# Patient Record
Sex: FEMALE | Race: WHITE | NOT HISPANIC OR LATINO | Employment: FULL TIME | URBAN - METROPOLITAN AREA
[De-identification: names, ages, dates, MRNs, and addresses within clinical notes are randomized per-mention and may not be internally consistent; named-entity substitution may affect disease eponyms.]

---

## 2017-05-24 ENCOUNTER — HOSPITAL ENCOUNTER (OUTPATIENT)
Dept: RADIOLOGY | Facility: MEDICAL CENTER | Age: 52
End: 2017-05-24
Attending: SPECIALIST
Payer: COMMERCIAL

## 2017-05-24 DIAGNOSIS — R74.8 OTHER NONSPECIFIC ABNORMAL SERUM ENZYME LEVELS: ICD-10-CM

## 2017-05-24 PROCEDURE — 76700 US EXAM ABDOM COMPLETE: CPT

## 2021-06-11 ENCOUNTER — TELEPHONE (OUTPATIENT)
Dept: SCHEDULING | Facility: IMAGING CENTER | Age: 56
End: 2021-06-11

## 2021-06-14 ENCOUNTER — OFFICE VISIT (OUTPATIENT)
Dept: MEDICAL GROUP | Facility: PHYSICIAN GROUP | Age: 56
End: 2021-06-14
Payer: COMMERCIAL

## 2021-06-14 VITALS
WEIGHT: 246.6 LBS | OXYGEN SATURATION: 99 % | HEIGHT: 65 IN | TEMPERATURE: 99.1 F | HEART RATE: 91 BPM | SYSTOLIC BLOOD PRESSURE: 128 MMHG | DIASTOLIC BLOOD PRESSURE: 80 MMHG | RESPIRATION RATE: 18 BRPM | BODY MASS INDEX: 41.09 KG/M2

## 2021-06-14 DIAGNOSIS — Z13.228 SCREENING FOR METABOLIC DISORDER: ICD-10-CM

## 2021-06-14 DIAGNOSIS — E78.49 OTHER HYPERLIPIDEMIA: ICD-10-CM

## 2021-06-14 DIAGNOSIS — C88.4 MALT LYMPHOMA (HCC): ICD-10-CM

## 2021-06-14 DIAGNOSIS — Z72.9 PROBLEM RELATED TO LIFESTYLE, UNSPECIFIED: ICD-10-CM

## 2021-06-14 DIAGNOSIS — E11.9 TYPE 2 DIABETES MELLITUS WITHOUT COMPLICATION, WITH LONG-TERM CURRENT USE OF INSULIN (HCC): ICD-10-CM

## 2021-06-14 DIAGNOSIS — E03.9 ACQUIRED HYPOTHYROIDISM: ICD-10-CM

## 2021-06-14 DIAGNOSIS — R91.8 PULMONARY NODULES: ICD-10-CM

## 2021-06-14 DIAGNOSIS — C55 ENDOMETRIOID ADENOCARCINOMA OF UTERUS (HCC): ICD-10-CM

## 2021-06-14 DIAGNOSIS — Z11.59 NEED FOR HEPATITIS C SCREENING TEST: ICD-10-CM

## 2021-06-14 DIAGNOSIS — K52.9 IBD (INFLAMMATORY BOWEL DISEASE): ICD-10-CM

## 2021-06-14 DIAGNOSIS — Z79.4 TYPE 2 DIABETES MELLITUS WITHOUT COMPLICATION, WITH LONG-TERM CURRENT USE OF INSULIN (HCC): ICD-10-CM

## 2021-06-14 DIAGNOSIS — I10 ESSENTIAL HYPERTENSION: ICD-10-CM

## 2021-06-14 DIAGNOSIS — K21.9 GASTROESOPHAGEAL REFLUX DISEASE WITHOUT ESOPHAGITIS: ICD-10-CM

## 2021-06-14 PROBLEM — C88.40 MALT LYMPHOMA: Status: ACTIVE | Noted: 2021-06-14

## 2021-06-14 PROCEDURE — 99204 OFFICE O/P NEW MOD 45 MIN: CPT | Performed by: STUDENT IN AN ORGANIZED HEALTH CARE EDUCATION/TRAINING PROGRAM

## 2021-06-14 RX ORDER — INSULIN LISPRO 100 [IU]/ML
INJECTION, SOLUTION INTRAVENOUS; SUBCUTANEOUS
COMMUNITY
End: 2021-06-14 | Stop reason: SDUPTHER

## 2021-06-14 RX ORDER — OMEPRAZOLE 20 MG/1
20 CAPSULE, DELAYED RELEASE ORAL DAILY
COMMUNITY
End: 2021-06-14

## 2021-06-14 RX ORDER — DULAGLUTIDE 1.5 MG/.5ML
INJECTION, SOLUTION SUBCUTANEOUS
COMMUNITY
End: 2021-06-14 | Stop reason: SDUPTHER

## 2021-06-14 RX ORDER — LEVOTHYROXINE SODIUM 0.1 MG/1
100 TABLET ORAL
COMMUNITY
End: 2022-02-11 | Stop reason: SDUPTHER

## 2021-06-14 RX ORDER — INSULIN LISPRO 100 [IU]/ML
10 INJECTION, SOLUTION INTRAVENOUS; SUBCUTANEOUS
Qty: 27 ML | Refills: 1 | Status: SHIPPED | OUTPATIENT
Start: 2021-06-14 | End: 2021-09-08

## 2021-06-14 RX ORDER — RAMIPRIL 2.5 MG/1
2.5 CAPSULE ORAL DAILY
COMMUNITY
End: 2021-06-14 | Stop reason: SDUPTHER

## 2021-06-14 RX ORDER — DULAGLUTIDE 1.5 MG/.5ML
0.5 INJECTION, SOLUTION SUBCUTANEOUS
Qty: 45 ML | Refills: 1 | Status: SHIPPED | OUTPATIENT
Start: 2021-06-14 | End: 2022-08-11 | Stop reason: SDUPTHER

## 2021-06-14 RX ORDER — SIMVASTATIN 10 MG
10 TABLET ORAL NIGHTLY
COMMUNITY
End: 2021-06-14 | Stop reason: SDUPTHER

## 2021-06-14 RX ORDER — SIMVASTATIN 10 MG
10 TABLET ORAL EVERY EVENING
Qty: 90 TABLET | Refills: 1 | Status: SHIPPED | OUTPATIENT
Start: 2021-06-14 | End: 2021-11-03

## 2021-06-14 RX ORDER — INSULIN DEGLUDEC 100 U/ML
INJECTION, SOLUTION SUBCUTANEOUS
COMMUNITY
End: 2021-06-14 | Stop reason: SDUPTHER

## 2021-06-14 RX ORDER — PANTOPRAZOLE SODIUM 20 MG/1
20 TABLET, DELAYED RELEASE ORAL DAILY
Qty: 90 TABLET | Refills: 1 | Status: SHIPPED | OUTPATIENT
Start: 2021-06-14 | End: 2021-11-19

## 2021-06-14 RX ORDER — RAMIPRIL 2.5 MG/1
2.5 CAPSULE ORAL DAILY
Qty: 90 CAPSULE | Refills: 1 | Status: SHIPPED | OUTPATIENT
Start: 2021-06-14 | End: 2021-11-19

## 2021-06-14 RX ORDER — INSULIN DEGLUDEC 100 U/ML
20 INJECTION, SOLUTION SUBCUTANEOUS EVERY MORNING
Qty: 1800 ML | Refills: 1 | Status: SHIPPED | OUTPATIENT
Start: 2021-06-14 | End: 2022-07-25 | Stop reason: SDUPTHER

## 2021-06-14 ASSESSMENT — PATIENT HEALTH QUESTIONNAIRE - PHQ9: CLINICAL INTERPRETATION OF PHQ2 SCORE: 0

## 2021-06-14 NOTE — ASSESSMENT & PLAN NOTE
Chronic and ongoing.  In the setting of type 2 diabetes.  Presently on simvastatin 10.  No muscle cramps, or confusions.    - will get new labs.

## 2021-06-14 NOTE — ASSESSMENT & PLAN NOTE
Chronic and ongoing.   Uncertain of recent A1c values.  (has been out of a couple meds - insulins - recently).   Only had Trulicity, (only 0.5 ml of 1.5 dose)....   out of Tresiba (out for 2 weeks) and humalog (out for 6 weeks)  Has also had metformin 1000 bid  Denies:  polyphagia, polydipsia, polyuria.    - will refill prior DM-medications. (as above).   - will get new labs.   - will need further follow-up, as most labs are likely to      be out of range, given her being out of meds.

## 2021-06-14 NOTE — LETTER
[facility]  Luis Eduardo Hamm M.D.  [address] [place] [address]  Fax: [phone]   Authorization for Release/Disclosure of   Protected Health Information   Name: RAJ KATE : 1965 SSN: [id number]   Address: [facility] [place] [address] Phone:    [phone] (home)    I authorize the entity listed below to release/disclose the PHI below to:   [facility]/Luis Eduardo Hamm M.D. and Luis Eduardo Hamm M.D.   Provider or Entity Name:   Dr. Phil Lackey  ([facility])          (oncology)    Address   [facility], [place] [address] Phone:   [phone]    Fax:   [phone]   Reason for request: continuity of care   Information to be released:    [  ] LAST COLONOSCOPY,  including any PATH REPORT and follow-up  [  ] LAST FIT/COLOGUARD RESULT [  ] LAST DEXA  [  ] LAST MAMMOGRAM  [  ] LAST PAP  [X] LAST LABS [  ] RETINA EXAM REPORT  [X] IMMUNIZATION RECORDS  [X] Release LAST 6 NOTES      [  ] Check here and initial the line next to each item to release ALL health information INCLUDING  _____ Care and treatment for drug and / or alcohol abuse  _____ HIV testing, infection status, or AIDS  _____ Genetic Testing    DATES OF SERVICE OR TIME PERIOD TO BE DISCLOSED: _____________  I understand and acknowledge that:  * This Authorization may be revoked at any time by you in writing, except if your health information has already been used or disclosed.  * Your health information that will be used or disclosed as a result of you signing this authorization could be re-disclosed by the recipient. If this occurs, your re-disclosed health information may no longer be protected by State or Federal laws.  * You may refuse to sign this Authorization. Your refusal will not affect your ability to obtain treatment.  * This Authorization becomes effective upon signing and will  on (date) __________.      If no date is  indicated, this Authorization will  one (1) year from the signature date.    Name: Stefania Serrano    Signature:   Date:     2021       PLEASE FAX REQUESTED RECORDS BACK TO: [phone]

## 2021-06-14 NOTE — ASSESSMENT & PLAN NOTE
Patient notes previously being told about pulmonary nodules.  This is part of what led up to her lymphoma diagnosis and other cancer findings.  She states that have been stable so far.   Denies dyspnea or fevers.   - We will request outside records.  - Being referred to oncology, for other cancers as well.

## 2021-06-14 NOTE — ASSESSMENT & PLAN NOTE
Hx of Chron's Disease, for about 20 years.   Notes, the imaging done for this, was why they   found multiple other issues.(Cancers).  Currently somewhat well controlled.  Will need referral to GI in the future.  - will get prior records.

## 2021-06-14 NOTE — ASSESSMENT & PLAN NOTE
Chronic and ongoing.   No surgeries or nodules, just described as low functioning.  Has been on levothyroxine 100.  Continues use this dose.  Notes her prior PCP was consider changing the dose.  - Get basic labs, including TSH.

## 2021-06-14 NOTE — PROGRESS NOTES
New to Provider  (and [facility])      Stefania Serrano is a 56 y.o. female.    Reason to establish: New patient to establish      Chief Complaint   Patient presents with   • Establish Care     -  New to [place].  (has been moving a lot for work).             Problems discussed this visit:    This note uses problem-based documentation.  Subjective HPI is included in Assessment & Plan, at bottom of note.   Problem   Ibd (Inflammatory Bowel Disease)   Malt Lymphoma (Hcc)   Endometrioid Adenocarcinoma of Uterus (Hcc)   Type 2 Diabetes Mellitus Without Complication, With Long-Term Current Use of Insulin (Hcc)   Acquired Hypothyroidism   Gastroesophageal Reflux Disease Without Esophagitis   Essential Hypertension   Other Hyperlipidemia   Pulmonary Nodules                          Past Medical History:   Diagnosis Date   • Acquired hypothyroidism 6/14/2021   • Essential hypertension 6/14/2021   • IBD (inflammatory bowel disease) 6/14/2021    Chron's   • MALT lymphoma (HCC) 6/14/2021   • Pulmonary nodules 6/14/2021   • Type 2 diabetes mellitus without complication, with long-term current use of insulin (HCC) 6/14/2021       Past Surgical History:   Procedure Laterality Date   • ABDOMINAL HYSTERECTOMY TOTAL      NADER + SBO       Family History   Problem Relation Age of Onset   • No Known Problems Other        Social History     Tobacco Use   • Smoking status: Never Smoker   • Smokeless tobacco: Never Used   Substance Use Topics   • Alcohol use: Yes     Comment: 2 times a year       Current medications:  (including new changes):  Current Outpatient Medications   Medication Sig Dispense Refill   • metformin (GLUCOPHAGE) 1000 MG tablet Take 1,000 mg by mouth 2 times a day with meals.     • levothyroxine (SYNTHROID) 100 MCG Tab Take 100 mcg by mouth every morning on an empty stomach.     • Dulaglutide (TRULICITY) 1.5 MG/0.5ML Solution Pen-injector Inject 0.5 mL under the skin every 7 days. 45 mL 1   • Insulin Degludec  "(TRESIBA FLEXTOUCH) 100 UNIT/ML Solution Pen-injector Inject 20 Units under the skin every morning. 1800 mL 1   • insulin lispro (HUMALOG KWIKPEN) 100 UNIT/ML Solution Pen-injector injection PEN Inject 10 Units under the skin 3 times a day before meals. 27 mL 1   • pantoprazole (PROTONIX) 20 MG tablet Take 1 tablet by mouth every day. 90 tablet 1   • ramipril (ALTACE) 2.5 MG Cap Take 1 capsule by mouth every day. 90 capsule 1   • simvastatin (ZOCOR) 10 MG Tab Take 1 tablet by mouth every evening. 90 tablet 1     No current facility-administered medications for this visit.       Allergies as of 06/14/2021   • (No Known Allergies)                    Review of Symptoms  (as noted elsewhere, and .....)    GEN/CONST:   Denies fever, chills, fatigue,    CARDIO:   Denies chest pain, palpitations, or edema.    RESP:   Denies shortness of breath, wheezing, or coughing.  GI:    Denies nausea, vomiting, diarrhea,        + does note occasional loose stooles (w/hx of chron's).   NEURO:  Denies numbness or focal weakness.       PSYCH:  Denies anxiety, depression,        Physical Exam  /80 (BP Location: Left arm, Patient Position: Sitting, BP Cuff Size: Adult)   Pulse 91   Temp 37.3 °C (99.1 °F) (Temporal)   Resp 18   Ht 1.657 m (5' 5.25\")   Wt 112 kg (246 lb 9.6 oz)   SpO2 99%   BMI 40.72 kg/m²   General:  Alert and oriented, No apparent distress.  Neck:  Supple. Trachea midline.   No lymphadenopathy noted.    Lungs: Clear to auscultation bilaterally.  No wheezes or rales.     Cardiovascular: Regular rate and rhythm.  No murmurs, or gallops.  Abdomen:  Large, but Soft.  Non-distended.  Non-tender.     Extremities:  Large, but  No pedal edema.  Good general motion of extremities.    Psychological: Appears to have normal mood and affect.      Labs:  No recent labs to review.                            Assessment & Plan  (with subjective history and orders):  Of note, the list below is not in a specific order.  "     Problem List Items Addressed This Visit         Endometrioid adenocarcinoma of uterus (HCC)     Prior diagnosis at end of 2019.   Had NADER and SBO.    She presents with a few outside records, that note     Pathology / dx codes noted:   pT1 - tumor confined to corpus uteri,              including endocervical glandular infolvement,       & pN0(i+) - isolated tumor cells in regional              lymph node(s) no greater than 0.2 mm.      ... left hypogastric sentinel lymph node with          isolated tumor cells (around 30), in 1 of 1 lymph node.   She notes having had NADER and SBO.   Then had chemo and radiation-tx after surgery.  - will request outside records.   - refer to GYN-ONC. (locally).          Relevant Orders    CBC WITH DIFFERENTIAL        REFERRAL TO GYNECOLOGIC ONCOLOGY        MALT  lymphoma (HCC)     Chronic and ongoing.   Began in July 2019.   Dx end of 2019 - MALT Lymphoma.   Had been seeing Dr. Phil Lackey,      (at [facility], in [facility]).   Had noted prior low lung function test.   Had been started rituximab,     but completed after a couple months.   Currently not on anything for this.   Prior Oncologist had considered future maintenance tx,     but not started yet....  - will get old records.   - will refer to local oncologist.  (Heme/Onc).          Relevant Orders    CBC WITH DIFFERENTIAL    REFERRAL TO HEMATOLOGY ONCOLOGY    Pulmonary nodules     Patient notes previously being told about pulmonary nodules.  This is part of what led up to her lymphoma diagnosis and other cancer findings.  She states that have been stable so far.   Denies dyspnea or fevers.   - We will request outside records.  - Being referred to oncology, for other cancers as well.           Acquired hypothyroidism     Chronic and ongoing.   No surgeries or nodules, just described as low functioning.  Has been on levothyroxine 100.  Continues use this dose.  Notes her prior PCP was consider changing the  dose.  - Get basic labs, including TSH.          Relevant Medications    levothyroxine (SYNTHROID) 100 MCG Tab    Other Relevant Orders    TSH WITH REFLEX TO FT4      Type 2 diabetes mellitus without complication, with long-term current use of insulin (HCC)     Chronic and ongoing.   Uncertain of recent A1c values.  (has been out of a couple meds - insulins - recently).   Only had Trulicity, (only 0.5 ml of 1.5 dose)....   out of Tresiba (out for 2 weeks) and humalog (out for 6 weeks)  Has also had metformin 1000 bid  Denies:  polyphagia, polydipsia, polyuria.    - will refill prior DM-medications. (as above).   - will get new labs.   - will need further follow-up, as most labs are likely to      be out of range, given her being out of meds.           Relevant Medications    metformin (GLUCOPHAGE) 1000 MG tablet    Dulaglutide (TRULICITY) 1.5 MG/0.5ML Solution Pen-injector    Insulin Degludec (TRESIBA FLEXTOUCH) 100 UNIT/ML Solution Pen-injector    insulin lispro (HUMALOG KWIKPEN) 100 UNIT/ML Solution Pen-injector injection PEN    Other Relevant Orders    Comp Metabolic Panel    Lipid Profile    MICROALBUMIN CREAT RATIO URINE    HEMOGLOBIN A1C      Essential hypertension     Chronic and ongoing, in setting of DM-2.   Has been on ramipril, mostly for DM-2.   Today, BP - borderline, diastolic (128/80).   Denies:  angina, palpitations, or dyspnea.     - will refill  Ramipril 2.5          Relevant Medications    ramipril (ALTACE) 2.5 MG Cap    simvastatin (ZOCOR) 10 MG Tab    Other Relevant Orders    Comp Metabolic Panel    MAGNESIUM    Gastroesophageal reflux disease without esophagitis     Chronic and ongoing.  Notes occasional heartburn, but denies past problems with throat.  Continues on PPI: Prilosec,  But states Protonix has worked better for her in the past.  - We will prescribe Protonix,     but uncertain if insurance will cover it. (patient aware).          Relevant Medications    pantoprazole (PROTONIX) 20 MG  tablet      IBD (inflammatory bowel disease)     Hx of Chron's Disease, for about 20 years.   Notes, the imaging done for this, was why they   found multiple other issues.(Cancers).  Currently somewhat well controlled.  Will need referral to GI in the future.  - will get prior records.          Relevant Medications    pantoprazole (PROTONIX) 20 MG tablet    Other Relevant Orders    CBC WITH DIFFERENTIAL    Comp Metabolic Panel    VITAMIN D,25 HYDROXY      Other hyperlipidemia     Chronic and ongoing.  In the setting of type 2 diabetes.  Presently on simvastatin 10.  No muscle cramps, or confusions.    - will get new labs.          Relevant Medications    ramipril (ALTACE) 2.5 MG Cap    simvastatin (ZOCOR) 10 MG Tab    Other Relevant Orders    Lipid Profile     Other Visit Diagnoses     Screening for metabolic disorder        Relevant Orders    CBC WITH DIFFERENTIAL    Comp Metabolic Panel    Lipid Profile    HEMOGLOBIN A1C    TSH WITH REFLEX TO FT4    VITAMIN D,25 HYDROXY    Need for hepatitis C screening test        Relevant Orders    HEP C VIRUS ANTIBODY    Problem related to lifestyle, unspecified        Relevant Orders    HEP C VIRUS ANTIBODY     Of note, the above list is not in a specific order.                  Diagnosis Table, with orders:  1. MALT lymphoma (HCC)  CBC WITH DIFFERENTIAL    REFERRAL TO HEMATOLOGY ONCOLOGY   2. Endometrioid adenocarcinoma of uterus (HCC)  CBC WITH DIFFERENTIAL        REFERRAL TO GYNECOLOGIC ONCOLOGY   3. Pulmonary nodules     4. Type 2 diabetes mellitus without complication, with long-term current use of insulin (HCC)  Dulaglutide (TRULICITY) 1.5 MG/0.5ML Solution Pen-injector    Insulin Degludec (TRESIBA FLEXTOUCH) 100 UNIT/ML Solution Pen-injector    insulin lispro (HUMALOG KWIKPEN) 100 UNIT/ML Solution Pen-injector injection PEN    Comp Metabolic Panel    Lipid Profile    MICROALBUMIN CREAT RATIO URINE    HEMOGLOBIN A1C   5. Essential hypertension  ramipril (ALTACE)  2.5 MG Cap    Comp Metabolic Panel    MAGNESIUM   6. Other hyperlipidemia  simvastatin (ZOCOR) 10 MG Tab    Lipid Profile   7. Acquired hypothyroidism  TSH WITH REFLEX TO FT4   8. IBD (inflammatory bowel disease)  CBC WITH DIFFERENTIAL    Comp Metabolic Panel    VITAMIN D,25 HYDROXY   9. Gastroesophageal reflux disease without esophagitis  pantoprazole (PROTONIX) 20 MG tablet   10. Screening for metabolic disorder  CBC WITH DIFFERENTIAL    Comp Metabolic Panel    Lipid Profile    HEMOGLOBIN A1C    TSH WITH REFLEX TO FT4    VITAMIN D,25 HYDROXY   11. Need for hepatitis C screening test  HEP C VIRUS ANTIBODY   12. Problem related to lifestyle, unspecified  HEP C VIRUS ANTIBODY                 Follow-up:  1-2 weeks  (DM-2, and multiple issues)       (can also review screenings).               A computerized dictation system may have been used in this note.    Despite review, there may be some errors in spelling or grammar.    Luis Eduardo Hamm M.D.  6/14/2021

## 2021-06-14 NOTE — ASSESSMENT & PLAN NOTE
Chronic and ongoing, in setting of DM-2.   Has been on ramipril, mostly for DM-2.   Today, BP - borderline, diastolic (128/80).   Denies:  angina, palpitations, or dyspnea.     - will refill  Ramipril 2.5

## 2021-06-14 NOTE — ASSESSMENT & PLAN NOTE
Chronic and ongoing.   Began in July 2019.   Dx end of 2019 - MALT Lymphoma.   Had been seeing Dr. Phil Lackey,      (at [facility], in [facility]   Had noted prior low lung function test.   Had been started rituximab,     but completed after a couple months.   Currently not on anything for this.   Prior Oncologist had considered future maintenance tx,     but not started yet....  - will get old records.   - will refer to local oncologist.  (Heme/Onc).

## 2021-06-14 NOTE — ASSESSMENT & PLAN NOTE
Chronic and ongoing.  Notes occasional heartburn, but denies past problems with throat.  Continues on PPI: Prilosec,  But states Protonix has worked better for her in the past.  - We will prescribe Protonix,     but uncertain if insurance will cover it. (patient aware).

## 2021-06-14 NOTE — ASSESSMENT & PLAN NOTE
Prior diagnosis at end of 2019.   Had NADER and SBO.    She presents with a few outside records, that note     Pathology / dx codes noted:   pT1 - tumor confined to corpus uteri,              including endocervical glandular infolvement,       & pN0(i+) - isolated tumor cells in regional              lymph node(s) no greater than 0.2 mm.      ... left hypogastric sentinel lymph node with          isolated tumor cells (around 30), in 1 of 1 lymph node.   She notes having had NADER and SBO.   Then had chemo and radiation-tx after surgery.  - will request outside records.   - refer to GYN-ONC. (locally).

## 2021-06-14 NOTE — LETTER
[facility]  Luis Eduardo Hamm M.D.  [address] [place] [address]  Fax: [phone]   Authorization for Release/Disclosure of   Protected Health Information   Name: STEFANIA KATE : 1965 SSN: [id number]   Address: [facility] [place] [address] Phone:    [phone] (home)    I authorize the entity listed below to release/disclose the PHI below to:   [facility]/Luis Eduardo Hamm M.D. and Luis Eduardo Hamm M.D.   Provider or Entity Name:   Dr. Ninfa Adames,  ([facility]).    Address   [facility], [place], [place] Phone:   [phone]    Fax:     Reason for request: continuity of care   Information to be released:    [  ] LAST COLONOSCOPY,  including any PATH REPORT and follow-up  [  ] LAST FIT/COLOGUARD RESULT [  ] LAST DEXA  [  ] LAST MAMMOGRAM  [  ] LAST PAP  [  ] LAST LABS [  ] RETINA EXAM REPORT  [  ] IMMUNIZATION RECORDS  [X] Release all info      [  ] Check here and initial the line next to each item to release ALL health information INCLUDING  _____ Care and treatment for drug and / or alcohol abuse  _____ HIV testing, infection status, or AIDS  _____ Genetic Testing    DATES OF SERVICE OR TIME PERIOD TO BE DISCLOSED: _____________  I understand and acknowledge that:  * This Authorization may be revoked at any time by you in writing, except if your health information has already been used or disclosed.  * Your health information that will be used or disclosed as a result of you signing this authorization could be re-disclosed by the recipient. If this occurs, your re-disclosed health information may no longer be protected by State or Federal laws.  * You may refuse to sign this Authorization. Your refusal will not affect your ability to obtain treatment.  * This Authorization becomes effective upon signing and will  on (date) __________.      If no date is indicated, this Authorization will  one (1) year from the signature date.    Name: Stefania  Maggie    Signature:   Date:     6/14/2021       PLEASE FAX REQUESTED RECORDS BACK TO: [phone]

## 2021-06-14 NOTE — PATIENT INSTRUCTIONS
Please get the labs done in the next few days.  Please get them done while fasting (no food, coffee, or juices, for 8 hours - but water is ok).     Please follow-up with the referrals, when contacted.   Thank you.

## 2021-06-18 ENCOUNTER — HOSPITAL ENCOUNTER (OUTPATIENT)
Dept: LAB | Facility: MEDICAL CENTER | Age: 56
End: 2021-06-18
Attending: STUDENT IN AN ORGANIZED HEALTH CARE EDUCATION/TRAINING PROGRAM
Payer: COMMERCIAL

## 2021-06-18 DIAGNOSIS — E03.9 ACQUIRED HYPOTHYROIDISM: ICD-10-CM

## 2021-06-18 DIAGNOSIS — I10 ESSENTIAL HYPERTENSION: ICD-10-CM

## 2021-06-18 DIAGNOSIS — C88.4 MALT LYMPHOMA (HCC): ICD-10-CM

## 2021-06-18 DIAGNOSIS — C55 ENDOMETRIOID ADENOCARCINOMA OF UTERUS (HCC): ICD-10-CM

## 2021-06-18 DIAGNOSIS — Z11.59 NEED FOR HEPATITIS C SCREENING TEST: ICD-10-CM

## 2021-06-18 DIAGNOSIS — E11.9 TYPE 2 DIABETES MELLITUS WITHOUT COMPLICATION, WITH LONG-TERM CURRENT USE OF INSULIN (HCC): ICD-10-CM

## 2021-06-18 DIAGNOSIS — E78.49 OTHER HYPERLIPIDEMIA: ICD-10-CM

## 2021-06-18 DIAGNOSIS — Z72.9 PROBLEM RELATED TO LIFESTYLE, UNSPECIFIED: ICD-10-CM

## 2021-06-18 DIAGNOSIS — K52.9 IBD (INFLAMMATORY BOWEL DISEASE): ICD-10-CM

## 2021-06-18 DIAGNOSIS — Z13.228 SCREENING FOR METABOLIC DISORDER: ICD-10-CM

## 2021-06-18 DIAGNOSIS — Z79.4 TYPE 2 DIABETES MELLITUS WITHOUT COMPLICATION, WITH LONG-TERM CURRENT USE OF INSULIN (HCC): ICD-10-CM

## 2021-06-18 PROCEDURE — 85025 COMPLETE CBC W/AUTO DIFF WBC: CPT

## 2021-06-18 PROCEDURE — 86304 IMMUNOASSAY TUMOR CA 125: CPT

## 2021-06-18 PROCEDURE — 83036 HEMOGLOBIN GLYCOSYLATED A1C: CPT

## 2021-06-18 PROCEDURE — 82306 VITAMIN D 25 HYDROXY: CPT

## 2021-06-18 PROCEDURE — 80053 COMPREHEN METABOLIC PANEL: CPT

## 2021-06-18 PROCEDURE — 86803 HEPATITIS C AB TEST: CPT

## 2021-06-18 PROCEDURE — 84443 ASSAY THYROID STIM HORMONE: CPT

## 2021-06-18 PROCEDURE — 82570 ASSAY OF URINE CREATININE: CPT

## 2021-06-18 PROCEDURE — 80061 LIPID PANEL: CPT

## 2021-06-18 PROCEDURE — 83735 ASSAY OF MAGNESIUM: CPT

## 2021-06-18 PROCEDURE — 36415 COLL VENOUS BLD VENIPUNCTURE: CPT

## 2021-06-18 PROCEDURE — 82043 UR ALBUMIN QUANTITATIVE: CPT

## 2021-06-19 LAB
25(OH)D3 SERPL-MCNC: 40 NG/ML (ref 30–100)
ALBUMIN SERPL BCP-MCNC: 3.9 G/DL (ref 3.2–4.9)
ALBUMIN/GLOB SERPL: 0.9 G/DL
ALP SERPL-CCNC: 99 U/L (ref 30–99)
ALT SERPL-CCNC: 54 U/L (ref 2–50)
ANION GAP SERPL CALC-SCNC: 11 MMOL/L (ref 7–16)
AST SERPL-CCNC: 41 U/L (ref 12–45)
BASOPHILS # BLD AUTO: 1.1 % (ref 0–1.8)
BASOPHILS # BLD: 0.07 K/UL (ref 0–0.12)
BILIRUB SERPL-MCNC: 0.4 MG/DL (ref 0.1–1.5)
BUN SERPL-MCNC: 17 MG/DL (ref 8–22)
CALCIUM SERPL-MCNC: 9.1 MG/DL (ref 8.5–10.5)
CANCER AG125 SERPL-ACNC: 17.7 U/ML (ref 0–35)
CHLORIDE SERPL-SCNC: 101 MMOL/L (ref 96–112)
CHOLEST SERPL-MCNC: 187 MG/DL (ref 100–199)
CO2 SERPL-SCNC: 27 MMOL/L (ref 20–33)
CREAT SERPL-MCNC: 0.67 MG/DL (ref 0.5–1.4)
CREAT UR-MCNC: 281.27 MG/DL
EOSINOPHIL # BLD AUTO: 0.11 K/UL (ref 0–0.51)
EOSINOPHIL NFR BLD: 1.7 % (ref 0–6.9)
ERYTHROCYTE [DISTWIDTH] IN BLOOD BY AUTOMATED COUNT: 44.9 FL (ref 35.9–50)
EST. AVERAGE GLUCOSE BLD GHB EST-MCNC: 237 MG/DL
FASTING STATUS PATIENT QL REPORTED: NORMAL
GLOBULIN SER CALC-MCNC: 4.2 G/DL (ref 1.9–3.5)
GLUCOSE SERPL-MCNC: 225 MG/DL (ref 65–99)
HBA1C MFR BLD: 9.9 % (ref 4–5.6)
HCT VFR BLD AUTO: 42.7 % (ref 37–47)
HCV AB SER QL: NORMAL
HDLC SERPL-MCNC: 49 MG/DL
HGB BLD-MCNC: 13 G/DL (ref 12–16)
IMM GRANULOCYTES # BLD AUTO: 0.03 K/UL (ref 0–0.11)
IMM GRANULOCYTES NFR BLD AUTO: 0.5 % (ref 0–0.9)
LDLC SERPL CALC-MCNC: 102 MG/DL
LYMPHOCYTES # BLD AUTO: 2.1 K/UL (ref 1–4.8)
LYMPHOCYTES NFR BLD: 32 % (ref 22–41)
MAGNESIUM SERPL-MCNC: 2.1 MG/DL (ref 1.5–2.5)
MCH RBC QN AUTO: 24.9 PG (ref 27–33)
MCHC RBC AUTO-ENTMCNC: 30.4 G/DL (ref 33.6–35)
MCV RBC AUTO: 81.8 FL (ref 81.4–97.8)
MICROALBUMIN UR-MCNC: 2.6 MG/DL
MICROALBUMIN/CREAT UR: 9 MG/G (ref 0–30)
MONOCYTES # BLD AUTO: 0.71 K/UL (ref 0–0.85)
MONOCYTES NFR BLD AUTO: 10.8 % (ref 0–13.4)
NEUTROPHILS # BLD AUTO: 3.54 K/UL (ref 2–7.15)
NEUTROPHILS NFR BLD: 53.9 % (ref 44–72)
NRBC # BLD AUTO: 0 K/UL
NRBC BLD-RTO: 0 /100 WBC
PLATELET # BLD AUTO: 227 K/UL (ref 164–446)
PMV BLD AUTO: 11.3 FL (ref 9–12.9)
POTASSIUM SERPL-SCNC: 4.5 MMOL/L (ref 3.6–5.5)
PROT SERPL-MCNC: 8.1 G/DL (ref 6–8.2)
RBC # BLD AUTO: 5.22 M/UL (ref 4.2–5.4)
SODIUM SERPL-SCNC: 139 MMOL/L (ref 135–145)
TRIGL SERPL-MCNC: 181 MG/DL (ref 0–149)
TSH SERPL DL<=0.005 MIU/L-ACNC: 5.08 UIU/ML (ref 0.38–5.33)
WBC # BLD AUTO: 6.6 K/UL (ref 4.8–10.8)

## 2021-06-24 ENCOUNTER — HOSPITAL ENCOUNTER (OUTPATIENT)
Dept: RADIOLOGY | Facility: MEDICAL CENTER | Age: 56
End: 2021-06-24
Attending: STUDENT IN AN ORGANIZED HEALTH CARE EDUCATION/TRAINING PROGRAM
Payer: COMMERCIAL

## 2021-06-24 ENCOUNTER — OFFICE VISIT (OUTPATIENT)
Dept: MEDICAL GROUP | Facility: PHYSICIAN GROUP | Age: 56
End: 2021-06-24
Payer: COMMERCIAL

## 2021-06-24 VITALS
HEART RATE: 94 BPM | BODY MASS INDEX: 40.82 KG/M2 | OXYGEN SATURATION: 94 % | DIASTOLIC BLOOD PRESSURE: 68 MMHG | TEMPERATURE: 98.4 F | HEIGHT: 65 IN | WEIGHT: 245 LBS | SYSTOLIC BLOOD PRESSURE: 100 MMHG | RESPIRATION RATE: 18 BRPM

## 2021-06-24 DIAGNOSIS — Z79.4 TYPE 2 DIABETES MELLITUS WITHOUT COMPLICATION, WITH LONG-TERM CURRENT USE OF INSULIN (HCC): ICD-10-CM

## 2021-06-24 DIAGNOSIS — R74.01 ELEVATED ALT MEASUREMENT: ICD-10-CM

## 2021-06-24 DIAGNOSIS — Z12.31 SCREENING MAMMOGRAM, ENCOUNTER FOR: ICD-10-CM

## 2021-06-24 DIAGNOSIS — M54.50 CHRONIC BILATERAL LOW BACK PAIN WITHOUT SCIATICA: ICD-10-CM

## 2021-06-24 DIAGNOSIS — G89.29 CHRONIC BILATERAL LOW BACK PAIN WITHOUT SCIATICA: ICD-10-CM

## 2021-06-24 DIAGNOSIS — E03.9 ACQUIRED HYPOTHYROIDISM: ICD-10-CM

## 2021-06-24 DIAGNOSIS — Z79.899 MEDICATION MANAGEMENT: ICD-10-CM

## 2021-06-24 DIAGNOSIS — I10 ESSENTIAL HYPERTENSION: ICD-10-CM

## 2021-06-24 DIAGNOSIS — E78.49 OTHER HYPERLIPIDEMIA: ICD-10-CM

## 2021-06-24 DIAGNOSIS — E11.9 TYPE 2 DIABETES MELLITUS WITHOUT COMPLICATION, WITH LONG-TERM CURRENT USE OF INSULIN (HCC): ICD-10-CM

## 2021-06-24 PROCEDURE — 99214 OFFICE O/P EST MOD 30 MIN: CPT | Performed by: STUDENT IN AN ORGANIZED HEALTH CARE EDUCATION/TRAINING PROGRAM

## 2021-06-24 PROCEDURE — 72100 X-RAY EXAM L-S SPINE 2/3 VWS: CPT

## 2021-06-24 PROCEDURE — 72202 X-RAY EXAM SI JOINTS 3/> VWS: CPT

## 2021-06-24 RX ORDER — MELOXICAM 7.5 MG/1
7.5 TABLET ORAL DAILY
Qty: 30 TABLET | Refills: 1 | Status: SHIPPED | OUTPATIENT
Start: 2021-06-24 | End: 2022-02-08

## 2021-06-24 ASSESSMENT — FIBROSIS 4 INDEX: FIB4 SCORE: 1.4

## 2021-06-24 NOTE — ASSESSMENT & PLAN NOTE
Chronic and ongoing for about 6 months.  Has been more on left, but both sides of low back.      Occasional pain into left thigh, but not leg.     (not sure if radiating from back, or separate).   6/10, throbbing and burning pain.      Makes it more difficult to sleep at night.   Takes Aleve 200 mg, daily.   Heat has helped (in low back).  ... Exam with mild tenderness to rotation and Terrie-test.    Likely mild sprain/strain of lumbars, and possibly left SI.      Also likely component of extra stress from prior weight gain.  - will get low low back x-rays.   - will refer to PT for muscle strengthening / balancing.   - can also try chiropractic (if desired), but get x-rays first.   - will prescribe Mobic 7.5.  (for 1-2 month).

## 2021-06-24 NOTE — ASSESSMENT & PLAN NOTE
ALT-54 (ast-41).    No prior labs.    Has previously been treated for cancer     (H/o endometrial adenocarcinoma).  Denies abdominal pains.   Can monitor periodically.      (avoid increasing statin for now).   - get new CMP with next future labs.

## 2021-06-24 NOTE — PROGRESS NOTES
Established Patient     Stefania Serrano is a 56 y.o. female.    Chief Complaint   Patient presents with   • Lab Results   • Back Pain             Problems addressed this visit:     This note uses problem-based documentation.  Subjective HPI is included in Assessment & Plan, at bottom of note.   Problem   Chronic Bilateral Low Back Pain Without Sciatica   Elevated Alt Measurement   Type 2 Diabetes Mellitus Without Complication, With Long-Term Current Use of Insulin (Hcc)   Acquired Hypothyroidism   Essential Hypertension   Other Hyperlipidemia                            Past Medical History:   Diagnosis Date   • Acquired hypothyroidism 6/14/2021   • Essential hypertension 6/14/2021   • IBD (inflammatory bowel disease) 6/14/2021    Chron's   • MALT lymphoma (HCC) 6/14/2021   • Pulmonary nodules 6/14/2021   • Type 2 diabetes mellitus without complication, with long-term current use of insulin (HCC) 6/14/2021       Patient Active Problem List   Diagnosis   • IBD (inflammatory bowel disease)   • MALT lymphoma (HCC)   • Endometrioid adenocarcinoma of uterus (HCC)   • Type 2 diabetes mellitus without complication, with long-term current use of insulin (HCC)   • Acquired hypothyroidism   • Gastroesophageal reflux disease without esophagitis   • Essential hypertension   • Other hyperlipidemia   • Pulmonary nodules   • Chronic bilateral low back pain without sciatica   • Elevated ALT measurement       Past Surgical History:   Procedure Laterality Date   • ABDOMINAL HYSTERECTOMY TOTAL      NADER + SBO       Family History   Problem Relation Age of Onset   • No Known Problems Other        Social History     Tobacco Use   • Smoking status: Never Smoker   • Smokeless tobacco: Never Used   Substance Use Topics   • Alcohol use: Yes     Comment: 2 times a year       Current medications:  (including new changes):  Current Outpatient Medications   Medication Sig Dispense Refill   • meloxicam (MOBIC) 7.5 MG Tab Take 1 tablet by mouth every  "day. 30 tablet 1   • metformin (GLUCOPHAGE) 1000 MG tablet Take 1,000 mg by mouth 2 times a day with meals.     • levothyroxine (SYNTHROID) 100 MCG Tab Take 100 mcg by mouth every morning on an empty stomach.     • Dulaglutide (TRULICITY) 1.5 MG/0.5ML Solution Pen-injector Inject 0.5 mL under the skin every 7 days. 45 mL 1   • Insulin Degludec (TRESIBA FLEXTOUCH) 100 UNIT/ML Solution Pen-injector Inject 20 Units under the skin every morning. 1800 mL 1   • insulin lispro (HUMALOG KWIKPEN) 100 UNIT/ML Solution Pen-injector injection PEN Inject 10 Units under the skin 3 times a day before meals. 27 mL 1   • pantoprazole (PROTONIX) 20 MG tablet Take 1 tablet by mouth every day. 90 tablet 1   • ramipril (ALTACE) 2.5 MG Cap Take 1 capsule by mouth every day. 90 capsule 1   • simvastatin (ZOCOR) 10 MG Tab Take 1 tablet by mouth every evening. 90 tablet 1     No current facility-administered medications for this visit.       Allergies as of 06/24/2021   • (No Known Allergies)                   Review of Symptoms   (as noted elsewhere, and .....)   GEN/CONST:   Denies fever, chills, fatigue,   CARDIO:   Denies chest pain, palpitations, or edema.    RESP:   Denies shortness of breath, wheezing, or coughing.  GI:    Denies nausea, vomiting, diarrhea,      MSK:   + LBP - see HPI.   NEURO:  Denies numbness or focal weakness.      PSYCH:  Denies anxiety, depression,        Physical Exam  /68 (BP Location: Left arm, Patient Position: Sitting, BP Cuff Size: Adult)   Pulse 94   Temp 36.9 °C (98.4 °F) (Temporal)   Resp 18   Ht 1.657 m (5' 5.25\")   Wt 111 kg (245 lb)   SpO2 94%   BMI 40.46 kg/m²   General:  Alert and oriented, No apparent distress.    Lungs: Clear to auscultation bilaterally.  No wheezes or rales.  Cardiovascular: Regular rate and rhythm.  No murmurs, rubs or gallops.  Abdomen:  Soft.  Non-tender.  No rebound or guarding.   Extremities:  No pedal edema.  Good general motion of extremities.   MSK:  + " Localized Pain to lower left paraspinals     with Kemps test - Rotation (Extension > flexion).    + Local pain to left SI, on bilateral Luis Eduardo-Edis testing.    -  No tenderness to spinal or SI palpation / pressure.       Monofilament testing with a 10 gram force: sensation intact: intact bilaterally  Visual Inspection: Feet without maceration, ulcers, fissures.  Pedal pulses: intact bilaterally        Labs:  Recent / Prior labs reviewed. - with patient.   CBC, CMP, Lipids, TSH, A1c, micro-Albumin, Vit.D   and (normal) hep.c.screen.                             Assessment & Plan  (with subjective history and orders):  Of note, the list below is not in a specific nor sortable order.      Problem List Items Addressed This Visit     Acquired hypothyroidism     Chronic and ongoing.   No prior surgeries or nodules, just described as low functioning.  Has been on levothyroxine 100.  Recent TSH at upper end of normal (TSH-5.08).  Given that she had been out of multiple other meds previously,     uncertain if this may have impacted recent function.   - will repeat labs in 3 months, after on medications regularly.          Relevant Orders    TSH    FREE THYROXINE    TRIIDOTHYRONINE    Chronic bilateral low back pain without sciatica     Chronic and ongoing for about 6 months.  Has been more on left, but both sides of low back.      Occasional pain into left thigh, but not leg.     (not sure if radiating from back, or separate).   6/10, throbbing and burning pain.      Makes it more difficult to sleep at night.   Takes Aleve 200 mg, daily.   Heat has helped (in low back).  ... Exam with mild tenderness to rotation and Terrie-test.    Likely mild sprain/strain of lumbars, and possibly left SI.      Also likely component of extra stress from prior weight gain.  - will get low low back x-rays.   - will refer to PT for muscle strengthening / balancing.   - can also try chiropractic (if desired), but get x-rays first.   - will  prescribe Mobic 7.5.  (for 1-2 month).          Relevant Medications    meloxicam (MOBIC) 7.5 MG Tab    Other Relevant Orders    REFERRAL TO PHYSICAL THERAPY    DX-LUMBAR SPINE-2 OR 3 VIEWS    DX-SACROILIAC JOINTS 3+    Elevated ALT measurement     ALT-54 (ast-41).    No prior labs.    Has previously been treated for cancer     (H/o endometrial adenocarcinoma).  Denies abdominal pains.   Can monitor periodically.      (avoid increasing statin for now).   - get new CMP with next future labs.          Relevant Orders    Comp Metabolic Panel    Essential hypertension     Chronic and ongoing, in setting of DM-2.   Has been on ramipril, mostly for DM-2.   Previously, BP - borderline, diastolic (128/80).   Then restarted on Ramipril 2.5    Denies:  angina, palpitations, or dyspnea.     BP - 100/68.   - continue for now, given DM-2.   - monitor BP at home.   - if further decrease may need to stop medication.           Other hyperlipidemia     Chronic and ongoing.  In the setting of type 2 diabetes.  Presently on simvastatin 10.  No muscle cramps, or confusions.    Recent labs with LDL-102.  TG-181.  - considered increasing med, but will hold-off,     since ALT is elevated.   - will get new LFT's in future.          Relevant Orders    Comp Metabolic Panel    Type 2 diabetes mellitus without complication,   with long-term current use of insulin (HCC)     Chronic and ongoing.     DM Meds:    - Tresiba (degludec) 20 units AM.  - Lispro (humalog kwikpen) 10 QAC.  - Trulicity (dulaglutide 0.5 ml, inj weekly)  - metformin 1000 mg BID  ...  Patient had previously been OUT of multiple meds       (refilled previously, but only recently received / restarted them).      Denies symptoms or concerns.    Semi-Annual Screens:      Last A1c was  9.9 (6/18/2021)  - while OFF several meds.....  Annual Screens:       Kidney function:  GFR:   Normal   (6/18/2021)     Microalbumin Ratio:   Normal (ratio - 2.6)   (6/18/2021)     Monofilament  test:   Normal   (6/24/2021)      Checks feet at home:  Yes.      Retina Scan:  Will get today... 6/24/2021    ....  However, uncertain if it resulted well....   ... will need further follow-up.....     Associated issues:      BP goal <140/<80:  Yes     On ACE/ARB:  Yes       LDL goal < 100:  No (102)...  (6/18/2021)      On Statin:  Yes    ... but hold-off on increasing, given elevated ALT.      PPSV23 given:  Uncertain - she will try to verify records.....     Management / Plan:     - continue with medications as above    (recently refilled, and need regular use).   - will get new A1c and labs, in 3 months   (after being on medications regularly).   - may need new retinal scan or referral...   (recheck on next visit).          Relevant Orders    Diabetic Monofilament LE Exam (Completed)    POCT Retinal Eye Exam    HEMOGLOBIN A1C      Other Visit Diagnoses     Screening mammogram, encounter for        Relevant Orders    MA-SCREENING MAMMO BILAT W/TOMOSYNTHESIS W/CAD    Medication management        Relevant Orders    TSH    FREE THYROXINE    TRIIDOTHYRONINE    HEMOGLOBIN A1C    Comp Metabolic Panel        Of note, the above list is not in a specific nor sortable order.                  Diagnosis Table, with orders:  1. Type 2 diabetes mellitus without complication, with long-term current use of insulin (HCC)  Diabetic Monofilament LE Exam    POCT Retinal Eye Exam    HEMOGLOBIN A1C   2. Chronic bilateral low back pain without sciatica  REFERRAL TO PHYSICAL THERAPY    DX-LUMBAR SPINE-2 OR 3 VIEWS    DX-SACROILIAC JOINTS 3+    meloxicam (MOBIC) 7.5 MG Tab   3. Acquired hypothyroidism  TSH    FREE THYROXINE    TRIIDOTHYRONINE   4. Essential hypertension     5. Other hyperlipidemia  Comp Metabolic Panel   6. Medication management  TSH    FREE THYROXINE    TRIIDOTHYRONINE    HEMOGLOBIN A1C    Comp Metabolic Panel   7. Screening mammogram, encounter for  MA-SCREENING MAMMO BILAT W/TOMOSYNTHESIS W/CAD   8. Elevated ALT  measurement  Comp Metabolic Panel                   Follow-up:  3 months  (DM-2, Thyroid, Labs, and LBP)  (and screenings)              A computerized dictation system may have been used in this note.    Despite review, there may be some errors in spelling or grammar.    Luis Eduardo Hamm M.D.  6/24/2021

## 2021-06-24 NOTE — ASSESSMENT & PLAN NOTE
Chronic and ongoing.     DM Meds:    - Tresiba (degludec) 20 units AM.  - Lispro (humalog kwikpen) 10 QAC.  - Trulicity (dulaglutide 0.5 ml, inj weekly)  - metformin 1000 mg BID  ...  Patient had previously been OUT of multiple meds       (refilled previously, but only recently received / restarted them).      Denies symptoms or concerns.    Semi-Annual Screens:      Last A1c was  9.9 (6/18/2021)  - while OFF several meds.....  Annual Screens:       Kidney function:  GFR:   Normal   (6/18/2021)     Microalbumin Ratio:   Normal (ratio - 2.6)   (6/18/2021)     Monofilament test:   Normal   (6/24/2021)      Checks feet at home:  Yes.      Retina Scan:  Will get today... 6/24/2021    ....  However, uncertain if it resulted well....   ... will need further follow-up.....     Associated issues:      BP goal <140/<80:  Yes     On ACE/ARB:  Yes       LDL goal < 100:  No (102)...  (6/18/2021)      On Statin:  Yes    ... but hold-off on increasing, given elevated ALT.      PPSV23 given:  Uncertain - she will try to verify records.....     Management / Plan:     - continue with medications as above    (recently refilled, and need regular use).   - will get new A1c and labs, in 3 months   (after being on medications regularly).   - may need new retinal scan or referral...   (recheck on next visit).

## 2021-06-24 NOTE — ASSESSMENT & PLAN NOTE
Chronic and ongoing, in setting of DM-2.   Has been on ramipril, mostly for DM-2.   Previously, BP - borderline, diastolic (128/80).   Then restarted on Ramipril 2.5    Denies:  angina, palpitations, or dyspnea.     BP - 100/68.   - continue for now, given DM-2.   - monitor BP at home.   - if further decrease may need to stop medication.

## 2021-06-24 NOTE — PATIENT INSTRUCTIONS
Please get the labs in about 3 months.  Please get the labs done at least a week prior to your next visit.    Please get them done while fasting   (no food, coffee, or juices, for 8 hours - but water is ok).     Please try the Mobic, for pain.   Follow-up with physical therapy.     Return in 3+ months.   Thank you.

## 2021-06-24 NOTE — ASSESSMENT & PLAN NOTE
Chronic and ongoing.  In the setting of type 2 diabetes.  Presently on simvastatin 10.  No muscle cramps, or confusions.    Recent labs with LDL-102.  TG-181.  - considered increasing med, but will hold-off,     since ALT is elevated.   - will get new LFT's in future.

## 2021-06-24 NOTE — ASSESSMENT & PLAN NOTE
Chronic and ongoing.   No prior surgeries or nodules, just described as low functioning.  Has been on levothyroxine 100.  Recent TSH at upper end of normal (TSH-5.08).  Given that she had been out of multiple other meds previously,     uncertain if this may have impacted recent function.   - will repeat labs in 3 months, after on medications regularly.

## 2021-07-08 ENCOUNTER — HOSPITAL ENCOUNTER (OUTPATIENT)
Facility: MEDICAL CENTER | Age: 56
End: 2021-07-08
Attending: INTERNAL MEDICINE
Payer: COMMERCIAL

## 2021-07-08 PROCEDURE — 87389 HIV-1 AG W/HIV-1&-2 AB AG IA: CPT

## 2021-07-08 PROCEDURE — 80074 ACUTE HEPATITIS PANEL: CPT

## 2021-07-09 ENCOUNTER — HOSPITAL ENCOUNTER (OUTPATIENT)
Dept: RADIOLOGY | Facility: MEDICAL CENTER | Age: 56
End: 2021-07-09
Attending: STUDENT IN AN ORGANIZED HEALTH CARE EDUCATION/TRAINING PROGRAM
Payer: COMMERCIAL

## 2021-07-09 DIAGNOSIS — Z12.31 SCREENING MAMMOGRAM, ENCOUNTER FOR: ICD-10-CM

## 2021-07-09 LAB
HAV IGM SERPL QL IA: NORMAL
HBV CORE IGM SER QL: NORMAL
HBV SURFACE AG SER QL: NORMAL
HCV AB SER QL: NORMAL
HIV 1+2 AB+HIV1 P24 AG SERPL QL IA: NORMAL

## 2021-07-09 PROCEDURE — 77063 BREAST TOMOSYNTHESIS BI: CPT

## 2021-08-24 ENCOUNTER — HOSPITAL ENCOUNTER (OUTPATIENT)
Dept: RADIOLOGY | Facility: MEDICAL CENTER | Age: 56
End: 2021-08-24
Attending: INTERNAL MEDICINE
Payer: COMMERCIAL

## 2021-08-24 DIAGNOSIS — C88.4 MALT (MUCOSA ASSOCIATED LYMPHOID TISSUE) (HCC): ICD-10-CM

## 2021-08-24 PROCEDURE — 71260 CT THORAX DX C+: CPT

## 2021-08-24 PROCEDURE — 700117 HCHG RX CONTRAST REV CODE 255: Performed by: INTERNAL MEDICINE

## 2021-08-24 RX ADMIN — IOHEXOL 100 ML: 350 INJECTION, SOLUTION INTRAVENOUS at 16:46

## 2021-08-24 RX ADMIN — IOHEXOL 25 ML: 240 INJECTION, SOLUTION INTRATHECAL; INTRAVASCULAR; INTRAVENOUS; ORAL at 16:46

## 2021-09-03 ENCOUNTER — OFFICE VISIT (OUTPATIENT)
Dept: MEDICAL GROUP | Facility: PHYSICIAN GROUP | Age: 56
End: 2021-09-03
Payer: COMMERCIAL

## 2021-09-03 VITALS
BODY MASS INDEX: 40.02 KG/M2 | TEMPERATURE: 97.9 F | DIASTOLIC BLOOD PRESSURE: 76 MMHG | SYSTOLIC BLOOD PRESSURE: 132 MMHG | HEIGHT: 66 IN | OXYGEN SATURATION: 92 % | HEART RATE: 96 BPM | WEIGHT: 249 LBS

## 2021-09-03 DIAGNOSIS — R00.2 PALPITATIONS: ICD-10-CM

## 2021-09-03 DIAGNOSIS — R53.83 OTHER FATIGUE: ICD-10-CM

## 2021-09-03 DIAGNOSIS — R59.0 HILAR LYMPHADENOPATHY: ICD-10-CM

## 2021-09-03 DIAGNOSIS — R06.09 DYSPNEA ON EXERTION: ICD-10-CM

## 2021-09-03 DIAGNOSIS — C78.00 MALIGNANT NEOPLASM METASTATIC TO LUNG, UNSPECIFIED LATERALITY (HCC): ICD-10-CM

## 2021-09-03 DIAGNOSIS — R59.0 MEDIASTINAL LYMPHADENOPATHY: ICD-10-CM

## 2021-09-03 DIAGNOSIS — R06.01 ORTHOPNEA: ICD-10-CM

## 2021-09-03 DIAGNOSIS — E03.9 ACQUIRED HYPOTHYROIDISM: ICD-10-CM

## 2021-09-03 PROCEDURE — 99214 OFFICE O/P EST MOD 30 MIN: CPT | Performed by: STUDENT IN AN ORGANIZED HEALTH CARE EDUCATION/TRAINING PROGRAM

## 2021-09-03 PROCEDURE — 93000 ELECTROCARDIOGRAM COMPLETE: CPT | Performed by: STUDENT IN AN ORGANIZED HEALTH CARE EDUCATION/TRAINING PROGRAM

## 2021-09-03 ASSESSMENT — FIBROSIS 4 INDEX: FIB4 SCORE: 1.4

## 2021-09-04 NOTE — ASSESSMENT & PLAN NOTE
Patient notes progressive worsening of dyspnea on exertion.  She notes that she can generally walk to her mailbox and back, but has difficulty breathing with a degree of exertion beyond that.  Unclear for possible orthopnea.  Denies pedal edema.  Also in the setting of possible recurrence of cancer, she has previously had MALT, and endometrial lymphoma, previously treated.  Recent CT scans with pulmonary nodules, as well as apparently new lymphadenopathy and mediastinal and hilar regions.  -Get echo for dyspnea.

## 2021-09-04 NOTE — PATIENT INSTRUCTIONS
Please follow-up with the referral(s) to Pulmonology and Cardiology.  You will likely receive a phone call or Adjud[employer] message, with information about what office to contact, in order to schedule.  However, if you do not hear from them in the next week or two, please call [phone], and ask for the referral line, to find out the status of the referral.

## 2021-09-04 NOTE — ASSESSMENT & PLAN NOTE
Has had fatigue progressively getting worse   over past 2 months.   Has some loose stools, but also with Chron's dz.   (notes this is typical for her).   Notes heavy chest if laying flat.   Dyspnea after mailbox and back.   Recent CT with mediastinal and hilar LAD and pulm nodules.  Prior cancer, and in setting of possible recurrence.   EKG with low voltage, but large body habitus.   - get new thyroid labs.  (with hypothyroidism)   - get new echo.   (given dyspnea and orthopnea).

## 2021-09-04 NOTE — ASSESSMENT & PLAN NOTE
Chronic and ongoing.   No prior surgeries, just described as low functioning.  Has been on levothyroxine 100.  Recent TSH at upper end of normal (TSH-5.08).  Given that she had been out of multiple other meds     Previously, uncertain if this may have impacted     her prior / recent function.   Now with worsening fatigue and dyspnea.  - get new labs, for symptomatic change.

## 2021-09-04 NOTE — PROGRESS NOTES
Established Patient     Jaimee Serrano is a 56 y.o. female.    Chief Complaint   Patient presents with   • Results     ct scan    • Palpitations     requesting ekg              Problems addressed this visit:     This note uses problem-based documentation.  Subjective HPI is included in Assessment & Plan, at bottom of note.   Problem   Mediastinal Lymphadenopathy   Hilar Lymphadenopathy   Cancer with pulmonary metastases (HCC) (possible)    Palpitations   Other Fatigue   Orthopnea   Dyspnea On Exertion   Acquired Hypothyroidism                            Past Medical History:   Diagnosis Date   • Acquired hypothyroidism 6/14/2021   • Essential hypertension 6/14/2021   • IBD (inflammatory bowel disease) 6/14/2021    Chron's   • MALT lymphoma (HCC) 6/14/2021   • Pulmonary nodules 6/14/2021   • Type 2 diabetes mellitus without complication, with long-term current use of insulin (HCC) 6/14/2021       Patient Active Problem List   Diagnosis   • IBD (inflammatory bowel disease)   • MALT lymphoma (HCC)   • Endometrioid adenocarcinoma of uterus (HCC)   • Type 2 diabetes mellitus without complication, with long-term current use of insulin (HCC)   • Acquired hypothyroidism   • Gastroesophageal reflux disease without esophagitis   • Essential hypertension   • Other hyperlipidemia   • Pulmonary nodules   • Chronic bilateral low back pain without sciatica   • Elevated ALT measurement   • Mediastinal lymphadenopathy   • Hilar lymphadenopathy   • Cancer with pulmonary metastases (HCC) (possible)    • Palpitations   • Other fatigue   • Orthopnea   • Dyspnea on exertion       Past Surgical History:   Procedure Laterality Date   • ABDOMINAL HYSTERECTOMY TOTAL      NADER + SBO       Family History   Problem Relation Age of Onset   • No Known Problems Other        Social History     Tobacco Use   • Smoking status: Never Smoker   • Smokeless tobacco: Never Used   Substance Use Topics   • Alcohol use: Yes     Comment: 2 times a year  "      Current medications:  (including new changes):  Current Outpatient Medications   Medication Sig Dispense Refill   • meloxicam (MOBIC) 7.5 MG Tab Take 1 tablet by mouth every day. 30 tablet 1   • metformin (GLUCOPHAGE) 1000 MG tablet Take 1,000 mg by mouth 2 times a day with meals.     • levothyroxine (SYNTHROID) 100 MCG Tab Take 100 mcg by mouth every morning on an empty stomach.     • Dulaglutide (TRULICITY) 1.5 MG/0.5ML Solution Pen-injector Inject 0.5 mL under the skin every 7 days. 45 mL 1   • Insulin Degludec (TRESIBA FLEXTOUCH) 100 UNIT/ML Solution Pen-injector Inject 20 Units under the skin every morning. 1800 mL 1   • insulin lispro (HUMALOG KWIKPEN) 100 UNIT/ML Solution Pen-injector injection PEN Inject 10 Units under the skin 3 times a day before meals. 27 mL 1   • pantoprazole (PROTONIX) 20 MG tablet Take 1 tablet by mouth every day. 90 tablet 1   • ramipril (ALTACE) 2.5 MG Cap Take 1 capsule by mouth every day. 90 capsule 1   • simvastatin (ZOCOR) 10 MG Tab Take 1 tablet by mouth every evening. 90 tablet 1     No current facility-administered medications for this visit.       Allergies as of 09/03/2021   • (No Known Allergies)                   Review of Symptoms   (as noted elsewhere, and .....)   GEN/CONST:   Denies fever, chills,       + fatigue.   CARDIO:   Denies chest pain, or edema.      + palpitations and orthopnea   RESP:   Denies wheezing or coughing.     + dyspnea.  (possibly worse with recent smoke in air)  GI:    Denies nausea, vomiting,      + hx of Chron's dz with baseline of semi-loose stools          Physical Exam  /76 (BP Location: Left arm, Patient Position: Sitting, BP Cuff Size: Large adult)   Pulse 96   Temp 36.6 °C (97.9 °F) (Temporal)   Ht 1.676 m (5' 6\")   Wt 113 kg (249 lb)   SpO2 92%   BMI 40.19 kg/m²   General:  Alert and oriented, No apparent distress.        - large neck,  (unable to clearly evaluate for possible JVD)  Lungs: Clear to auscultation " bilaterally.  No wheezes or rales.  Cardiovascular: Regular rate and rhythm.  No murmurs, rubs or gallops.  Abdomen:  Soft.  Large, but Non-tender.  No rebound or guarding.   Extremities:  No leg edema.  Good general motion of extremities.       Labs:  Recent / Prior labs reviewed.    CBC, CMP, TSH, A1c and Vit.D     (too early to re-order A1c, per general insurance)  (had been off meds, for a while, when last A1c done).        Imaging:    CT - Chest/Abd/Plvs:   8/24/21:  1.  Findings compatible with pulmonary metastatic disease as above described. Correlation with prior imaging would be extremely helpful. Bilateral groundglass opacities many of which are nodular with cavitation.  2.  Cystic changes in the lungs bilaterally as above described. These may be related to prior infection/inflammation  3.  Mild bronchiectasis in the right middle lobe and bilateral lower lobes. Postsurgical changes in the left lower lobe.  4.  Enlarged mediastinal and right hilar lymph nodes are likely metastatic.  5.  Hepatomegaly and hepatic steatosis.  6.  Prominence of the common duct likely represent ectasia in the setting of prior cholecystectomy.                EKG Interpretation    (in-office):  Interpreted by me  Rate:       Normal  (96)   Rhythm:  Regular   Axis:        Left axis deviation  Ectopy:    None    QTc:     444  Conduction:      Normal   (low voltage)   ST Segments:  No ST elevations  T Waves:    No acute T-wave changes  Q Waves:   Not present  Clinical Impression:   Normal sinus rhythm.     With low voltage (likely from body habitus).    And left axis deviation     (no clear hypertrophy, but with low voltage)                            Assessment & Plan  (with subjective history and orders):    Problem List Items Addressed This Visit         Cancer with pulmonary metastases (HCC) (possible)      Patient has had previously treated MALT lymphoma, as well as endometrial adenocarcinoma, treated before, and being followed  by oncology (locally, as well as through [place], see care everywhere note from 9/1/2021).  Recent CT scan noted pulmonary nodules (and has had some before), also with enlarged lymph nodes and mediastinum and hilar regions, that radiology feels may be consistent with metastatic lung disease.  Additionally, the patient has had progressive worsening of fatigue, over the past 2 months....    [place] oncology is requesting bronchoscopy, for biopsy of LAD (work, possibly consider IR guided, if otherwise not able).    -Referral placed to pulmonology (urgent), to further evaluate for recent CT results, and to consider best approach for biopsy, and may need to coordinate with oncology.         Relevant Orders    REFERRAL TO PULMONARY AND SLEEP MEDICINE    CBC WITH DIFFERENTIAL    Comp Metabolic Panel    MAGNESIUM    Hilar lymphadenopathy    Relevant Orders    REFERRAL TO PULMONARY AND SLEEP MEDICINE    Mediastinal lymphadenopathy    Relevant Orders    REFERRAL TO PULMONARY AND SLEEP MEDICINE        Palpitations     Patient has previously noted and on sensation in her chest, over the past few weeks.  She is a describes this as alternating between a tingling sensation, and a fluttering sensation.  It is difficult for her to describe if she feels this is related to heartbeat, or abnormal sensation.  However, her [place] oncologist is recommending EKG and further evaluation.  EKG done in office, and did not note any arrhythmia, at this time.  However, she was not noticing symptoms, when this was done.    - Will place referral to cardiology, to consider Zio patch or loop recorder, to further assess for possible palpitations / arrhythmia, especially in the setting of her increasing fatigue.         Relevant Orders    REFERRAL TO CARDIOLOGY    TSH    FREE THYROXINE    TRIIDOTHYRONINE    CBC WITH DIFFERENTIAL    Comp Metabolic Panel    MAGNESIUM    EKG - Clinic Performed      Orthopnea     Patient now notes multiple year history of  having to sleep inclined, and has she notes a pressure sensation in her chest, when lying flat.  It is not entirely clear if this is musculoskeletal, or restrictive, or possibly related to her heart.  However, she is also noted dyspnea on exertion, and recently palpitations.  She does not have any pedal edema, (and difficult to evaluate JVD, given neck size).  -We will get echo, to evaluate heart function.         Relevant Orders    EC-ECHOCARDIOGRAM COMPLETE W/O CONT      Dyspnea on exertion     Patient notes progressive worsening of dyspnea on exertion.  She notes that she can generally walk to her mailbox and back, but has difficulty breathing with a degree of exertion beyond that.  Unclear for possible orthopnea.  Denies pedal edema.  Also in the setting of possible recurrence of cancer, she has previously had MALT, and endometrial lymphoma, previously treated.  Recent CT scans with pulmonary nodules, as well as apparently new lymphadenopathy and mediastinal and hilar regions.  -Get echo for dyspnea.         Relevant Orders    EC-ECHOCARDIOGRAM COMPLETE W/O CONT    CBC WITH DIFFERENTIAL    Comp Metabolic Panel      Other fatigue     Has had fatigue progressively getting worse   over past 2 months.   Has some loose stools, but also with Chron's dz.   (notes this is typical for her).   Notes heavy chest if laying flat.   Dyspnea after mailbox and back.   Recent CT with mediastinal and hilar LAD and pulm nodules.  Prior cancer, and in setting of possible recurrence.   EKG with low voltage, but large body habitus.   - get new thyroid labs.  (with hypothyroidism)   - get new echo.   (given dyspnea and orthopnea).          Relevant Orders    EC-ECHOCARDIOGRAM COMPLETE W/O CONT    TSH    FREE THYROXINE    TRIIDOTHYRONINE    CBC WITH DIFFERENTIAL    Comp Metabolic Panel    MAGNESIUM      Acquired hypothyroidism     Chronic and ongoing.   No prior surgeries, just described as low functioning.  Has been on levothyroxine  100.  Recent TSH at upper end of normal (TSH-5.08).  Given that she had been out of multiple other meds     Previously, uncertain if this may have impacted     her prior / recent function.   Now with worsening fatigue and dyspnea.  - get new labs, for symptomatic change.                         Diagnosis Table, with orders:  1. Malignant neoplasm metastatic to lung, unspecified laterality (HCC)  REFERRAL TO PULMONARY AND SLEEP MEDICINE    CBC WITH DIFFERENTIAL    Comp Metabolic Panel    MAGNESIUM   2. Mediastinal lymphadenopathy  REFERRAL TO PULMONARY AND SLEEP MEDICINE   3. Hilar lymphadenopathy  REFERRAL TO PULMONARY AND SLEEP MEDICINE   4. Palpitations  REFERRAL TO CARDIOLOGY    TSH    FREE THYROXINE    TRIIDOTHYRONINE    CBC WITH DIFFERENTIAL    Comp Metabolic Panel    MAGNESIUM    EKG - Clinic Performed   5. Orthopnea  EC-ECHOCARDIOGRAM COMPLETE W/O CONT   6. Dyspnea on exertion  EC-ECHOCARDIOGRAM COMPLETE W/O CONT    CBC WITH DIFFERENTIAL    Comp Metabolic Panel   7. Other fatigue  EC-ECHOCARDIOGRAM COMPLETE W/O CONT    TSH    FREE THYROXINE    TRIIDOTHYRONINE    CBC WITH DIFFERENTIAL    Comp Metabolic Panel    MAGNESIUM   8. Acquired hypothyroidism                   Follow-up:  As previously scheduled, and as needed.       &  Can notify on MyChart of new lab results.          &   With the new referrals, labs, and imaging.               A computerized dictation system may have been used in this note.    Despite review, there may be some errors in spelling or grammar.    Luis Eduardo Hamm M.D.  9/3/2021

## 2021-09-04 NOTE — ASSESSMENT & PLAN NOTE
Patient now notes multiple year history of having to sleep inclined, and has she notes a pressure sensation in her chest, when lying flat.  It is not entirely clear if this is musculoskeletal, or restrictive, or possibly related to her heart.  However, she is also noted dyspnea on exertion, and recently palpitations.  She does not have any pedal edema, (and difficult to evaluate JVD, given neck size).  -We will get echo, to evaluate heart function.

## 2021-09-04 NOTE — ASSESSMENT & PLAN NOTE
Patient has had previously treated MALT lymphoma, as well as endometrial adenocarcinoma, treated before, and being followed by oncology (locally, as well as through [place], see care everywhere note from 9/1/2021).  Recent CT scan noted pulmonary nodules (and has had some before), also with enlarged lymph nodes and mediastinum and hilar regions, that radiology feels may be consistent with metastatic lung disease.  Additionally, the patient has had progressive worsening of fatigue, over the past 2 months....    [place] oncology is requesting bronchoscopy, for biopsy of LAD (work, possibly consider IR guided, if otherwise not able).    -Referral placed to pulmonology (urgent), to further evaluate for recent CT results, and to consider best approach for biopsy, and may need to coordinate with oncology.

## 2021-09-04 NOTE — ASSESSMENT & PLAN NOTE
Patient has previously noted and on sensation in her chest, over the past few weeks.  She is a describes this as alternating between a tingling sensation, and a fluttering sensation.  It is difficult for her to describe if she feels this is related to heartbeat, or abnormal sensation.  However, her [place] oncologist is recommending EKG and further evaluation.  EKG done in office, and did not note any arrhythmia, at this time.  However, she was not noticing symptoms, when this was done.    - Will place referral to cardiology, to consider Zio patch or loop recorder, to further assess for possible palpitations / arrhythmia, especially in the setting of her increasing fatigue.

## 2021-09-07 ENCOUNTER — TELEPHONE (OUTPATIENT)
Dept: MEDICAL GROUP | Facility: PHYSICIAN GROUP | Age: 56
End: 2021-09-07

## 2021-09-07 DIAGNOSIS — R00.2 PALPITATIONS: ICD-10-CM

## 2021-09-08 ENCOUNTER — OFFICE VISIT (OUTPATIENT)
Dept: MEDICAL GROUP | Facility: PHYSICIAN GROUP | Age: 56
End: 2021-09-08
Payer: COMMERCIAL

## 2021-09-08 VITALS
SYSTOLIC BLOOD PRESSURE: 122 MMHG | OXYGEN SATURATION: 95 % | HEART RATE: 96 BPM | WEIGHT: 247 LBS | DIASTOLIC BLOOD PRESSURE: 74 MMHG | TEMPERATURE: 99.1 F | HEIGHT: 66 IN | RESPIRATION RATE: 16 BRPM | BODY MASS INDEX: 39.7 KG/M2

## 2021-09-08 DIAGNOSIS — Z02.89 ENCOUNTER FOR COMPLETION OF FORM WITH PATIENT: ICD-10-CM

## 2021-09-08 PROCEDURE — 99213 OFFICE O/P EST LOW 20 MIN: CPT | Performed by: STUDENT IN AN ORGANIZED HEALTH CARE EDUCATION/TRAINING PROGRAM

## 2021-09-08 ASSESSMENT — FIBROSIS 4 INDEX: FIB4 SCORE: 1.4

## 2021-09-08 NOTE — TELEPHONE ENCOUNTER
Called patient with her order information. Informed her that at the time of her appointment with Dr. Drummond, she can go to their office 30 minutes prior to her appointment and get her EKG done. Patient understood and had no further questions.

## 2021-09-08 NOTE — TELEPHONE ENCOUNTER
Please call the patient, and let her know that that her cardiology referral has been processed, and appears she has an appointment on November 3.  However, they are requiring a new EKG be done, 1-2 days prior to that.…  Please inform her that she can have this done in the cardiology office, at the [facility] (at [facility]), as a walk-in basis, or with appointment 1 or 2 days prior to her appointment there.  Thank you.  .........................  New order placed for cardiology EKG. (in order encounter).   1. Palpitations  EKG - Cardiology Performed   Luis Eduardo Hamm M.D., 9/7/2021

## 2021-09-08 NOTE — PROGRESS NOTES
Cardiology/Dr. Ocampo requiring a new EKG be done within 1-2 days, before the telemedicine appointment with him on November 3.…  New order placed for cardiology EKG.    Patient will be called to inform her of this.  1. Palpitations  EKG - Cardiology Performed   See telephone note....  Luis Eduardo Hamm M.D., 9/7/2021

## 2021-09-09 NOTE — ASSESSMENT & PLAN NOTE
"Patient with FMLA paperwork.  Patient presents with form for insurance/disability.    This was discussed with patient, and she verifies it is for FMLA/time off, rather than any full form of disability.  Due to her recent CT imaging, and likely pulmonary metastasis from one of her prior cancers (as she has had both MALT lymphoma, as well as endometrial adenocarcinoma), she will be having a bronchoscopy done for the hilar lymphadenopathy, to try and further diagnose this.  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      She has previously noted fatigue, dyspnea, and orthopnea, as well as fatigue, and \"mental fog\", aching it difficult to do her job.  Furthermore, she has to go out of state, for variety of testing at [facility], and will have to go back and forth periodically, or stay there for prolonged period, depending on her findings.  However, this will likely show to be cancer, requiring chemotherapy.  However, this decision, diagnosis, and treatment plan, will be developed and decided by her oncology team at [facility].    … Form completed for patient, but was explained to her, that her insurance may not want to provide future coverage for this, as the treatment plan has not been fully developed yet.  She is understanding of this, and wants the form completed at this time anyway.… I anticipate they will need further records from [facility] / oncology, and would be more likely to be more accommodating, after they have a treatment plan in place.    - Form completed per patient request.  - If they deny this type of request, then anticipating a different FMLA may be required for time off during necessary procedures, at which point they may wish to reevaluate the more prolonged version of time off, for her care.   - Anticipate that [facility] notes will be more beneficial, but we are not able to directly provide them to the company " (as they are outside notes)....  Patient understands she will have to request the notes from their office as well..

## 2021-09-09 NOTE — PROGRESS NOTES
Established Patient     Jaimee Serrano is a 56 y.o. female.    Chief Complaint   Patient presents with   • Paperwork     FMLA             Problems addressed this visit:     This note uses problem-based documentation.  Subjective HPI is included in Assessment & Plan, at bottom of note.   Problem   Encounter for Completion of Form With Patient                            Past Medical History:   Diagnosis Date   • Acquired hypothyroidism 6/14/2021   • Essential hypertension 6/14/2021   • IBD (inflammatory bowel disease) 6/14/2021    Chron's   • MALT lymphoma (HCC) 6/14/2021   • Pulmonary nodules 6/14/2021   • Type 2 diabetes mellitus without complication, with long-term current use of insulin (HCC) 6/14/2021       Patient Active Problem List   Diagnosis   • IBD (inflammatory bowel disease)   • MALT lymphoma (HCC)   • Endometrioid adenocarcinoma of uterus (HCC)   • Type 2 diabetes mellitus without complication, with long-term current use of insulin (HCC)   • Acquired hypothyroidism   • Gastroesophageal reflux disease without esophagitis   • Essential hypertension   • Other hyperlipidemia   • Pulmonary nodules   • Chronic bilateral low back pain without sciatica   • Elevated ALT measurement   • Mediastinal lymphadenopathy   • Hilar lymphadenopathy   • Cancer with pulmonary metastases (HCC) (possible)    • Palpitations   • Other fatigue   • Orthopnea   • Dyspnea on exertion   • Encounter for completion of form with patient       Past Surgical History:   Procedure Laterality Date   • ABDOMINAL HYSTERECTOMY TOTAL      NADER + SBO       Family History   Problem Relation Age of Onset   • No Known Problems Other        Social History     Tobacco Use   • Smoking status: Never Smoker   • Smokeless tobacco: Never Used   Substance Use Topics   • Alcohol use: Yes     Comment: 2 times a year       Current medications:  (including new changes):  Current Outpatient Medications   Medication Sig Dispense Refill   • meloxicam (MOBIC)  "7.5 MG Tab Take 1 tablet by mouth every day. 30 tablet 1   • metformin (GLUCOPHAGE) 1000 MG tablet Take 1,000 mg by mouth 2 times a day with meals.     • levothyroxine (SYNTHROID) 100 MCG Tab Take 100 mcg by mouth every morning on an empty stomach.     • Dulaglutide (TRULICITY) 1.5 MG/0.5ML Solution Pen-injector Inject 0.5 mL under the skin every 7 days. 45 mL 1   • Insulin Degludec (TRESIBA FLEXTOUCH) 100 UNIT/ML Solution Pen-injector Inject 20 Units under the skin every morning. 1800 mL 1   • insulin lispro (HUMALOG KWIKPEN) 100 UNIT/ML Solution Pen-injector injection PEN Inject 10 Units under the skin 3 times a day before meals. 27 mL 1   • pantoprazole (PROTONIX) 20 MG tablet Take 1 tablet by mouth every day. 90 tablet 1   • ramipril (ALTACE) 2.5 MG Cap Take 1 capsule by mouth every day. 90 capsule 1   • simvastatin (ZOCOR) 10 MG Tab Take 1 tablet by mouth every evening. 90 tablet 1     No current facility-administered medications for this visit.       Allergies as of 09/08/2021   • (No Known Allergies)                   Review of Symptoms   (as noted elsewhere, and .....)   GEN/CONST:   Denies fever, chills,       + fatigue, mental fog, difficulty thinking clearly.      + difficulty doing detailed work (as finance ).   CARDIO:   Denies chest pain, or edema.       + dyspnea, orthopnea  RESP:   Denies shortness of breath, or coughing.     + dyspnea.   GI:    Denies nausea, vomiting, diarrhea,           Physical Exam  /74 (BP Location: Right arm, Patient Position: Sitting, BP Cuff Size: Large adult)   Pulse 96   Temp 37.3 °C (99.1 °F) (Temporal)   Resp 16   Ht 1.676 m (5' 6\")   Wt 112 kg (247 lb)   SpO2 95%   BMI 39.87 kg/m²   General:  Alert and oriented,  No acute / urgent distress,     + appears tired.    Neck: Trachea midline,  Large neck / obese.   Lungs: Easy / unlabored breathing, without difficulty, at rest.        - Good oxygenation.   MSK:  Good general motion of extremities. Normal " ambulation.    Psych:     +  Appears tired and anxious about upcoming treatments,                 and need for time off from work....        Labs:  Reviewed:  CBC, CA-125,     Imaging:  CT- Chest, Abdomen, Pelvis:  8/24/2021:  Detailed report also describing specific pulmonary nodules, and LAD...  Radiology Impression:  (quote):  1.  Findings compatible with pulmonary metastatic disease as above described. Correlation with prior imaging would be extremely helpful. Bilateral groundglass opacities many of which are nodular with cavitation.  2.  Cystic changes in the lungs bilaterally as above described. These may be related to prior infection/inflammation  3.  Mild bronchiectasis in the right middle lobe and bilateral lower lobes. Postsurgical changes in the left lower lobe.  4.  Enlarged mediastinal and right hilar lymph nodes are likely metastatic.  5.  Hepatomegaly and hepatic steatosis.  6.  Prominence of the common duct likely represent ectasia in the setting of prior cholecystectomy.      Also....  Pending (scheduled in future, or post-referrals)....       - Echo, and cardiology consult for possible loop/zio-patch,       - Bronchoscopy, and pulmonology consult.                             Assessment & Plan  (with subjective history and orders):  Of note, the list below is not in a specific nor sortable order.      Problem List Items Addressed This Visit     Encounter for completion of form with patient     Patient with FMLA paperwork.  Patient presents with form for insurance/disability.    This was discussed with patient, and she verifies it is for FMLA/time off, rather than any full form of disability.  Due to her recent CT imaging, and likely pulmonary metastasis from one of her prior cancers (as she has had both MALT lymphoma, as well as endometrial adenocarcinoma), she will be having a bronchoscopy done for the hilar lymphadenopathy, to try and further diagnose this.  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      She has previously noted fatigue, dyspnea, and orthopnea, as well as fatigue, and \"mental fog\", aching it difficult to do her job.  Furthermore, she has to go out of state, for variety of testing at [facility], and will have to go back and forth periodically, or stay there for prolonged period, depending on her findings.  However, this will likely show to be cancer, requiring chemotherapy.  However, this decision, diagnosis, and treatment plan, will be developed and decided by her oncology team at [facility].    … Form completed for patient, but was explained to her, that her insurance may not want to provide future coverage for this, as the treatment plan has not been fully developed yet.  She is understanding of this, and wants the form completed at this time anyway.… I anticipate they will need further records from [facility] / oncology, and would be more likely to be more accommodating, after they have a treatment plan in place.    - Form completed per patient request.  - If they deny this type of request, then anticipating a different FMLA may be required for time off during necessary procedures, at which point they may wish to reevaluate the more prolonged version of time off, for her care.   - Anticipate that [facility] notes will be more beneficial, but we are not able to directly provide them to the company (as they are outside notes)....  Patient understands she will have to request the notes from their office as well..                         Diagnosis Table, with orders:  1. Encounter for completion of form with patient                   Follow-up:  As scheduled ....    ... [facility] / Oncology managing most of the issues.....              A computerized dictation system may have been used in this note.    Despite review, there may be some errors in spelling or grammar.    Luis Eduardo Hamm M.D.  9/8/2021 "

## 2021-09-19 ENCOUNTER — HOSPITAL ENCOUNTER (OUTPATIENT)
Facility: MEDICAL CENTER | Age: 56
End: 2021-09-19
Attending: PHYSICIAN ASSISTANT
Payer: COMMERCIAL

## 2021-09-19 ENCOUNTER — OFFICE VISIT (OUTPATIENT)
Dept: URGENT CARE | Facility: PHYSICIAN GROUP | Age: 56
End: 2021-09-19
Payer: COMMERCIAL

## 2021-09-19 VITALS
HEIGHT: 66 IN | WEIGHT: 248 LBS | OXYGEN SATURATION: 95 % | DIASTOLIC BLOOD PRESSURE: 80 MMHG | SYSTOLIC BLOOD PRESSURE: 122 MMHG | HEART RATE: 93 BPM | TEMPERATURE: 97 F | BODY MASS INDEX: 39.86 KG/M2 | RESPIRATION RATE: 14 BRPM

## 2021-09-19 DIAGNOSIS — N12 PYELONEPHRITIS: ICD-10-CM

## 2021-09-19 DIAGNOSIS — R30.0 DYSURIA: ICD-10-CM

## 2021-09-19 DIAGNOSIS — R81 GLYCOSURIA: ICD-10-CM

## 2021-09-19 DIAGNOSIS — R39.15 URINARY URGENCY: ICD-10-CM

## 2021-09-19 LAB
APPEARANCE UR: NORMAL
BILIRUB UR STRIP-MCNC: NORMAL MG/DL
COLOR UR AUTO: NORMAL
GLUCOSE BLD-MCNC: 203 MG/DL (ref 70–100)
GLUCOSE UR STRIP.AUTO-MCNC: 250 MG/DL
KETONES UR STRIP.AUTO-MCNC: NORMAL MG/DL
LEUKOCYTE ESTERASE UR QL STRIP.AUTO: 0.2
NITRITE UR QL STRIP.AUTO: NORMAL
PH UR STRIP.AUTO: 5 [PH] (ref 5–8)
PROT UR QL STRIP: NORMAL MG/DL
RBC UR QL AUTO: NORMAL
SP GR UR STRIP.AUTO: >=1.03
UROBILINOGEN UR STRIP-MCNC: 5 MG/DL

## 2021-09-19 PROCEDURE — 87086 URINE CULTURE/COLONY COUNT: CPT

## 2021-09-19 PROCEDURE — 82962 GLUCOSE BLOOD TEST: CPT | Performed by: PHYSICIAN ASSISTANT

## 2021-09-19 PROCEDURE — 81002 URINALYSIS NONAUTO W/O SCOPE: CPT | Performed by: PHYSICIAN ASSISTANT

## 2021-09-19 PROCEDURE — 99204 OFFICE O/P NEW MOD 45 MIN: CPT | Performed by: PHYSICIAN ASSISTANT

## 2021-09-19 RX ORDER — CEFDINIR 300 MG/1
300 CAPSULE ORAL EVERY 12 HOURS
Qty: 10 CAPSULE | Refills: 0 | Status: SHIPPED | OUTPATIENT
Start: 2021-09-19 | End: 2021-09-24

## 2021-09-19 ASSESSMENT — FIBROSIS 4 INDEX: FIB4 SCORE: 1.4

## 2021-09-19 NOTE — PROGRESS NOTES
Chief Complaint   Patient presents with   • Painful Urination     x 3 days        HISTORY OF PRESENT ILLNESS: Patient is a 56 y.o. female who presents today for the following:    Dysuria x 3 days  H/o the same with negative UA  Increased frequency/urgency  Denies fever, body aches, chills, N/V, belly pain, back pain    Patient Active Problem List    Diagnosis Date Noted   • Encounter for completion of form with patient 09/08/2021   • Mediastinal lymphadenopathy 09/03/2021   • Hilar lymphadenopathy 09/03/2021   • Cancer with pulmonary metastases (HCC) (possible)  09/03/2021   • Palpitations 09/03/2021   • Other fatigue 09/03/2021   • Orthopnea 09/03/2021   • Dyspnea on exertion 09/03/2021   • Chronic bilateral low back pain without sciatica 06/24/2021   • Elevated ALT measurement 06/24/2021   • IBD (inflammatory bowel disease) 06/14/2021   • MALT lymphoma ([facility]) 06/14/2021   • Endometrioid adenocarcinoma of uterus ([facility]) 06/14/2021   • Type 2 diabetes mellitus without complication, with long-term current use of insulin ([facility]) 06/14/2021   • Acquired hypothyroidism 06/14/2021   • Gastroesophageal reflux disease without esophagitis 06/14/2021   • Essential hypertension 06/14/2021   • Other hyperlipidemia 06/14/2021   • Pulmonary nodules 06/14/2021       Allergies:Patient has no known allergies.    Current Outpatient Medications Ordered in Epic   Medication Sig Dispense Refill   • cefdinir (OMNICEF) 300 MG Cap Take 1 Capsule by mouth every 12 hours for 5 days. 10 Capsule 0   • HUMALOG KWIKPEN 100 UNIT/ML Solution Pen-injector injection PEN INJECT 10 UNITS UNDER THE SKIN THREE TIMES A DAY BEFORE MEALS 15 mL 3   • meloxicam (MOBIC) 7.5 MG Tab Take 1 tablet by mouth every day. 30 tablet 1   • metformin (GLUCOPHAGE) 1000 MG tablet Take 1,000 mg by mouth 2 times a day with meals.     • levothyroxine (SYNTHROID) 100 MCG Tab Take 100 mcg by mouth every morning on an empty stomach.     • Dulaglutide (TRULICITY) 1.5 MG/0.5ML  "Solution Pen-injector Inject 0.5 mL under the skin every 7 days. 45 mL 1   • Insulin Degludec (TRESIBA FLEXTOUCH) 100 UNIT/ML Solution Pen-injector Inject 20 Units under the skin every morning. 1800 mL 1   • pantoprazole (PROTONIX) 20 MG tablet Take 1 tablet by mouth every day. 90 tablet 1   • ramipril (ALTACE) 2.5 MG Cap Take 1 capsule by mouth every day. 90 capsule 1   • simvastatin (ZOCOR) 10 MG Tab Take 1 tablet by mouth every evening. 90 tablet 1     No current Epic-ordered facility-administered medications on file.       Past Medical History:   Diagnosis Date   • Acquired hypothyroidism 6/14/2021   • Essential hypertension 6/14/2021   • IBD (inflammatory bowel disease) 6/14/2021    Chron's   • MALT lymphoma (HCC) 6/14/2021   • Pulmonary nodules 6/14/2021   • Type 2 diabetes mellitus without complication, with long-term current use of insulin (HCC) 6/14/2021       Social History     Tobacco Use   • Smoking status: Never Smoker   • Smokeless tobacco: Never Used   Vaping Use   • Vaping Use: Never used   Substance Use Topics   • Alcohol use: Yes     Comment: 2 times a year   • Drug use: Not Currently       Family Status   Relation Name Status   • OTHER  (Not Specified)     Family History   Problem Relation Age of Onset   • No Known Problems Other        Review of Systems:    Constitutional ROS: No unexpected change in weight, No weakness, No fatigue  Pulmonary ROS: No chronic cough, sputum, or hemoptysis, No dyspnea on exertion, No wheezing  Cardiovascular ROS: No diaphoresis, No edema, No palpitations  Gastrointestinal ROS: No change in bowel habits, No significant change in appetite, No nausea, vomiting, diarrhea, or constipation  Hematologic/Lymphatic ROS: No chills, No night sweats, No weight loss  Skin/Integumentary ROS: No edema, No evidence of rash, No itching    Exam:  /80   Pulse 93   Temp 36.1 °C (97 °F) (Temporal)   Resp 14   Ht 1.676 m (5' 6\")   Wt 112 kg (248 lb)   SpO2 95%   General: Well " developed, well nourished. No distress.  Pulmonary: Unlabored respiratory effort.   Back: No CVA tenderness noted.  Neurologic: Grossly nonfocal. No facial asymmetry noted.  Skin: Warm, dry, good turgor. No rashes in visible areas.   Psych: Normal mood. Alert and oriented x3. Judgment and insight is normal.    UA: + blood, 250 glucose    Glucose: 203    Assessment/Plan:  Drink plenty of fluids. Will contact patient with culture results. Use all medication as directed. Follow up for worsening or persistent symptoms.  1. Dysuria  POCT Urinalysis    Urine Culture    cefdinir (OMNICEF) 300 MG Cap   2. Pyelonephritis  POCT Urinalysis    Urine Culture    cefdinir (OMNICEF) 300 MG Cap   3. Urinary urgency  POCT Urinalysis    Urine Culture    cefdinir (OMNICEF) 300 MG Cap   4. Glycosuria  POCT glucose

## 2021-09-20 DIAGNOSIS — N12 PYELONEPHRITIS: ICD-10-CM

## 2021-09-20 DIAGNOSIS — R30.0 DYSURIA: ICD-10-CM

## 2021-09-20 DIAGNOSIS — R39.15 URINARY URGENCY: ICD-10-CM

## 2021-09-23 LAB
BACTERIA UR CULT: NORMAL
SIGNIFICANT IND 70042: NORMAL
SITE SITE: NORMAL
SOURCE SOURCE: NORMAL

## 2021-10-12 ENCOUNTER — HOSPITAL ENCOUNTER (OUTPATIENT)
Dept: LAB | Facility: MEDICAL CENTER | Age: 56
End: 2021-10-12
Attending: STUDENT IN AN ORGANIZED HEALTH CARE EDUCATION/TRAINING PROGRAM
Payer: COMMERCIAL

## 2021-10-12 DIAGNOSIS — C78.00 MALIGNANT NEOPLASM METASTATIC TO LUNG, UNSPECIFIED LATERALITY (HCC): ICD-10-CM

## 2021-10-12 DIAGNOSIS — R00.2 PALPITATIONS: ICD-10-CM

## 2021-10-12 DIAGNOSIS — R06.09 DYSPNEA ON EXERTION: ICD-10-CM

## 2021-10-12 DIAGNOSIS — R53.83 OTHER FATIGUE: ICD-10-CM

## 2021-10-12 LAB
ALBUMIN SERPL BCP-MCNC: 3.8 G/DL (ref 3.2–4.9)
ALBUMIN/GLOB SERPL: 1 G/DL
ALP SERPL-CCNC: 88 U/L (ref 30–99)
ALT SERPL-CCNC: 34 U/L (ref 2–50)
ANION GAP SERPL CALC-SCNC: 11 MMOL/L (ref 7–16)
AST SERPL-CCNC: 29 U/L (ref 12–45)
BASOPHILS # BLD AUTO: 0.5 % (ref 0–1.8)
BASOPHILS # BLD: 0.03 K/UL (ref 0–0.12)
BILIRUB SERPL-MCNC: 0.4 MG/DL (ref 0.1–1.5)
BUN SERPL-MCNC: 20 MG/DL (ref 8–22)
CALCIUM SERPL-MCNC: 9 MG/DL (ref 8.5–10.5)
CHLORIDE SERPL-SCNC: 101 MMOL/L (ref 96–112)
CO2 SERPL-SCNC: 25 MMOL/L (ref 20–33)
CREAT SERPL-MCNC: 0.7 MG/DL (ref 0.5–1.4)
EOSINOPHIL # BLD AUTO: 0.12 K/UL (ref 0–0.51)
EOSINOPHIL NFR BLD: 1.9 % (ref 0–6.9)
ERYTHROCYTE [DISTWIDTH] IN BLOOD BY AUTOMATED COUNT: 43.7 FL (ref 35.9–50)
GLOBULIN SER CALC-MCNC: 3.9 G/DL (ref 1.9–3.5)
GLUCOSE SERPL-MCNC: 125 MG/DL (ref 65–99)
HCT VFR BLD AUTO: 41.9 % (ref 37–47)
HGB BLD-MCNC: 13.2 G/DL (ref 12–16)
IMM GRANULOCYTES # BLD AUTO: 0.02 K/UL (ref 0–0.11)
IMM GRANULOCYTES NFR BLD AUTO: 0.3 % (ref 0–0.9)
LYMPHOCYTES # BLD AUTO: 2.29 K/UL (ref 1–4.8)
LYMPHOCYTES NFR BLD: 35.9 % (ref 22–41)
MAGNESIUM SERPL-MCNC: 1.8 MG/DL (ref 1.5–2.5)
MCH RBC QN AUTO: 25.7 PG (ref 27–33)
MCHC RBC AUTO-ENTMCNC: 31.5 G/DL (ref 33.6–35)
MCV RBC AUTO: 81.5 FL (ref 81.4–97.8)
MONOCYTES # BLD AUTO: 0.74 K/UL (ref 0–0.85)
MONOCYTES NFR BLD AUTO: 11.6 % (ref 0–13.4)
NEUTROPHILS # BLD AUTO: 3.18 K/UL (ref 2–7.15)
NEUTROPHILS NFR BLD: 49.8 % (ref 44–72)
NRBC # BLD AUTO: 0 K/UL
NRBC BLD-RTO: 0 /100 WBC
PLATELET # BLD AUTO: 196 K/UL (ref 164–446)
PMV BLD AUTO: 11.6 FL (ref 9–12.9)
POTASSIUM SERPL-SCNC: 4 MMOL/L (ref 3.6–5.5)
PROT SERPL-MCNC: 7.7 G/DL (ref 6–8.2)
RBC # BLD AUTO: 5.14 M/UL (ref 4.2–5.4)
SODIUM SERPL-SCNC: 137 MMOL/L (ref 135–145)
T3 SERPL-MCNC: 126 NG/DL (ref 60–181)
T4 FREE SERPL-MCNC: 1.31 NG/DL (ref 0.93–1.7)
TSH SERPL DL<=0.005 MIU/L-ACNC: 3.25 UIU/ML (ref 0.38–5.33)
WBC # BLD AUTO: 6.4 K/UL (ref 4.8–10.8)

## 2021-10-12 PROCEDURE — 83735 ASSAY OF MAGNESIUM: CPT

## 2021-10-12 PROCEDURE — 80053 COMPREHEN METABOLIC PANEL: CPT

## 2021-10-12 PROCEDURE — 36415 COLL VENOUS BLD VENIPUNCTURE: CPT

## 2021-10-12 PROCEDURE — 85025 COMPLETE CBC W/AUTO DIFF WBC: CPT

## 2021-10-12 PROCEDURE — 84439 ASSAY OF FREE THYROXINE: CPT

## 2021-10-12 PROCEDURE — 84480 ASSAY TRIIODOTHYRONINE (T3): CPT

## 2021-10-12 PROCEDURE — 84443 ASSAY THYROID STIM HORMONE: CPT

## 2021-10-13 ENCOUNTER — HOSPITAL ENCOUNTER (OUTPATIENT)
Dept: LAB | Facility: MEDICAL CENTER | Age: 56
End: 2021-10-13
Attending: STUDENT IN AN ORGANIZED HEALTH CARE EDUCATION/TRAINING PROGRAM
Payer: COMMERCIAL

## 2021-10-13 ENCOUNTER — OFFICE VISIT (OUTPATIENT)
Dept: MEDICAL GROUP | Facility: PHYSICIAN GROUP | Age: 56
End: 2021-10-13
Payer: COMMERCIAL

## 2021-10-13 ENCOUNTER — TELEPHONE (OUTPATIENT)
Dept: MEDICAL GROUP | Facility: PHYSICIAN GROUP | Age: 56
End: 2021-10-13

## 2021-10-13 VITALS
DIASTOLIC BLOOD PRESSURE: 72 MMHG | WEIGHT: 248 LBS | OXYGEN SATURATION: 93 % | SYSTOLIC BLOOD PRESSURE: 138 MMHG | HEIGHT: 66 IN | TEMPERATURE: 96.9 F | HEART RATE: 87 BPM | RESPIRATION RATE: 16 BRPM | BODY MASS INDEX: 39.86 KG/M2

## 2021-10-13 DIAGNOSIS — R89.9 ABNORMAL LABORATORY TEST: ICD-10-CM

## 2021-10-13 DIAGNOSIS — G89.29 CHRONIC BILATERAL LOW BACK PAIN WITHOUT SCIATICA: ICD-10-CM

## 2021-10-13 DIAGNOSIS — E03.9 ACQUIRED HYPOTHYROIDISM: ICD-10-CM

## 2021-10-13 DIAGNOSIS — Z02.89 ENCOUNTER FOR COMPLETION OF FORM WITH PATIENT: ICD-10-CM

## 2021-10-13 DIAGNOSIS — M54.50 CHRONIC BILATERAL LOW BACK PAIN WITHOUT SCIATICA: ICD-10-CM

## 2021-10-13 DIAGNOSIS — Z23 NEED FOR VACCINATION: ICD-10-CM

## 2021-10-13 PROCEDURE — 90732 PPSV23 VACC 2 YRS+ SUBQ/IM: CPT | Performed by: STUDENT IN AN ORGANIZED HEALTH CARE EDUCATION/TRAINING PROGRAM

## 2021-10-13 PROCEDURE — 90471 IMMUNIZATION ADMIN: CPT | Performed by: STUDENT IN AN ORGANIZED HEALTH CARE EDUCATION/TRAINING PROGRAM

## 2021-10-13 PROCEDURE — 99213 OFFICE O/P EST LOW 20 MIN: CPT | Mod: 25 | Performed by: STUDENT IN AN ORGANIZED HEALTH CARE EDUCATION/TRAINING PROGRAM

## 2021-10-13 PROCEDURE — 90686 IIV4 VACC NO PRSV 0.5 ML IM: CPT | Performed by: STUDENT IN AN ORGANIZED HEALTH CARE EDUCATION/TRAINING PROGRAM

## 2021-10-13 PROCEDURE — 83540 ASSAY OF IRON: CPT

## 2021-10-13 PROCEDURE — 83550 IRON BINDING TEST: CPT

## 2021-10-13 PROCEDURE — 90472 IMMUNIZATION ADMIN EACH ADD: CPT | Performed by: STUDENT IN AN ORGANIZED HEALTH CARE EDUCATION/TRAINING PROGRAM

## 2021-10-13 PROCEDURE — 82728 ASSAY OF FERRITIN: CPT

## 2021-10-13 PROCEDURE — 36415 COLL VENOUS BLD VENIPUNCTURE: CPT

## 2021-10-13 ASSESSMENT — FIBROSIS 4 INDEX: FIB4 SCORE: 1.45

## 2021-10-13 NOTE — ASSESSMENT & PLAN NOTE
Chronic and ongoing.   No prior surgeries, just described as low functioning.  Has been on levothyroxine 100.  Recently with worsening fatigue and dyspnea.  New labs with normal TSH, fT4, and T3...  - continue on same - Levothyroxine 100

## 2021-10-13 NOTE — ASSESSMENT & PLAN NOTE
"Patient with another / new version of FMLA paperwork.    This was discussed with patient, and she verifies it is for FMLA/time off, rather than any full form of disability.  Due to her recent CT imaging, and likely pulmonary metastasis from one of her prior cancers (as she has had both MALT lymphoma, as well as endometrial adenocarcinoma), she has had a bronchoscopy done for the hilar lymphadenopathy, to try and further diagnose this.  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    She has previously noted fatigue, dyspnea, and orthopnea, as well as fatigue, and \"mental fog\", aching it difficult to do her job.  Furthermore, she has to go out of state, for variety of testing at [facility], and will have to go back and forth periodically, or stay there for prolonged period, depending on her findings.  However, this will likely show to be cancer, requiring chemotherapy.  However, this decision, diagnosis, and treatment plan, will be developed and decided by her oncology team at [facility].    … Form completed for patient, but was explained to her, that her insurance may not want to provide future coverage for this, as the treatment plan has not been fully developed yet.  She is understanding of this, and wants the form completed at this time anyway.… I anticipate they will need further records from [facility] / oncology, and would be more likely to be more accommodating, after they have a treatment plan in place.    - Form completed per patient request.  - If they deny this type of request, then anticipating a different FMLA may be required for time off during necessary procedures, at which point they may wish to reevaluate the more prolonged version of time off, for her care.   - Anticipate that [facility] notes will be more beneficial, but we are not " able to directly provide them to the company (as they are outside notes)....  Patient understands she will have to request the notes from their office as well...

## 2021-10-13 NOTE — TELEPHONE ENCOUNTER
Pt is requesting Physical Therapy in [place]. Can your resubmit the referral and specify [place] office.

## 2021-10-13 NOTE — TELEPHONE ENCOUNTER
Placing new order for PT (in [place]), per pt request. ...  1. Chronic bilateral low back pain without sciatica  REFERRAL TO PHYSICAL THERAPY   Luis Eduardo Hamm M.D., 10/13/2021

## 2021-10-13 NOTE — ASSESSMENT & PLAN NOTE
Slightly low MCH and MCHC.   MCV at low end of normal.   Previously told low in iron?  Also tired.   -get iron panel.

## 2021-10-13 NOTE — PROGRESS NOTES
Established Patient     Jaimee Serrano is a 56 y.o. female.    Chief Complaint   Patient presents with   • Results     lab and FMLA             Problems addressed this visit:     This note uses problem-based documentation.  Subjective HPI is included in Assessment & Plan, at bottom of note.   Problem   Abnormal Laboratory Test   Encounter for Completion of Form With Patient   Acquired Hypothyroidism                           Past Medical History:   Diagnosis Date   • Acquired hypothyroidism 6/14/2021   • Essential hypertension 6/14/2021   • IBD (inflammatory bowel disease) 6/14/2021    Chron's   • MALT lymphoma (HCC) 6/14/2021   • Pulmonary nodules 6/14/2021   • Type 2 diabetes mellitus without complication, with long-term current use of insulin (HCC) 6/14/2021       Patient Active Problem List   Diagnosis   • IBD (inflammatory bowel disease)   • MALT lymphoma (HCC)   • Endometrioid adenocarcinoma of uterus (HCC)   • Type 2 diabetes mellitus without complication, with long-term current use of insulin (HCC)   • Acquired hypothyroidism   • Gastroesophageal reflux disease without esophagitis   • Essential hypertension   • Other hyperlipidemia   • Pulmonary nodules   • Chronic bilateral low back pain without sciatica   • Elevated ALT measurement   • Mediastinal lymphadenopathy   • Hilar lymphadenopathy   • Cancer with pulmonary metastases (HCC) (possible)    • Palpitations   • Other fatigue   • Orthopnea   • Dyspnea on exertion   • Encounter for completion of form with patient   • Abnormal laboratory test       Past Surgical History:   Procedure Laterality Date   • ABDOMINAL HYSTERECTOMY TOTAL      NADER + SBO       Family History   Problem Relation Age of Onset   • No Known Problems Other        Social History     Tobacco Use   • Smoking status: Never Smoker   • Smokeless tobacco: Never Used   Substance Use Topics   • Alcohol use: Yes     Comment: 2 times a year       Current medications:  (including new  "changes):  Current Outpatient Medications   Medication Sig Dispense Refill   • HUMALOG KWIKPEN 100 UNIT/ML Solution Pen-injector injection PEN INJECT 10 UNITS UNDER THE SKIN THREE TIMES A DAY BEFORE MEALS 15 mL 3   • meloxicam (MOBIC) 7.5 MG Tab Take 1 tablet by mouth every day. 30 tablet 1   • metformin (GLUCOPHAGE) 1000 MG tablet Take 1,000 mg by mouth 2 times a day with meals.     • levothyroxine (SYNTHROID) 100 MCG Tab Take 100 mcg by mouth every morning on an empty stomach.     • Dulaglutide (TRULICITY) 1.5 MG/0.5ML Solution Pen-injector Inject 0.5 mL under the skin every 7 days. 45 mL 1   • Insulin Degludec (TRESIBA FLEXTOUCH) 100 UNIT/ML Solution Pen-injector Inject 20 Units under the skin every morning. 1800 mL 1   • pantoprazole (PROTONIX) 20 MG tablet Take 1 tablet by mouth every day. 90 tablet 1   • ramipril (ALTACE) 2.5 MG Cap Take 1 capsule by mouth every day. 90 capsule 1   • simvastatin (ZOCOR) 10 MG Tab Take 1 tablet by mouth every evening. 90 tablet 1     No current facility-administered medications for this visit.       Allergies as of 10/13/2021   • (No Known Allergies)                   Review of Symptoms   (as noted elsewhere, and .....)   GEN/CONST:   Denies fever, chills,       + fatigue and difficulty concentrating (\"mary fog\").   CARDIO:   Denies chest pain, or edema.    RESP:   Denies wheezing  or coughing.  GI:    Denies nausea, vomiting, diarrhea,          Physical Exam  /72 (BP Location: Right arm, Patient Position: Sitting, BP Cuff Size: Large adult)   Pulse 87   Temp 36.1 °C (96.9 °F) (Temporal)   Resp 16   Ht 1.676 m (5' 6\")   Wt 112 kg (248 lb)   SpO2 93%   BMI 40.03 kg/m²   General:  Alert and oriented, No apparent distress.  Neck: Trachea midline, no masses, no obvious thyromegaly.  Lungs: Easy / unlabored breathing, without difficulty.  Good oxygenation.   MSK:  Good general motion of extremities.   Psych: Appears to have normal mood and affect,    but + appears " "tired.          Labs:  Recent / Prior labs reviewed.    CBC, CMP, TSH and fT4/T3                             Assessment & Plan  (with subjective history and orders):  Of note, the list below is not in a specific nor sortable order.      Problem List Items Addressed This Visit     Abnormal laboratory test     Slightly low MCH and MCHC.   MCV at low end of normal.   Previously told low in iron?  Also tired.   -get iron panel.          Relevant Orders    IRON/TOTAL IRON BIND    FERRITIN    Acquired hypothyroidism     Chronic and ongoing.   No prior surgeries, just described as low functioning.  Has been on levothyroxine 100.  Recently with worsening fatigue and dyspnea.  New labs with normal TSH, fT4, and T3...  - continue on same - Levothyroxine 100         Encounter for completion of form with patient     Patient with another / new version of FMLA paperwork.    This was discussed with patient, and she verifies it is for FMLA/time off, rather than any full form of disability.  Due to her recent CT imaging, and likely pulmonary metastasis from one of her prior cancers (as she has had both MALT lymphoma, as well as endometrial adenocarcinoma), she has had a bronchoscopy done for the hilar lymphadenopathy, to try and further diagnose this.  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    She has previously noted fatigue, dyspnea, and orthopnea, as well as fatigue, and \"mental fog\", aching it difficult to do her job.  Furthermore, she has to go out of state, for variety of testing at [facility], and will have to go back and forth periodically, or stay there for prolonged period, depending on her findings.  However, this will likely show to be cancer, requiring chemotherapy.  However, this decision, diagnosis, and treatment plan, will be developed and " decided by her oncology team at [facility].    … Form completed for patient, but was explained to her, that her insurance may not want to provide future coverage for this, as the treatment plan has not been fully developed yet.  She is understanding of this, and wants the form completed at this time anyway.… I anticipate they will need further records from [facility] / oncology, and would be more likely to be more accommodating, after they have a treatment plan in place.    - Form completed per patient request.  - If they deny this type of request, then anticipating a different FMLA may be required for time off during necessary procedures, at which point they may wish to reevaluate the more prolonged version of time off, for her care.   - Anticipate that [facility] notes will be more beneficial, but we are not able to directly provide them to the company (as they are outside notes)....  Patient understands she will have to request the notes from their office as well...            Other Visit Diagnoses     Need for vaccination        Relevant Orders    INFLUENZA VACCINE QUAD INJ (PF)    Pneumovax Vaccine (PPSV23)        Of note, the above list is not in a specific nor sortable order.                  Diagnosis Table, with orders:  1. Acquired hypothyroidism     2. Encounter for completion of form with patient     3. Abnormal laboratory test  IRON/TOTAL IRON BIND    FERRITIN   4. Need for vaccination  INFLUENZA VACCINE QUAD INJ (PF)    Pneumovax Vaccine (PPSV23)                 Follow-up:  2 months (DM-2, with in-office A1c).               A computerized dictation system may have been used in this note.    Despite review, there may be some errors in spelling or grammar.    Luis Eduardo Hamm M.D.  10/13/2021

## 2021-10-14 LAB
FERRITIN SERPL-MCNC: 16.1 NG/ML (ref 10–291)
IRON SATN MFR SERPL: 16 % (ref 15–55)
IRON SERPL-MCNC: 63 UG/DL (ref 40–170)
TIBC SERPL-MCNC: 395 UG/DL (ref 250–450)
UIBC SERPL-MCNC: 332 UG/DL (ref 110–370)

## 2021-10-28 ENCOUNTER — TELEPHONE (OUTPATIENT)
Dept: CARDIOLOGY | Facility: MEDICAL CENTER | Age: 56
End: 2021-10-28

## 2021-10-28 DIAGNOSIS — R00.2 PALPITATIONS: ICD-10-CM

## 2021-10-28 NOTE — TELEPHONE ENCOUNTER
Called patient to request records for NP appointment with Dr Ocampo via telemed. Called to confirm if this will be first time patient is seeing a cardiologist as well as to confirm las imaging or any other cardiac procedures.. No answer, patient's voice mail full, not able to leave voicemail.

## 2021-11-03 ENCOUNTER — TELEMEDICINE2 (OUTPATIENT)
Dept: CARDIOLOGY | Facility: MEDICAL CENTER | Age: 56
End: 2021-11-03
Payer: COMMERCIAL

## 2021-11-03 VITALS
WEIGHT: 245 LBS | HEART RATE: 93 BPM | DIASTOLIC BLOOD PRESSURE: 82 MMHG | OXYGEN SATURATION: 98 % | HEIGHT: 66 IN | RESPIRATION RATE: 19 BRPM | SYSTOLIC BLOOD PRESSURE: 124 MMHG | BODY MASS INDEX: 39.37 KG/M2

## 2021-11-03 DIAGNOSIS — E78.5 DYSLIPIDEMIA: ICD-10-CM

## 2021-11-03 DIAGNOSIS — R00.2 PALPITATIONS: ICD-10-CM

## 2021-11-03 DIAGNOSIS — E11.9 TYPE 2 DIABETES MELLITUS WITHOUT COMPLICATION, WITH LONG-TERM CURRENT USE OF INSULIN (HCC): ICD-10-CM

## 2021-11-03 DIAGNOSIS — R06.09 DYSPNEA ON EXERTION: ICD-10-CM

## 2021-11-03 DIAGNOSIS — Z79.4 TYPE 2 DIABETES MELLITUS WITHOUT COMPLICATION, WITH LONG-TERM CURRENT USE OF INSULIN (HCC): ICD-10-CM

## 2021-11-03 DIAGNOSIS — I10 ESSENTIAL HYPERTENSION: ICD-10-CM

## 2021-11-03 PROCEDURE — 99214 OFFICE O/P EST MOD 30 MIN: CPT | Mod: 95 | Performed by: INTERNAL MEDICINE

## 2021-11-03 RX ORDER — ATORVASTATIN CALCIUM 40 MG/1
40 TABLET, FILM COATED ORAL
Qty: 90 TABLET | Refills: 3 | Status: SHIPPED | OUTPATIENT
Start: 2021-11-03 | End: 2021-12-03 | Stop reason: SDUPTHER

## 2021-11-03 ASSESSMENT — FIBROSIS 4 INDEX: FIB4 SCORE: 1.45

## 2021-11-03 NOTE — LETTER
[facility], [facility]   [address], [address]  [place] [address]  Phone: [phone]  Fax: [phone]              Jaimee Serrano  1965    Encounter Date: 11/3/2021    Doug Ocampo M.D.          PROGRESS NOTE:      Cardiology Initial Consultation Note    Date of note:    11/3/2021    Primary Care Provider: Luis Eduardo Hamm M.D.  Referring Provider: Luis Eduardo Hamm M.D.     Patient Name: Jaimee Serrano   YOB: 1965  MRN:              [id number]    Chief Complaint: palpitations, ALLISON.    History of Present Illness: Jaimee Serrano is a 56 y.o. female whose current medical problems are included below who is here for cardiac consultation for palpitations and ALLISON.    HPI per oncology notes (Dr. 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 [place] [facility] [place] the care of Dr. Phil Lackey. She had CT imaging in 09/2019 which showed multiple bilateral large lung nodules (RUL 5.2 x 2.5cm, SUV max 2.9; RLL basal nodule up to 2.4cm, SUV max 3.5; LLL dominant nodule up to 3.5cm, SUV max 2.4) on a background of large pulmonary bullae. Prior to this she states she also had a CT A/P for Crohn's which caught abnormalities at her lung bases.     PET/CT was done on 05/30/19 which demonstrated multiple bilateral pulmonary lesions with low level FDG avidity (dominant nodule w/in the RUL 5.2 x 2.5cm, SUV max 2.9; RLL basal nodule up to 2.4cm, SUV max 3.5; LLL dominant nodule up to 3.5cm, SW max 2.4). On 7/31/19 she underwent CT guided FNA of the BARBARA; pathology was negative for malignant cells, showed few lymphocytes and histiocytes.     On 9/18/19 the patient underwent right lung bronchoscopic biopsy which was negative for metastases. On 11/19/19 she underwent VATS  left lower lobe wedge biopsies which confirmed the diagnosis of a BALT lymphoma. The neoplastic cells expressed CD20, BCL2, and did not express Cyclin Dl, or CD43. Numerous + plasma cells are seen between the follicles and show no light-chain restriction; HHV8 and DICKSON negative.    On 9/28/2019 she underwent total abdominal hysterectomy, partial vaginectomy with para-aortic/pelvio lymph node biopsy with removal ovaries/tubes and sentinel node Injection procedure. Pathology showed grade 2 endometrioid carcinoma and isolated tumor cells (around 30 cells) was present in 1 of 1 lymph node.    She was started on Carbo/Taxol for endometrial adenocarcinoma, with the addition of rituximab for BALT lymphoma. She completed 6 cycles from 12/11/19 to 3/27/20. Post-treatment CT on 4/10/20 showed significant decreased in the ill-defined opacities in the LLL and RLL. Since completing therapy she has been on surveillance and followed by both medical oncology/hematology and gyn onc. She moved from [place] to [place] last year, and [facility]. She reports last having CT imaging in February 2021.     Regarding her Crohn's disease, she was diagnosed at age 31, and has been off of all therapy for the past eight years. She had been on various different medications until that point, and reports multiple side effects and liver injury. She reports her disease is under better control now. She was also diagnosed with hypothyroidism in her 20s.     She recently relocated to the [place] area for work. Her main symptom currently is fatigue and dyspnea with exertion. She has noticed decreased exercise tolerance since starting chemotherapy, which remains unchanged. She also endorses infrequent mild cough. She denies any fevers, drenching night sweats, weight loss, dyspnea at rest, abdominal pain, n/v/d, or other symptoms. She recently established oncologic care with Dr. Yohan Trejo at [facility], and also saw someone in  "gynecologic oncology for endometrial cancer surveillance.        Cardiac related history:    She reports once every couple weeks she experiences some mild to moderate severity substernal palpitations like a \"glug glug\" that lasts about 1 minute and then resolves. Not exertional.     She also feels worsening fatigue, and ALLISON. She gets exhausted with almost any activity including grocery shopping and then she needs to rest.       ROS    All other systems reviewed and discussed using a comprehensive questionnaire which has been scanned into [employer] as part of this appointment.          Past Medical History:   Diagnosis Date   • Acquired hypothyroidism 6/14/2021   • Essential hypertension 6/14/2021   • IBD (inflammatory bowel disease) 6/14/2021    Chron's   • MALT lymphoma (HCC) 6/14/2021   • Pulmonary nodules 6/14/2021   • Type 2 diabetes mellitus without complication, with long-term current use of insulin (HCC) 6/14/2021         Past Surgical History:   Procedure Laterality Date   • ABDOMINAL HYSTERECTOMY TOTAL      NADER + SBO         Current Outpatient Medications   Medication Sig Dispense Refill   • HUMALOG KWIKPEN 100 UNIT/ML Solution Pen-injector injection PEN INJECT 10 UNITS UNDER THE SKIN THREE TIMES A DAY BEFORE MEALS 15 mL 3   • meloxicam (MOBIC) 7.5 MG Tab Take 1 tablet by mouth every day. 30 tablet 1   • metformin (GLUCOPHAGE) 1000 MG tablet Take 1,000 mg by mouth 2 times a day with meals.     • levothyroxine (SYNTHROID) 100 MCG Tab Take 100 mcg by mouth every morning on an empty stomach.     • Dulaglutide (TRULICITY) 1.5 MG/0.5ML Solution Pen-injector Inject 0.5 mL under the skin every 7 days. 45 mL 1   • Insulin Degludec (TRESIBA FLEXTOUCH) 100 UNIT/ML Solution Pen-injector Inject 20 Units under the skin every morning. 1800 mL 1   • pantoprazole (PROTONIX) 20 MG tablet Take 1 tablet by mouth every day. 90 tablet 1   • ramipril (ALTACE) 2.5 MG Cap Take 1 capsule by mouth every day. 90 capsule 1   • simvastatin " "(ZOCOR) 10 MG Tab Take 1 tablet by mouth every evening. 90 tablet 1     No current facility-administered medications for this visit.         No Known Allergies      Family History   Problem Relation Age of Onset   • No Known Problems Other          Social History     Socioeconomic History   • Marital status: Single     Spouse name: Not on file   • Number of children: Not on file   • Years of education: Not on file   • Highest education level: Not on file   Occupational History   • Not on file   Tobacco Use   • Smoking status: Never Smoker   • Smokeless tobacco: Never Used   Vaping Use   • Vaping Use: Never used   Substance and Sexual Activity   • Alcohol use: Yes     Comment: 2 times a year   • Drug use: Not Currently   • Sexual activity: Not on file   Other Topics Concern   • Not on file   Social History Narrative   • Not on file     Social Determinants of Health     Financial Resource Strain:    • Difficulty of Paying Living Expenses:    Food Insecurity:    • Worried About Running Out of Food in the Last Year:    • Ran Out of Food in the Last Year:    Transportation Needs:    • Lack of Transportation (Medical):    • Lack of Transportation (Non-Medical):    Physical Activity:    • Days of Exercise per Week:    • Minutes of Exercise per Session:    Stress:    • Feeling of Stress :    Social Connections:    • Frequency of Communication with Friends and Family:    • Frequency of Social Gatherings with Friends and Family:    • Attends Congregational Services:    • Active Member of Clubs or Organizations:    • Attends Club or Organization Meetings:    • Marital Status:    Intimate Partner Violence:    • Fear of Current or Ex-Partner:    • Emotionally Abused:    • Physically Abused:    • Sexually Abused:          Physical Exam:  Ambulatory Vitals  /82 (BP Location: Left arm, Patient Position: Sitting, BP Cuff Size: Adult)   Pulse 93   Resp 19   Ht 1.676 m (5' 6\")   Wt 111 kg (245 lb)   SpO2 98%    Oxygen Therapy:  " Pulse Oximetry: 98 %  BP Readings from Last 4 Encounters:   11/03/21 124/82   10/13/21 138/72   09/19/21 122/80   09/08/21 122/74       Weight/BMI: Body mass index is 39.54 kg/m².  Wt Readings from Last 4 Encounters:   11/03/21 111 kg (245 lb)   10/13/21 112 kg (248 lb)   09/19/21 112 kg (248 lb)   09/08/21 112 kg (247 lb)     General: No apparent distress  Eyes: nl conjunctiva  ENT: OP covered by mask.   Neck: JVP 4 cm H2O, no carotid bruits  Lungs: normal respiratory effort, CTAB  Heart: RRR, distant heart sounds, no audible murmurs, no rubs or gallops, 1+ edema bilateral lower extremities. No LV/RV heave on cardiac palpatation. 2+ bilateral radial pulses.    Abdomen: soft, non tender, + distended hernia noted, no masses, normal bowel sounds.  No HSM.  Extremities/MSK: no clubbing, no cyanosis  Neurological: No focal sensory deficits  Psychiatric: Appropriate affect, A/O x 3, intact judgement and insight  Skin: Warm extremities      Lab Data Review:  Lab Results   Component Value Date/Time    CHOLSTRLTOT 187 06/18/2021 12:01 PM     (H) 06/18/2021 12:01 PM    HDL 49 06/18/2021 12:01 PM    TRIGLYCERIDE 181 (H) 06/18/2021 12:01 PM       Lab Results   Component Value Date/Time    SODIUM 137 10/12/2021 09:46 AM    POTASSIUM 4.0 10/12/2021 09:46 AM    CHLORIDE 101 10/12/2021 09:46 AM    CO2 25 10/12/2021 09:46 AM    GLUCOSE 125 (H) 10/12/2021 09:46 AM    BUN 20 10/12/2021 09:46 AM    CREATININE 0.70 10/12/2021 09:46 AM     Lab Results   Component Value Date/Time    ALKPHOSPHAT 88 10/12/2021 09:46 AM    ASTSGOT 29 10/12/2021 09:46 AM    ALTSGPT 34 10/12/2021 09:46 AM    TBILIRUBIN 0.4 10/12/2021 09:46 AM      Lab Results   Component Value Date/Time    WBC 6.4 10/12/2021 09:46 AM     No components found for: HBGA1C  No components found for: TROPONIN  No results found for: BNP      Cardiac Imaging and Procedures Review:    EKG dated 11/3/2021 : My personal interpretation is NSR, poor r wave progression.        Medical Decision Making:    Per onc notes:  Jaimee Serrano is a 56 year old woman with recent history of endometrial adenocarcinoma and incidentally found BALT lymphoma on staging imaging, s/p total abdominal hysterectomy, partial vaginectomy with para-aortic/pelvio lymph node biopsy and salpingoophercetomy, s/p 6 cycles of carbo/taxol with the addition of rituximab for the BALT, completed 03/27/20. Repeat CT Chest following completion of therapy in 04/2020 showed significant improvement in lungs. She has been under surveillance for both diagnoses since then.     She has stable symptoms of dyspnea on exertion and decreased exercise tolerance, however fatigue is now worsening. PET concerning for disease recurrence in the lung. An FNA of a lymph node showed lymphocytes suspicious for B-cell lymphoma; CD20+, CD5-, CD10-, Ki67 ~5%, with negative B-cell gene rearrangement. A LN core biopsy showed clotted blood with foci of B-lymphocytes, many CD20-positive, mix of B and T cells. We discussed that this is concerning for recurrent disease. We also discussed additional treatment options, including repeat rituximab (weekly), rituximab plus chemotherapy such as bendamustine, BTK inhibitor such as ibrutinib, or clinical trial including [facility]#282, a phase I study of CC-486 with lenalidomide and obinutuzumab for patients with previously treated indolent lymphoma including MALT lymphoma (requires 2 or more prior lines of therapy).     # MALT Lymphoma of Lung - concern for recurrence given recent FNA LN pathology  - PET concerning for recurrent disease in lung. Results from Bronch also support diagnosis of MZL  - PFTs show DLCO 81%  - Plan for weekly rituximab x 4 (locally if possible, if not patient can travel here). If no/minimal improvement, consider [facility]#282 vs BTKi or other systemic therapy    # Endometrial Carcinoma  - Follow up with gyn onc, patient to establish care with Dr. García here at [facility]    # Palpitations -  resolved since last visit   - has cardiology appmt 11/03  - Advised she present to the ED with worsening chest pain, palpitations, or dyspnea        Cardiology notes:  # ALLISON/SOB - likely pulmonary disease from her therapies, PFTs show only mildly reduced DLCO.   -check echocardiogram  -CT reviewed and showed no CAC, however this was a contrast, non-gated study. If continued symptoms and no clear pulmonary cause, would consider nuclear pharmacological stress testing as given inflammation from cancer, chemo, and her diabetes, she is certainly higher risk for obstructive CAD.     # Palpitations - resolved.  -CTM. Keep electrolytes WNL.     # Dyslipidemia - lipitor given her poorly controlled diabetes (this was when she ran out of meds, but still certainly high dose statin is indicated). Stop simvastatin.       No follow-ups on file.    Deconditioning.     Doug Ocampo MD, [facility]  [facility], [address]  [address] [address]  Phone: [phone]  Fax: [phone]                  Luis Eduardo Hamm M.D.  [address] [place] [id number]  Via In Basket

## 2021-11-03 NOTE — PATIENT INSTRUCTIONS
Please schedule your echocardiogram.     Please stop simvastatin and start lipitor (also called atorvastatin) 40mg once a day.

## 2021-11-03 NOTE — PROGRESS NOTES
Cardiology Initial Consultation Note    Date of note:    11/3/2021    Primary Care Provider: Luis Eduardo Hamm M.D.  Referring Provider: Luis Eduardo Hamm M.D.     Patient Name: Jaimee Serrano   YOB: 1965  MRN:              [id number]    Chief Complaint: palpitations, ALLISON.    History of Present Illness: Jaimee Serrano is a 56 y.o. female whose current medical problems are included below who is here for cardiac consultation for palpitations and ALLISON.    HPI per oncology notes (Dr. 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 Her oncologic care was at [facility] in [place] under the care of Dr. Phil Lackey. She had CT imaging in 09/2019 which showed multiple bilateral large lung nodules (RUL 5.2 x 2.5cm, SUV max 2.9; RLL basal nodule up to 2.4cm, SUV max 3.5; LLL dominant nodule up to 3.5cm, SUV max 2.4) on a background of large pulmonary bullae. Prior to this she states she also had a CT A/P for Crohn's which caught abnormalities at her lung bases.     PET/CT was done on 05/30/19 which demonstrated multiple bilateral pulmonary lesions with low level FDG avidity (dominant nodule w/in the RUL 5.2 x 2.5cm, SUV max 2.9; RLL basal nodule up to 2.4cm, SUV max 3.5; LLL dominant nodule up to 3.5cm, SW max 2.4). On 7/31/19 she underwent CT guided FNA of the BARBARA; pathology was negative for malignant cells, showed few lymphocytes and histiocytes.     On 9/18/19 the patient underwent right lung bronchoscopic biopsy which was negative for metastases. On 11/19/19 she underwent VATS left lower lobe wedge biopsies which confirmed the diagnosis of a BALT lymphoma. The neoplastic cells expressed CD20, BCL2, and did not express Cyclin Dl, or CD43. Numerous + plasma cells are seen between the follicles and show no light-chain restriction; HHV8  "and DICKSON negative.    On 9/28/2019 she underwent total abdominal hysterectomy, partial vaginectomy with para-aortic/pelvio lymph node biopsy with removal ovaries/tubes and sentinel node Injection procedure. Pathology showed grade 2 endometrioid carcinoma and isolated tumor cells (around 30 cells) was present in 1 of 1 lymph node.    She was started on Carbo/Taxol for endometrial adenocarcinoma, with the addition of rituximab for BALT lymphoma. She completed 6 cycles from 12/11/19 to 3/27/20. Post-treatment CT on 4/10/20 showed significant decreased in the ill-defined opacities in the LLL and RLL. Since completing therapy she has been on surveillance and followed by both medical oncology/hematology and gyn onc. She moved from [place] to [place] last year, and was seen at [facility]. She reports last having CT imaging in February 2021.     Regarding her Crohn's disease, she was diagnosed at age 31, and has been off of all therapy for the past eight years. She had been on various different medications until that point, and reports multiple side effects and liver injury. She reports her disease is under better control now. She was also diagnosed with hypothyroidism in her 20s.     She recently relocated to the [facility] for work. Her main symptom currently is fatigue and dyspnea with exertion. She has noticed decreased exercise tolerance since starting chemotherapy, which remains unchanged. She also endorses infrequent mild cough. She denies any fevers, drenching night sweats, weight loss, dyspnea at rest, abdominal pain, n/v/d, or other symptoms. She recently established oncologic care with Dr. Yohan Trejo at [facility], and also saw someone in gynecologic oncology for endometrial cancer surveillance.        Cardiac related history:    She reports once every couple weeks she experiences some mild to moderate severity substernal palpitations like a \"glug glug\" that lasts about 1 minute and then resolves. Not " exertional.     She also feels worsening fatigue, and ALLISON. She gets exhausted with almost any activity including grocery shopping and then she needs to rest.       ROS    All other systems reviewed and discussed using a comprehensive questionnaire which has been scanned into [employer] as part of this appointment.          Past Medical History:   Diagnosis Date   • Acquired hypothyroidism 6/14/2021   • Essential hypertension 6/14/2021   • IBD (inflammatory bowel disease) 6/14/2021    Chron's   • MALT lymphoma (HCC) 6/14/2021   • Pulmonary nodules 6/14/2021   • Type 2 diabetes mellitus without complication, with long-term current use of insulin (HCC) 6/14/2021         Past Surgical History:   Procedure Laterality Date   • ABDOMINAL HYSTERECTOMY TOTAL      NADER + SBO         Current Outpatient Medications   Medication Sig Dispense Refill   • HUMALOG KWIKPEN 100 UNIT/ML Solution Pen-injector injection PEN INJECT 10 UNITS UNDER THE SKIN THREE TIMES A DAY BEFORE MEALS 15 mL 3   • meloxicam (MOBIC) 7.5 MG Tab Take 1 tablet by mouth every day. 30 tablet 1   • metformin (GLUCOPHAGE) 1000 MG tablet Take 1,000 mg by mouth 2 times a day with meals.     • levothyroxine (SYNTHROID) 100 MCG Tab Take 100 mcg by mouth every morning on an empty stomach.     • Dulaglutide (TRULICITY) 1.5 MG/0.5ML Solution Pen-injector Inject 0.5 mL under the skin every 7 days. 45 mL 1   • Insulin Degludec (TRESIBA FLEXTOUCH) 100 UNIT/ML Solution Pen-injector Inject 20 Units under the skin every morning. 1800 mL 1   • pantoprazole (PROTONIX) 20 MG tablet Take 1 tablet by mouth every day. 90 tablet 1   • ramipril (ALTACE) 2.5 MG Cap Take 1 capsule by mouth every day. 90 capsule 1   • simvastatin (ZOCOR) 10 MG Tab Take 1 tablet by mouth every evening. 90 tablet 1     No current facility-administered medications for this visit.         No Known Allergies      Family History   Problem Relation Age of Onset   • No Known Problems Other          Social History  "    Socioeconomic History   • Marital status: Single     Spouse name: Not on file   • Number of children: Not on file   • Years of education: Not on file   • Highest education level: Not on file   Occupational History   • Not on file   Tobacco Use   • Smoking status: Never Smoker   • Smokeless tobacco: Never Used   Vaping Use   • Vaping Use: Never used   Substance and Sexual Activity   • Alcohol use: Yes     Comment: 2 times a year   • Drug use: Not Currently   • Sexual activity: Not on file   Other Topics Concern   • Not on file   Social History Narrative   • Not on file     Social Determinants of Health     Financial Resource Strain:    • Difficulty of Paying Living Expenses:    Food Insecurity:    • Worried About Running Out of Food in the Last Year:    • Ran Out of Food in the Last Year:    Transportation Needs:    • Lack of Transportation (Medical):    • Lack of Transportation (Non-Medical):    Physical Activity:    • Days of Exercise per Week:    • Minutes of Exercise per Session:    Stress:    • Feeling of Stress :    Social Connections:    • Frequency of Communication with Friends and Family:    • Frequency of Social Gatherings with Friends and Family:    • Attends [employer] Services:    • Active Member of Clubs or Organizations:    • Attends Club or Organization Meetings:    • Marital Status:    Intimate Partner Violence:    • Fear of Current or Ex-Partner:    • Emotionally Abused:    • Physically Abused:    • Sexually Abused:          Physical Exam:  Ambulatory Vitals  /82 (BP Location: Left arm, Patient Position: Sitting, BP Cuff Size: Adult)   Pulse 93   Resp 19   Ht 1.676 m (5' 6\")   Wt 111 kg (245 lb)   SpO2 98%    Oxygen Therapy:  Pulse Oximetry: 98 %  BP Readings from Last 4 Encounters:   11/03/21 124/82   10/13/21 138/72   09/19/21 122/80   09/08/21 122/74       Weight/BMI: Body mass index is 39.54 kg/m².  Wt Readings from Last 4 Encounters:   11/03/21 111 kg (245 lb)   10/13/21 112 kg " (248 lb)   09/19/21 112 kg (248 lb)   09/08/21 112 kg (247 lb)     General: No apparent distress  Eyes: nl conjunctiva  ENT: OP covered by mask.   Neck: JVP 4 cm H2O, no carotid bruits  Lungs: normal respiratory effort, CTAB  Heart: RRR, distant heart sounds, no audible murmurs, no rubs or gallops, 1+ edema bilateral lower extremities. No LV/RV heave on cardiac palpatation. 2+ bilateral radial pulses.    Abdomen: soft, non tender, + distended hernia noted, no masses, normal bowel sounds.  No HSM.  Extremities/MSK: no clubbing, no cyanosis  Neurological: No focal sensory deficits  Psychiatric: Appropriate affect, A/O x 3, intact judgement and insight  Skin: Warm extremities      Lab Data Review:  Lab Results   Component Value Date/Time    CHOLSTRLTOT 187 06/18/2021 12:01 PM     (H) 06/18/2021 12:01 PM    HDL 49 06/18/2021 12:01 PM    TRIGLYCERIDE 181 (H) 06/18/2021 12:01 PM       Lab Results   Component Value Date/Time    SODIUM 137 10/12/2021 09:46 AM    POTASSIUM 4.0 10/12/2021 09:46 AM    CHLORIDE 101 10/12/2021 09:46 AM    CO2 25 10/12/2021 09:46 AM    GLUCOSE 125 (H) 10/12/2021 09:46 AM    BUN 20 10/12/2021 09:46 AM    CREATININE 0.70 10/12/2021 09:46 AM     Lab Results   Component Value Date/Time    ALKPHOSPHAT 88 10/12/2021 09:46 AM    ASTSGOT 29 10/12/2021 09:46 AM    ALTSGPT 34 10/12/2021 09:46 AM    TBILIRUBIN 0.4 10/12/2021 09:46 AM      Lab Results   Component Value Date/Time    WBC 6.4 10/12/2021 09:46 AM     No components found for: HBGA1C  No components found for: TROPONIN  No results found for: BNP      Cardiac Imaging and Procedures Review:    EKG dated 11/3/2021 : My personal interpretation is NSR, poor r wave progression.       Medical Decision Making:    Per onc notes:  Jaimee Serrano is a 56 year old woman with recent history of endometrial adenocarcinoma and incidentally found BALT lymphoma on staging imaging, s/p total abdominal hysterectomy, partial vaginectomy with para-aortic/pelvio  lymph node biopsy and salpingoophercetomy, s/p 6 cycles of carbo/taxol with the addition of rituximab for the BALT, completed 03/27/20. Repeat CT Chest following completion of therapy in 04/2020 showed significant improvement in lungs. She has been under surveillance for both diagnoses since then.     She has stable symptoms of dyspnea on exertion and decreased exercise tolerance, however fatigue is now worsening. PET concerning for disease recurrence in the lung. An FNA of a lymph node showed lymphocytes suspicious for B-cell lymphoma; CD20+, CD5-, CD10-, Ki67 ~5%, with negative B-cell gene rearrangement. A LN core biopsy showed clotted blood with foci of B-lymphocytes, many CD20-positive, mix of B and T cells. We discussed that this is concerning for recurrent disease. We also discussed additional treatment options, including repeat rituximab (weekly), rituximab plus chemotherapy such as bendamustine, BTK inhibitor such as ibrutinib, or clinical trial including [facility]#282, a phase I study of CC-486 with lenalidomide and obinutuzumab for patients with previously treated indolent lymphoma including MALT lymphoma (requires 2 or more prior lines of therapy).     # MALT Lymphoma of Lung - concern for recurrence given recent FNA LN pathology  - PET concerning for recurrent disease in lung. Results from Bronch also support diagnosis of MZL  - PFTs show DLCO 81%  - Plan for weekly rituximab x 4 (locally if possible, if not patient can travel here). If no/minimal improvement, consider [facility]#282 vs BTKi or other systemic therapy    # Endometrial Carcinoma  - Follow up with gyn onc, patient to establish care with Dr. García here at [facility]    # Palpitations - resolved since last visit   - has cardiology appmt 11/03  - Advised she present to the ED with worsening chest pain, palpitations, or dyspnea        Cardiology notes:  # ALLISON/SOB - likely pulmonary disease from her therapies, PFTs show only mildly reduced DLCO.   -check  echocardiogram  -CT reviewed and showed no CAC, however this was a contrast, non-gated study. If continued symptoms and no clear pulmonary cause, would consider nuclear pharmacological stress testing as given inflammation from cancer, chemo, and her diabetes, she is certainly higher risk for obstructive CAD.   -could also be a component of deconditioning, if echo stable as well as stress testing, would need PT and assistance to regain exertional capabilities.     # Palpitations - resolved.  -CTM. Keep electrolytes WNL.     # Dyslipidemia - lipitor given her poorly controlled diabetes (this was when she ran out of meds, but still certainly high dose statin is indicated). Stop simvastatin.       Return in about 6 months (around 5/3/2022).    Doug Ocampo MD, [facility]  [facility], [address]  [address] [address]  Phone: [phone]  Fax: [phone]

## 2021-11-04 LAB — EKG IMPRESSION: NORMAL

## 2021-11-04 PROCEDURE — 93000 ELECTROCARDIOGRAM COMPLETE: CPT | Performed by: INTERNAL MEDICINE

## 2021-11-05 ENCOUNTER — HOSPITAL ENCOUNTER (OUTPATIENT)
Dept: LAB | Facility: MEDICAL CENTER | Age: 56
End: 2021-11-05
Attending: INTERNAL MEDICINE
Payer: COMMERCIAL

## 2021-11-05 LAB
ALBUMIN SERPL BCP-MCNC: 3.5 G/DL (ref 3.2–4.9)
ALBUMIN/GLOB SERPL: 0.9 G/DL
ALP SERPL-CCNC: 96 U/L (ref 30–99)
ALT SERPL-CCNC: 40 U/L (ref 2–50)
ANION GAP SERPL CALC-SCNC: 10 MMOL/L (ref 7–16)
AST SERPL-CCNC: 39 U/L (ref 12–45)
BASOPHILS # BLD AUTO: 0.9 % (ref 0–1.8)
BASOPHILS # BLD: 0.06 K/UL (ref 0–0.12)
BILIRUB SERPL-MCNC: 0.3 MG/DL (ref 0.1–1.5)
BUN SERPL-MCNC: 17 MG/DL (ref 8–22)
CALCIUM SERPL-MCNC: 9 MG/DL (ref 8.5–10.5)
CHLORIDE SERPL-SCNC: 103 MMOL/L (ref 96–112)
CO2 SERPL-SCNC: 25 MMOL/L (ref 20–33)
CREAT SERPL-MCNC: 0.68 MG/DL (ref 0.5–1.4)
EOSINOPHIL # BLD AUTO: 0.1 K/UL (ref 0–0.51)
EOSINOPHIL NFR BLD: 1.5 % (ref 0–6.9)
ERYTHROCYTE [DISTWIDTH] IN BLOOD BY AUTOMATED COUNT: 44.4 FL (ref 35.9–50)
GLOBULIN SER CALC-MCNC: 4 G/DL (ref 1.9–3.5)
GLUCOSE SERPL-MCNC: 250 MG/DL (ref 65–99)
HCT VFR BLD AUTO: 40.9 % (ref 37–47)
HGB BLD-MCNC: 12.8 G/DL (ref 12–16)
IMM GRANULOCYTES # BLD AUTO: 0.02 K/UL (ref 0–0.11)
IMM GRANULOCYTES NFR BLD AUTO: 0.3 % (ref 0–0.9)
LYMPHOCYTES # BLD AUTO: 1.94 K/UL (ref 1–4.8)
LYMPHOCYTES NFR BLD: 29.3 % (ref 22–41)
MCH RBC QN AUTO: 25.9 PG (ref 27–33)
MCHC RBC AUTO-ENTMCNC: 31.3 G/DL (ref 33.6–35)
MCV RBC AUTO: 82.6 FL (ref 81.4–97.8)
MONOCYTES # BLD AUTO: 0.66 K/UL (ref 0–0.85)
MONOCYTES NFR BLD AUTO: 10 % (ref 0–13.4)
NEUTROPHILS # BLD AUTO: 3.84 K/UL (ref 2–7.15)
NEUTROPHILS NFR BLD: 58 % (ref 44–72)
NRBC # BLD AUTO: 0 K/UL
NRBC BLD-RTO: 0 /100 WBC
PLATELET # BLD AUTO: 196 K/UL (ref 164–446)
PMV BLD AUTO: 11.2 FL (ref 9–12.9)
POTASSIUM SERPL-SCNC: 4.5 MMOL/L (ref 3.6–5.5)
PROT SERPL-MCNC: 7.5 G/DL (ref 6–8.2)
RBC # BLD AUTO: 4.95 M/UL (ref 4.2–5.4)
SODIUM SERPL-SCNC: 138 MMOL/L (ref 135–145)
WBC # BLD AUTO: 6.6 K/UL (ref 4.8–10.8)

## 2021-11-05 PROCEDURE — 85025 COMPLETE CBC W/AUTO DIFF WBC: CPT

## 2021-11-05 PROCEDURE — 80053 COMPREHEN METABOLIC PANEL: CPT

## 2021-11-05 PROCEDURE — 87449 NOS EACH ORGANISM AG IA: CPT

## 2021-11-05 PROCEDURE — 87305 ASPERGILLUS AG IA: CPT

## 2021-11-05 PROCEDURE — 36415 COLL VENOUS BLD VENIPUNCTURE: CPT

## 2021-11-07 LAB
1 3 BETA D GLUCAN INTERP Q4483: NEGATIVE
1,3 BETA GLUCAN SER-MCNC: 31 PG/ML
GALACTOMANNAN AG SERPL QL IA: NEGATIVE
GALACTOMANNAN AG SERPL-ACNC: 0.1

## 2021-11-15 ENCOUNTER — HOSPITAL ENCOUNTER (OUTPATIENT)
Facility: MEDICAL CENTER | Age: 56
End: 2021-11-15
Attending: INTERNAL MEDICINE
Payer: COMMERCIAL

## 2021-11-15 PROCEDURE — 80074 ACUTE HEPATITIS PANEL: CPT

## 2021-11-16 LAB
HAV IGM SERPL QL IA: NORMAL
HBV CORE IGM SER QL: NORMAL
HBV SURFACE AG SER QL: NORMAL
HCV AB SER QL: NORMAL

## 2021-11-23 ENCOUNTER — PATIENT MESSAGE (OUTPATIENT)
Dept: MEDICAL GROUP | Facility: PHYSICIAN GROUP | Age: 56
End: 2021-11-23

## 2021-11-23 DIAGNOSIS — Z79.4 TYPE 2 DIABETES MELLITUS WITHOUT COMPLICATION, WITH LONG-TERM CURRENT USE OF INSULIN (HCC): ICD-10-CM

## 2021-11-23 DIAGNOSIS — E11.9 TYPE 2 DIABETES MELLITUS WITHOUT COMPLICATION, WITH LONG-TERM CURRENT USE OF INSULIN (HCC): ICD-10-CM

## 2021-11-23 RX ORDER — INSULIN LISPRO 100 [IU]/ML
10 INJECTION, SOLUTION INTRAVENOUS; SUBCUTANEOUS
Qty: 3 ML | Refills: 2 | Status: SHIPPED | OUTPATIENT
Start: 2021-11-23 | End: 2022-02-08 | Stop reason: SDUPTHER

## 2021-11-24 NOTE — PROGRESS NOTES
Patient requesting refill of KwikPen, to [facility], as mail-order will be delayed.    Order placed, but cautioned that there might be difficulty with insurance.  Patient notified on MyChart.

## 2021-11-29 ENCOUNTER — HOSPITAL ENCOUNTER (OUTPATIENT)
Dept: CARDIOLOGY | Facility: MEDICAL CENTER | Age: 56
End: 2021-11-29
Attending: STUDENT IN AN ORGANIZED HEALTH CARE EDUCATION/TRAINING PROGRAM
Payer: COMMERCIAL

## 2021-11-29 DIAGNOSIS — R53.83 OTHER FATIGUE: ICD-10-CM

## 2021-11-29 DIAGNOSIS — R06.01 ORTHOPNEA: ICD-10-CM

## 2021-11-29 DIAGNOSIS — R06.09 DYSPNEA ON EXERTION: ICD-10-CM

## 2021-11-29 LAB
LV EJECT FRACT  99904: 65
LV EJECT FRACT MOD 2C 99903: 73.04
LV EJECT FRACT MOD 4C 99902: 56.36
LV EJECT FRACT MOD BP 99901: 65.55

## 2021-11-29 PROCEDURE — 93306 TTE W/DOPPLER COMPLETE: CPT | Mod: 26 | Performed by: INTERNAL MEDICINE

## 2021-11-29 PROCEDURE — 93306 TTE W/DOPPLER COMPLETE: CPT

## 2021-12-03 ENCOUNTER — TELEPHONE (OUTPATIENT)
Dept: SCHEDULING | Facility: IMAGING CENTER | Age: 56
End: 2021-12-03

## 2021-12-03 DIAGNOSIS — E78.5 DYSLIPIDEMIA: ICD-10-CM

## 2021-12-03 RX ORDER — ATORVASTATIN CALCIUM 40 MG/1
40 TABLET, FILM COATED ORAL
Qty: 90 TABLET | Refills: 3 | Status: SHIPPED | OUTPATIENT
Start: 2021-12-03 | End: 2022-08-11 | Stop reason: SDUPTHER

## 2021-12-03 NOTE — TELEPHONE ENCOUNTER
ABBY Bermudez with [facility] called in and is needing a refill of the medication atorvastatin (LIPITOR) 40 MG Tab for this pt. Pt is needing a 90 day supply with up to 3 refills if possible.     [facility] contact:    PH: [phone]  Fax: [phone]    Thank you,  Elinor WORLEY

## 2021-12-09 ENCOUNTER — OFFICE VISIT (OUTPATIENT)
Dept: MEDICAL GROUP | Facility: PHYSICIAN GROUP | Age: 56
End: 2021-12-09
Payer: COMMERCIAL

## 2021-12-09 VITALS
OXYGEN SATURATION: 96 % | SYSTOLIC BLOOD PRESSURE: 122 MMHG | HEIGHT: 66 IN | HEART RATE: 84 BPM | BODY MASS INDEX: 40.66 KG/M2 | DIASTOLIC BLOOD PRESSURE: 78 MMHG | TEMPERATURE: 97 F | WEIGHT: 253 LBS

## 2021-12-09 DIAGNOSIS — M67.442 GANGLION CYST OF FINGER OF LEFT HAND: ICD-10-CM

## 2021-12-09 DIAGNOSIS — E11.9 TYPE 2 DIABETES MELLITUS WITHOUT COMPLICATION, WITH LONG-TERM CURRENT USE OF INSULIN (HCC): ICD-10-CM

## 2021-12-09 DIAGNOSIS — Z79.4 TYPE 2 DIABETES MELLITUS WITHOUT COMPLICATION, WITH LONG-TERM CURRENT USE OF INSULIN (HCC): ICD-10-CM

## 2021-12-09 PROCEDURE — 99214 OFFICE O/P EST MOD 30 MIN: CPT | Performed by: STUDENT IN AN ORGANIZED HEALTH CARE EDUCATION/TRAINING PROGRAM

## 2021-12-09 ASSESSMENT — FIBROSIS 4 INDEX: FIB4 SCORE: 1.79

## 2021-12-09 NOTE — ASSESSMENT & PLAN NOTE
Chronic bump on anterior left second (index) finger.  Has been there for about 1 year.  More noticeable and aggravating for 6 months.    She is also left-handed. (And more noticeable/irritating).  She would like this evaluated/treated.  - Will refer to hand surgeon.

## 2021-12-09 NOTE — PROGRESS NOTES
Established Patient     Jaimee Serrano is a 56 y.o. female.    Chief Complaint   Patient presents with   • Diabetes     FV for diabetes             Problems addressed this visit:     This note uses problem-based documentation.  Subjective HPI is included in Assessment & Plan, at bottom of note.   Problem   Ganglion Cyst of Finger of Left Hand   DM-2, Type 2 diabetes mellitus without complication, with long-term current use of insulin (HCC)                           Past Medical History:   Diagnosis Date   • Acquired hypothyroidism 6/14/2021   • Essential hypertension 6/14/2021   • IBD (inflammatory bowel disease) 6/14/2021    Chron's   • MALT lymphoma (HCC) 6/14/2021   • Pulmonary nodules 6/14/2021   • Type 2 diabetes mellitus without complication, with long-term current use of insulin (HCC) 6/14/2021       Patient Active Problem List   Diagnosis   • IBD (inflammatory bowel disease)   • MALT lymphoma (HCC)   • Endometrioid adenocarcinoma of uterus (HCC)   • DM-2, Type 2 diabetes mellitus without complication, with long-term current use of insulin (HCC)   • Acquired hypothyroidism   • Gastroesophageal reflux disease without esophagitis   • Essential hypertension   • Dyslipidemia   • Pulmonary nodules   • Chronic bilateral low back pain without sciatica   • Elevated ALT measurement   • Mediastinal lymphadenopathy   • Hilar lymphadenopathy   • Cancer with pulmonary metastases (HCC) (possible)    • Palpitations   • Other fatigue   • Orthopnea   • Dyspnea on exertion   • Encounter for completion of form with patient   • Abnormal laboratory test   • Ganglion cyst of finger of left hand       Past Surgical History:   Procedure Laterality Date   • GASTRIC BYPASS LAPAROSCOPIC  2016    sleeve   • ABDOMINAL HYSTERECTOMY TOTAL      NADER + SBO   • CHOLECYSTECTOMY         Family History   Problem Relation Age of Onset   • No Known Problems Other    • Heart Disease Mother         rhuematic fever, valve replacement   • Heart  "Failure Father        Social History     Tobacco Use   • Smoking status: Never Smoker   • Smokeless tobacco: Never Used   Substance Use Topics   • Alcohol use: Yes     Comment: 2 times a year       Current medications:  (including new changes):  Current Outpatient Medications   Medication Sig Dispense Refill   • atorvastatin (LIPITOR) 40 MG Tab Take 1 Tablet by mouth at bedtime. 90 Tablet 3   • insulin lispro (HUMALOG KWIKPEN) 100 UNIT/ML Solution Pen-injector injection PEN Inject 10 Units under the skin 3 times a day before meals. 3 mL 2   • ramipril (ALTACE) 2.5 MG Cap TAKE 1 CAPSULE DAILY 90 Capsule 3   • pantoprazole (PROTONIX) 20 MG tablet TAKE 1 TABLET DAILY 90 Tablet 3   • meloxicam (MOBIC) 7.5 MG Tab Take 1 tablet by mouth every day. 30 tablet 1   • metformin (GLUCOPHAGE) 1000 MG tablet Take 1,000 mg by mouth 2 times a day with meals.     • levothyroxine (SYNTHROID) 100 MCG Tab Take 100 mcg by mouth every morning on an empty stomach.     • Dulaglutide (TRULICITY) 1.5 MG/0.5ML Solution Pen-injector Inject 0.5 mL under the skin every 7 days. 45 mL 1   • Insulin Degludec (TRESIBA FLEXTOUCH) 100 UNIT/ML Solution Pen-injector Inject 20 Units under the skin every morning. 1800 mL 1     No current facility-administered medications for this visit.       Allergies as of 12/09/2021   • (No Known Allergies)                   Review of Symptoms   (as noted elsewhere, and .....)   GEN/CONST:   Denies fever, chills,    CARDIO:   Denies chest pain, or edema.    RESP:   Denies shortness of breath, or coughing.  GI:    Denies nausea, vomiting, diarrhea,      PSYCH:  Denies anxiety, depression,           Physical Exam  /78 (BP Location: Left arm, Patient Position: Sitting, BP Cuff Size: Adult long)   Pulse 84   Temp 36.1 °C (97 °F) (Temporal)   Ht 1.676 m (5' 6\")   Wt 115 kg (253 lb)   SpO2 96%   BMI 40.84 kg/m²   General:  Alert and oriented, No apparent distress.  Neck: Trachea midline, no masses, no obvious " thyromegaly.  Lungs: Easy / unlabored breathing, without difficulty.  Good oxygenation.   MSK:  Good general motion of extremities. Normal ambulation.    Psych:  Appears to have normal mood and affect.        Labs:  Recent / Last-Prior labs reviewed.    CBC and CMP and microalbumin.                            Assessment & Plan  (with subjective history and orders):  Of note, the list below is not in a specific nor sortable order.      Problem List Items Addressed This Visit     DM-2, Type 2 diabetes mellitus without complication, with long-term current use of insulin (HCC)     Chronic and ongoing.        But also aggravated by recent steroids         (using for prior reaction to chemo)  DM Meds:    - degludec (Tresiba) 20 units QHS  - Lispro (humalog kwikpen) - increased to 15 QAC.  - Trulicity (dulaglutide 0.5 ml, inj weekly)  - metformin 1000 mg BID     Denies symptoms or concerns.    Semi-Annual Screens:      Last A1c was  9.9 (6/18/2021)  - while OFF several meds.....     ... then had A1c - 10.7, in 9/2021... but not consistent.      ... will need new A1c.   Annual Screens:       Kidney function:  GFR:   Normal   (6/18/2021)     Microalbumin Ratio:   Normal (ratio - 2.6)   (6/18/2021)     Monofilament test:   Normal   (6/24/2021)      Checks feet at home:  Yes.      Retina Scan:  Will get today... 6/24/2021    ....  However, uncertain if it resulted well....   ... will need further follow-up.....     Associated issues:      BP goal <140/<80:  Yes     On ACE/ARB:  Yes       LDL goal < 100:  No (102)...  (6/18/2021)      On Statin:  Yes    ... but hold-off on increasing, given elevated ALT.      PPSV23 given:  Uncertain - she will try to verify records.....     Management / Plan:     - continue with medications as above    (recently refilled, and need regular use).   - Advised to get new retinal scan...  - will get new A1c and labs,     (after being on medications regularly).   - follow-up in 1-2 months, with new  labs   (and bring home glucose readings).          Relevant Orders    Basic Metabolic Panel    HEMOGLOBIN A1C    Ganglion cyst of finger of left hand     Chronic bump on anterior left second (index) finger.  Has been there for about 1 year.  More noticeable and aggravating for 6 months.    She is also left-handed. (And more noticeable/irritating).  She would like this evaluated/treated.  - Will refer to hand surgeon.          Relevant Orders    Referral to Hand Surgery        Of note, the above list is not in a specific nor sortable order.                  Diagnosis Table, with orders:  1. DM-2, Type 2 diabetes mellitus without complication, with long-term current use of insulin ([facility])  Basic Metabolic Panel    HEMOGLOBIN A1C   2. Ganglion cyst of finger of left hand  Referral to Hand Surgery                 Follow-up:  6 weeks  (DM-2, with home readings, and new A1c).               A computerized dictation system may have been used in this note.    Despite review, there may be some errors in spelling or grammar.    Luis Eduardo Hamm M.D.  12/9/2021

## 2021-12-09 NOTE — ASSESSMENT & PLAN NOTE
Chronic and ongoing.        But also aggravated by recent steroids         (using for prior reaction to chemo)  DM Meds:    - degludec (Tresiba) 20 units QHS  - Lispro (humalog kwikpen) - increased to 15 QAC.  - Trulicity (dulaglutide 0.5 ml, inj weekly)  - metformin 1000 mg BID     Denies symptoms or concerns.    Semi-Annual Screens:      Last A1c was  9.9 (6/18/2021)  - while OFF several meds.....     ... then had A1c - 10.7, in 9/2021... but not consistent.      ... will need new A1c.   Annual Screens:       Kidney function:  GFR:   Normal   (6/18/2021)     Microalbumin Ratio:   Normal (ratio - 2.6)   (6/18/2021)     Monofilament test:   Normal   (6/24/2021)      Checks feet at home:  Yes.      Retina Scan:  Will get today... 6/24/2021    ....  However, uncertain if it resulted well....   ... will need further follow-up.....     Associated issues:      BP goal <140/<80:  Yes     On ACE/ARB:  Yes       LDL goal < 100:  No (102)...  (6/18/2021)      On Statin:  Yes    ... but hold-off on increasing, given elevated ALT.      PPSV23 given:  Uncertain - she will try to verify records.....     Management / Plan:     - continue with medications as above    (recently refilled, and need regular use).   - Advised to get new retinal scan...  - will get new A1c and labs,     (after being on medications regularly).   - follow-up in 1-2 months, with new labs   (and bring home glucose readings).

## 2021-12-09 NOTE — PATIENT INSTRUCTIONS
Please get the labs in about 1 month.  Please get the labs done at least a week prior to your next visit.      Please get them done while fasting   (no food, coffee, or juices, for 8 hours - but water is ok).       Please follow-up with the referral(s) to HAND SURGERY for the ganglion cyst.....  You will likely receive a phone call or [employer] message, with information about what office to contact, in order to schedule.  However, if you do not hear from them in the next week or two, please call [phone], and ask for the referral line, to find out the status of the referral.

## 2021-12-17 ENCOUNTER — HOSPITAL ENCOUNTER (OUTPATIENT)
Dept: RADIOLOGY | Facility: MEDICAL CENTER | Age: 56
End: 2021-12-17
Attending: INTERNAL MEDICINE
Payer: COMMERCIAL

## 2021-12-17 DIAGNOSIS — C88.4 MALT LYMPHOMA (HCC): ICD-10-CM

## 2021-12-17 PROCEDURE — 700117 HCHG RX CONTRAST REV CODE 255: Performed by: INTERNAL MEDICINE

## 2021-12-17 PROCEDURE — 36598 INJ W/FLUOR EVAL CV DEVICE: CPT

## 2021-12-17 RX ORDER — HEPARIN SODIUM (PORCINE) LOCK FLUSH IV SOLN 100 UNIT/ML 100 UNIT/ML
SOLUTION INTRAVENOUS
Status: DISCONTINUED
Start: 2021-12-17 | End: 2021-12-18 | Stop reason: HOSPADM

## 2021-12-17 RX ADMIN — IOHEXOL 16 ML: 300 INJECTION, SOLUTION INTRAVENOUS at 17:15

## 2021-12-18 NOTE — PROGRESS NOTES
Port Check done by Dr Cutler     Consent signed  Time out called at 4:30pm  Procedure End at 4:40pm    5mL heparin 100units/mL used to lock port.  Port de-accessed in a sterile fashion, clean dressing applied with gauze and tegaderm

## 2022-01-19 PROBLEM — M72.0 DUPUYTREN'S CONTRACTURE OF LEFT HAND: Status: ACTIVE | Noted: 2022-01-19

## 2022-02-04 ENCOUNTER — HOSPITAL ENCOUNTER (OUTPATIENT)
Dept: LAB | Facility: MEDICAL CENTER | Age: 57
End: 2022-02-04
Attending: STUDENT IN AN ORGANIZED HEALTH CARE EDUCATION/TRAINING PROGRAM
Payer: COMMERCIAL

## 2022-02-04 DIAGNOSIS — Z79.4 TYPE 2 DIABETES MELLITUS WITHOUT COMPLICATION, WITH LONG-TERM CURRENT USE OF INSULIN (HCC): ICD-10-CM

## 2022-02-04 DIAGNOSIS — E11.9 TYPE 2 DIABETES MELLITUS WITHOUT COMPLICATION, WITH LONG-TERM CURRENT USE OF INSULIN (HCC): ICD-10-CM

## 2022-02-04 LAB
ANION GAP SERPL CALC-SCNC: 12 MMOL/L (ref 7–16)
BUN SERPL-MCNC: 19 MG/DL (ref 8–22)
CALCIUM SERPL-MCNC: 9.3 MG/DL (ref 8.5–10.5)
CHLORIDE SERPL-SCNC: 99 MMOL/L (ref 96–112)
CO2 SERPL-SCNC: 26 MMOL/L (ref 20–33)
CREAT SERPL-MCNC: 0.67 MG/DL (ref 0.5–1.4)
GLUCOSE SERPL-MCNC: 166 MG/DL (ref 65–99)
POTASSIUM SERPL-SCNC: 4.6 MMOL/L (ref 3.6–5.5)
SODIUM SERPL-SCNC: 137 MMOL/L (ref 135–145)

## 2022-02-04 PROCEDURE — 36415 COLL VENOUS BLD VENIPUNCTURE: CPT

## 2022-02-04 PROCEDURE — 80048 BASIC METABOLIC PNL TOTAL CA: CPT

## 2022-02-04 PROCEDURE — 83036 HEMOGLOBIN GLYCOSYLATED A1C: CPT

## 2022-02-05 LAB
EST. AVERAGE GLUCOSE BLD GHB EST-MCNC: 235 MG/DL
HBA1C MFR BLD: 9.8 % (ref 4–5.6)

## 2022-02-08 ENCOUNTER — OFFICE VISIT (OUTPATIENT)
Dept: MEDICAL GROUP | Facility: PHYSICIAN GROUP | Age: 57
End: 2022-02-08
Payer: COMMERCIAL

## 2022-02-08 VITALS
DIASTOLIC BLOOD PRESSURE: 70 MMHG | WEIGHT: 252 LBS | HEIGHT: 66 IN | TEMPERATURE: 97.2 F | OXYGEN SATURATION: 96 % | HEART RATE: 95 BPM | SYSTOLIC BLOOD PRESSURE: 122 MMHG | BODY MASS INDEX: 40.5 KG/M2

## 2022-02-08 DIAGNOSIS — E11.9 TYPE 2 DIABETES MELLITUS WITHOUT COMPLICATION, WITH LONG-TERM CURRENT USE OF INSULIN (HCC): ICD-10-CM

## 2022-02-08 DIAGNOSIS — Z79.4 TYPE 2 DIABETES MELLITUS WITHOUT COMPLICATION, WITH LONG-TERM CURRENT USE OF INSULIN (HCC): ICD-10-CM

## 2022-02-08 DIAGNOSIS — E11.65 UNCONTROLLED TYPE 2 DIABETES MELLITUS WITH HYPERGLYCEMIA (HCC): ICD-10-CM

## 2022-02-08 PROCEDURE — 99214 OFFICE O/P EST MOD 30 MIN: CPT | Performed by: STUDENT IN AN ORGANIZED HEALTH CARE EDUCATION/TRAINING PROGRAM

## 2022-02-08 RX ORDER — INSULIN LISPRO 100 [IU]/ML
10 INJECTION, SOLUTION INTRAVENOUS; SUBCUTANEOUS
Qty: 3 ML | Refills: 0 | Status: CANCELLED | OUTPATIENT
Start: 2022-02-08

## 2022-02-08 RX ORDER — INSULIN LISPRO 100 [IU]/ML
6-14 INJECTION, SOLUTION INTRAVENOUS; SUBCUTANEOUS
Qty: 42 ML | Refills: 1 | Status: SHIPPED | OUTPATIENT
Start: 2022-02-08 | End: 2022-02-11 | Stop reason: SDUPTHER

## 2022-02-08 ASSESSMENT — PATIENT HEALTH QUESTIONNAIRE - PHQ9: CLINICAL INTERPRETATION OF PHQ2 SCORE: 0

## 2022-02-08 ASSESSMENT — FIBROSIS 4 INDEX: FIB4 SCORE: 1.79

## 2022-02-08 NOTE — TELEPHONE ENCOUNTER
Received request via: Pharmacy    Was the patient seen in the last year in this department? Yes    Does the patient have an active prescription (recently filled or refills available) for medication(s) requested? No     PATIENT WILL BE SEEN TODAY 2.8.22

## 2022-02-09 ENCOUNTER — HOSPITAL ENCOUNTER (OUTPATIENT)
Dept: RADIOLOGY | Facility: MEDICAL CENTER | Age: 57
End: 2022-02-09
Attending: INTERNAL MEDICINE
Payer: COMMERCIAL

## 2022-02-09 DIAGNOSIS — C88.4: ICD-10-CM

## 2022-02-09 PROCEDURE — A9552 F18 FDG: HCPCS

## 2022-02-09 NOTE — PROGRESS NOTES
Established Patient     Jaimee Serrano is a 56 y.o. female.    Chief Complaint   Patient presents with   • Diabetes Follow-up             Problems addressed this visit:     This note uses problem-based documentation.  Subjective HPI is included in Assessment & Plan, at bottom of note.   Problem   DM-2, Type 2 diabetes mellitus without complication, with long-term current use of insulin (HCC)                           Past Medical History:   Diagnosis Date   • Acquired hypothyroidism 6/14/2021   • Essential hypertension 6/14/2021   • IBD (inflammatory bowel disease) 6/14/2021    [facility]   • MALT lymphoma (HCC) 6/14/2021   • Pulmonary nodules 6/14/2021   • Type 2 diabetes mellitus without complication, with long-term current use of insulin (HCC) 6/14/2021       Patient Active Problem List   Diagnosis   • IBD (inflammatory bowel disease)   • MALT lymphoma (HCC)   • Endometrioid adenocarcinoma of uterus (HCC)   • DM-2, Type 2 diabetes mellitus without complication, with long-term current use of insulin (HCC)   • Acquired hypothyroidism   • Gastroesophageal reflux disease without esophagitis   • Essential hypertension   • Dyslipidemia   • Pulmonary nodules   • Chronic bilateral low back pain without sciatica   • Elevated ALT measurement   • Mediastinal lymphadenopathy   • Hilar lymphadenopathy   • Cancer with pulmonary metastases (HCC) (possible)    • Palpitations   • Other fatigue   • Orthopnea   • Dyspnea on exertion   • Encounter for completion of form with patient   • Abnormal laboratory test   • Ganglion cyst of finger of left hand   • Dupuytren's contracture of left hand       Past Surgical History:   Procedure Laterality Date   • GASTRIC BYPASS LAPAROSCOPIC  2016    sleeve   • ABDOMINAL HYSTERECTOMY TOTAL      NADER + SBO   • CHOLECYSTECTOMY         Family History   Problem Relation Age of Onset   • No Known Problems Other    • Heart Disease Mother         rhuematic fever, valve replacement   • Heart Failure  "Father        Social History     Tobacco Use   • Smoking status: Never Smoker   • Smokeless tobacco: Never Used   Substance Use Topics   • Alcohol use: Yes     Comment: 2 times a year       Current medications:  (including new changes):  Current Outpatient Medications   Medication Sig Dispense Refill   • insulin lispro (HUMALOG KWIKPEN) 100 UNIT/ML Solution Pen-injector injection PEN Inject 6-14 Units under the skin 4 Times a Day,Before Meals and at Bedtime. 42 mL 1   • Blood Glucose Test Strips Use one One Touch Verio Flex strip to test blood sugar 4 times daily. 400 Strip 0   • atorvastatin (LIPITOR) 40 MG Tab Take 1 Tablet by mouth at bedtime. 90 Tablet 3   • ramipril (ALTACE) 2.5 MG Cap TAKE 1 CAPSULE DAILY 90 Capsule 3   • pantoprazole (PROTONIX) 20 MG tablet TAKE 1 TABLET DAILY 90 Tablet 3   • metformin (GLUCOPHAGE) 1000 MG tablet Take 1,000 mg by mouth 2 times a day with meals.     • levothyroxine (SYNTHROID) 100 MCG Tab Take 100 mcg by mouth every morning on an empty stomach.     • Dulaglutide (TRULICITY) 1.5 MG/0.5ML Solution Pen-injector Inject 0.5 mL under the skin every 7 days. 45 mL 1   • Insulin Degludec (TRESIBA FLEXTOUCH) 100 UNIT/ML Solution Pen-injector Inject 20 Units under the skin every morning. 1800 mL 1     No current facility-administered medications for this visit.       Allergies as of 02/08/2022   • (No Known Allergies)                   Review of Symptoms   (as noted elsewhere, and .....)   GEN/CONST:   Denies fever, chills,    CARDIO:   Denies chest pain, or edema.    RESP:   Denies shortness of breath, or coughing.  GI:    Denies nausea, vomiting, diarrhea,      PSYCH:  Denies anxiety, depression,           Physical Exam  /70 (BP Location: Left arm, Patient Position: Sitting, BP Cuff Size: Large adult)   Pulse 95   Temp 36.2 °C (97.2 °F) (Temporal)   Ht 1.676 m (5' 6\")   Wt 114 kg (252 lb)   SpO2 96%   BMI 40.67 kg/m²   General:  Alert and oriented, No apparent " distress.  Neck: Trachea midline, no masses, no obvious thyromegaly.  Lungs: Easy / unlabored breathing, without difficulty.  Good oxygenation.   MSK:  Good general motion of extremities. Normal ambulation.    Psych:  Appears to have normal mood and affect.        Labs:  Recent / Last-Prior labs reviewed.    BMP and A1c                             Assessment & Plan  (with subjective history and orders):  Of note, the list below is not in a specific nor sortable order.      Problem List Items Addressed This Visit     DM-2, Type 2 diabetes mellitus without complication,   with long-term current use of insulin (HCC)     Chronic and ongoing, DM-2.     DM Meds:       - metformin 1000 mg BID     - Trulicity (dulaglutide 0.5 ml, inj weekly)     - degludec (Tresiba) 22 units QHS     - Lispro (humalog kwikpen) - avg 40 units / day    (4 x/day, as QAC+QHS)    (adjusted with sliding scale, etc).       Denies symptoms or concerns.    Home Glucose Readings:        4x/day:  170-230, 150-175, 140-185, 155-222.  Semi-Annual Screens:        Last A1c - 9.8   (02/04/2022)  Annual Screens:       Kidney function:  GFR:  Normal   (02/04/2022)     Microalbumin Ratio:  Normal  (6/18/2021)     Monofilament test:   Normal   (6/24/2021)      Checks feet at home:  Yes.      Retina Scan:  ... will get at eye ...  Associated issues:      BP goal <140/<80:  Yes     On ACE/ARB:  Yes       LDL goal < 100:  No (102)...  (6/18/2021)    On Statin:  Yes    ... but hold-off increasing, for now...  ... as had elevated ALT then.      PPSV23 given:  Done, (until 64 yo).   Management / Plan:     - Increase long-acting insulin, 2 units, weekly,         until glucose levels <150.         (but stop / back-off, if < 110)  - follow-up in 1 months, with new labs         (and bring home glucose readings).           Relevant Medications    insulin lispro (HUMALOG KWIKPEN) 100 UNIT/ML Solution Pen-injector injection PEN    Blood Glucose Test Strips      Other  Visit Diagnoses     Uncontrolled type 2 diabetes mellitus with hyperglycemia (HCC)        Relevant Medications    insulin lispro (HUMALOG KWIKPEN) 100 UNIT/ML Solution Pen-injector injection PEN    Blood Glucose Test Strips        Of note, the above list is not in a specific nor sortable order.                  Diagnosis Table, with orders:  1. DM-2, Type 2 diabetes mellitus without complication, with long-term current use of insulin (HCC)  insulin lispro (HUMALOG KWIKPEN) 100 UNIT/ML Solution Pen-injector injection PEN    Blood Glucose Test Strips   2. Uncontrolled type 2 diabetes mellitus with hyperglycemia (HCC)   ... currently uncontrolled....  Blood Glucose Test Strips                 Follow-up:  1 month  (4 week DM-2 check, adjusting insulins)              A computerized dictation system may have been used in this note.    Despite review, there may be some errors in spelling or grammar.    Luis Eduardo Hamm M.D.  2/8/2022

## 2022-02-09 NOTE — PATIENT INSTRUCTIONS
Please gradually increase the long acting insulin (Tresiba) by 2 units each week... Then check blood sugars...  Continue to increase by 2 units every week, until your glucose levels are at or near 150....    (and you may need to reduce the sliding scale, for your short-acting insulins a small amount)....    Please return in 4 weeks, to re-evaluate.

## 2022-02-09 NOTE — ASSESSMENT & PLAN NOTE
Chronic and ongoing, DM-2.     DM Meds:       - metformin 1000 mg BID     - Trulicity (dulaglutide 0.5 ml, inj weekly)     - degludec (Tresiba) 22 units QHS     - Lispro (humalog kwikpen) - avg 40 units / day    (4 x/day, as QAC+QHS)    (adjusted with sliding scale, etc).       Denies symptoms or concerns.    Home Glucose Readings:        4x/day:  170-230, 150-175, 140-185, 155-222.  Semi-Annual Screens:        Last A1c - 9.8   (02/04/2022)  Annual Screens:       Kidney function:  GFR:  Normal   (02/04/2022)     Microalbumin Ratio:  Normal  (6/18/2021)     Monofilament test:   Normal   (6/24/2021)      Checks feet at home:  Yes.      Retina Scan:  ... will get at eye ...  Associated issues:      BP goal <140/<80:  Yes     On ACE/ARB:  Yes       LDL goal < 100:  No (102)...  (6/18/2021)    On Statin:  Yes    ... but hold-off increasing, for now...  ... as had elevated ALT then.      PPSV23 given:  Done, (until 66 yo).   Management / Plan:     - Increase long-acting insulin, 2 units, weekly,         until glucose levels <150.         (but stop / back-off, if < 110)  - follow-up in 1 months, with new labs         (and bring home glucose readings).

## 2022-02-11 DIAGNOSIS — Z79.4 TYPE 2 DIABETES MELLITUS WITHOUT COMPLICATION, WITH LONG-TERM CURRENT USE OF INSULIN (HCC): ICD-10-CM

## 2022-02-11 DIAGNOSIS — E11.9 TYPE 2 DIABETES MELLITUS WITHOUT COMPLICATION, WITH LONG-TERM CURRENT USE OF INSULIN (HCC): ICD-10-CM

## 2022-02-11 RX ORDER — INSULIN LISPRO 200 [IU]/ML
INJECTION, SOLUTION SUBCUTANEOUS
Qty: 21 ML | Refills: 1 | Status: SHIPPED | OUTPATIENT
Start: 2022-02-11 | End: 2022-08-11 | Stop reason: SDUPTHER

## 2022-02-11 RX ORDER — INSULIN LISPRO 100 [IU]/ML
6-14 INJECTION, SOLUTION INTRAVENOUS; SUBCUTANEOUS
Qty: 42 ML | Refills: 1 | Status: CANCELLED | OUTPATIENT
Start: 2022-02-11

## 2022-02-11 RX ORDER — LEVOTHYROXINE SODIUM 0.1 MG/1
100 TABLET ORAL
Qty: 90 TABLET | Refills: 2 | Status: SHIPPED | OUTPATIENT
Start: 2022-02-11 | End: 2022-08-11 | Stop reason: SDUPTHER

## 2022-02-12 NOTE — TELEPHONE ENCOUNTER
Contacted by pharmacy.  Lispro (Humalog KwikPen) formulary for 200 units/mL, not 100.… Prescription updated, but units per injection remain the same, and same instructions as previously placed.    Also refilled levothyroxine and Metformin, per pharmacy request.

## 2022-02-18 ENCOUNTER — PRE-ADMISSION TESTING (OUTPATIENT)
Dept: ADMISSIONS | Facility: MEDICAL CENTER | Age: 57
End: 2022-02-18
Attending: ORTHOPAEDIC SURGERY
Payer: COMMERCIAL

## 2022-02-18 RX ORDER — M-VIT,TX,IRON,MINS/CALC/FOLIC 27MG-0.4MG
1 TABLET ORAL DAILY
COMMUNITY

## 2022-02-18 NOTE — OR NURSING
Patient advised to consult Dr. AMY Hamm PMD for how to take diabetic medication prior to procedure. No medications AM of surgery 3/3/2022

## 2022-03-01 ENCOUNTER — OFFICE VISIT (OUTPATIENT)
Dept: SLEEP MEDICINE | Facility: MEDICAL CENTER | Age: 57
End: 2022-03-01
Payer: COMMERCIAL

## 2022-03-01 VITALS
OXYGEN SATURATION: 99 % | DIASTOLIC BLOOD PRESSURE: 74 MMHG | BODY MASS INDEX: 41.14 KG/M2 | HEIGHT: 66 IN | SYSTOLIC BLOOD PRESSURE: 114 MMHG | WEIGHT: 256 LBS | HEART RATE: 70 BPM

## 2022-03-01 DIAGNOSIS — C55 ENDOMETRIOID ADENOCARCINOMA OF UTERUS (HCC): ICD-10-CM

## 2022-03-01 DIAGNOSIS — C88.4 MALT LYMPHOMA (HCC): ICD-10-CM

## 2022-03-01 DIAGNOSIS — J98.4 MULTIPLE IDIOPATHIC PULMONARY CYSTS: ICD-10-CM

## 2022-03-01 DIAGNOSIS — R91.8 PULMONARY NODULES: ICD-10-CM

## 2022-03-01 DIAGNOSIS — G47.33 OSA (OBSTRUCTIVE SLEEP APNEA): ICD-10-CM

## 2022-03-01 DIAGNOSIS — E11.9 TYPE 2 DIABETES MELLITUS WITHOUT COMPLICATION, WITH LONG-TERM CURRENT USE OF INSULIN (HCC): ICD-10-CM

## 2022-03-01 DIAGNOSIS — R06.02 SOB (SHORTNESS OF BREATH): ICD-10-CM

## 2022-03-01 DIAGNOSIS — Z79.4 TYPE 2 DIABETES MELLITUS WITHOUT COMPLICATION, WITH LONG-TERM CURRENT USE OF INSULIN (HCC): ICD-10-CM

## 2022-03-01 PROCEDURE — 99214 OFFICE O/P EST MOD 30 MIN: CPT | Performed by: INTERNAL MEDICINE

## 2022-03-01 PROCEDURE — 94761 N-INVAS EAR/PLS OXIMETRY MLT: CPT | Performed by: INTERNAL MEDICINE

## 2022-03-01 ASSESSMENT — ENCOUNTER SYMPTOMS
WHEEZING: 1
CHEST TIGHTNESS: 0
SHORTNESS OF BREATH: 1
DYSPNEA AT REST: 0
HEMOPTYSIS: 0
RESPIRATORY SYMPTOMS COMMENTS: YES

## 2022-03-01 ASSESSMENT — FIBROSIS 4 INDEX: FIB4 SCORE: 1.79

## 2022-03-01 NOTE — PROCEDURES
Multi-Ox Readings  Multi Ox #1 Room air   O2 sat % at rest 97   O2 sat % on exertion 91   O2 sat average on exertion     Multi Ox #2     O2 sat % at rest     O2 sat % on exertion     O2 sat average on exertion       Oxygen Use     Oxygen Frequency     Duration of need     Is the patient mobile within the home?     CPAP Use?     BIPAP Use?     Servo Titration

## 2022-03-02 NOTE — H&P
Referring Provider: Luis Eduardo Hamm M*  PCP: Luis Eduardo Hamm M.D.  Reason for visit:   Chief Complaint   Patient presents with   • Left Hand - Pain           History: Stefania Serrano is a pleasant 56 y.o. female coming in today for evaluation of a mass on the index finger.  Symptoms have been going on for quite some time.  She does have a known history of Dupuytren's.  She denies any sort of trauma.  Denies numbness or tingling.  Denies any mechanical symptoms.  They have pain right around the mass as well as when any pressure is placed on the mass.  It affects quality of life.  They have been trying some conservative measures at home without much relief.        PMH:   Past Medical History:   Diagnosis Date   • Acquired hypothyroidism 6/14/2021   • Essential hypertension 6/14/2021   • IBD (inflammatory bowel disease) 6/14/2021    Chron's   • MALT lymphoma (HCC) 6/14/2021   • Pulmonary nodules 6/14/2021   • Type 2 diabetes mellitus without complication, with long-term current use of insulin (HCC) 6/14/2021       PSH:   Past Surgical History:   Procedure Laterality Date   • GASTRIC BYPASS LAPAROSCOPIC  2016    sleeve   • ABDOMINAL HYSTERECTOMY TOTAL      NADER + SBO   • CHOLECYSTECTOMY         Tobacco history:   Social History     Tobacco Use   Smoking Status Never Smoker   Smokeless Tobacco Never Used       Medications:   Current Outpatient Medications:   •  atorvastatin (LIPITOR) 40 MG Tab, Take 1 Tablet by mouth at bedtime., Disp: 90 Tablet, Rfl: 3  •  insulin lispro (HUMALOG KWIKPEN) 100 UNIT/ML Solution Pen-injector injection PEN, Inject 10 Units under the skin 3 times a day before meals., Disp: 3 mL, Rfl: 2  •  ramipril (ALTACE) 2.5 MG Cap, TAKE 1 CAPSULE DAILY, Disp: 90 Capsule, Rfl: 3  •  pantoprazole (PROTONIX) 20 MG tablet, TAKE 1 TABLET DAILY, Disp: 90 Tablet, Rfl: 3  •  meloxicam (MOBIC) 7.5 MG Tab, Take 1 tablet by mouth every day., Disp: 30 tablet, Rfl: 1  •  metformin (GLUCOPHAGE) 1000 MG tablet,  Take 1,000 mg by mouth 2 times a day with meals., Disp: , Rfl:   •  levothyroxine (SYNTHROID) 100 MCG Tab, Take 100 mcg by mouth every morning on an empty stomach., Disp: , Rfl:   •  Dulaglutide (TRULICITY) 1.5 MG/0.5ML Solution Pen-injector, Inject 0.5 mL under the skin every 7 days., Disp: 45 mL, Rfl: 1  •  Insulin Degludec (TRESIBA FLEXTOUCH) 100 UNIT/ML Solution Pen-injector, Inject 20 Units under the skin every morning., Disp: 1800 mL, Rfl: 1    Allergies: Patient has no known allergies.    Exam:  General: No acute distress, alert and oriented  Upper extremity: The hand and wrist are atraumatic, skin intact.  There is a mass on the upper aspect of the index finger.  Tender to palpation. Consistent with a Dupuytren's nodule.  All tendons intact.  Sensation intact to light touch in all distributions.    Imaging: please see separate imaging dictation.    Assessment/Plan: 56 y.o. female with left index finger mass, consistent with Dupuytren's nodule.  Could also be a ganglion cyst but much more likely clinically.  We went over their x-rays.  We discussed the diagnosis, anatomy, treatment options.  We discussed a variety of different conservative and operative options.  They elected to proceed with mass excision/Dupuytren's fasciectomy. Risks, benefits, alternatives, and expectations were discussed.  We discussed that final diagnosis is dependent on pathology. They expressed understanding.          Brien Villagomez M.D.

## 2022-03-02 NOTE — PROGRESS NOTES
COVID-19 Pre-surgery screenin. Do you have an undiagnosed respiratory illness or symptoms such as coughing or sneezing? NO (Yes/No)  a. Onset of Sx   b. Acute vs. chronic respiratory illness     2. Do you have an unexplained fever greater than 100.4 degrees Fahrenheit or 38 degrees Celsius?     NO (Yes/No)    3. Have you had direct exposure to a patient who tested positive for Covid-19?    NO (Yes/No)    4. Have you had any loss of your sense of taste or smell? Have you had N/V or sore throat? NO    Patient has been informed of visitor policy and asked to wear a mask upon entering the hospital   YES (Yes/No)

## 2022-03-03 ENCOUNTER — HOSPITAL ENCOUNTER (OUTPATIENT)
Facility: MEDICAL CENTER | Age: 57
End: 2022-03-03
Attending: ORTHOPAEDIC SURGERY | Admitting: ORTHOPAEDIC SURGERY
Payer: COMMERCIAL

## 2022-03-03 ENCOUNTER — ANESTHESIA (OUTPATIENT)
Dept: SURGERY | Facility: MEDICAL CENTER | Age: 57
End: 2022-03-03
Payer: COMMERCIAL

## 2022-03-03 ENCOUNTER — ANESTHESIA EVENT (OUTPATIENT)
Dept: SURGERY | Facility: MEDICAL CENTER | Age: 57
End: 2022-03-03
Payer: COMMERCIAL

## 2022-03-03 VITALS
HEART RATE: 78 BPM | DIASTOLIC BLOOD PRESSURE: 76 MMHG | TEMPERATURE: 97.7 F | SYSTOLIC BLOOD PRESSURE: 140 MMHG | WEIGHT: 254.19 LBS | OXYGEN SATURATION: 96 % | RESPIRATION RATE: 16 BRPM | BODY MASS INDEX: 40.85 KG/M2 | HEIGHT: 66 IN

## 2022-03-03 DIAGNOSIS — M72.0 DUPUYTREN'S CONTRACTURE OF LEFT HAND: ICD-10-CM

## 2022-03-03 DIAGNOSIS — M67.442 GANGLION CYST OF FINGER OF LEFT HAND: ICD-10-CM

## 2022-03-03 LAB
GLUCOSE BLD STRIP.AUTO-MCNC: 169 MG/DL (ref 65–99)
PATHOLOGY CONSULT NOTE: NORMAL

## 2022-03-03 PROCEDURE — 160025 RECOVERY II MINUTES (STATS): Performed by: ORTHOPAEDIC SURGERY

## 2022-03-03 PROCEDURE — 160048 HCHG OR STATISTICAL LEVEL 1-5: Performed by: ORTHOPAEDIC SURGERY

## 2022-03-03 PROCEDURE — 88304 TISSUE EXAM BY PATHOLOGIST: CPT

## 2022-03-03 PROCEDURE — 82962 GLUCOSE BLOOD TEST: CPT

## 2022-03-03 PROCEDURE — 700105 HCHG RX REV CODE 258: Performed by: ORTHOPAEDIC SURGERY

## 2022-03-03 PROCEDURE — 501838 HCHG SUTURE GENERAL: Performed by: ORTHOPAEDIC SURGERY

## 2022-03-03 PROCEDURE — 160009 HCHG ANES TIME/MIN: Performed by: ORTHOPAEDIC SURGERY

## 2022-03-03 PROCEDURE — 700111 HCHG RX REV CODE 636 W/ 250 OVERRIDE (IP): Performed by: ANESTHESIOLOGY

## 2022-03-03 PROCEDURE — 26123 RELEASE PALM CONTRACTURE: CPT | Mod: LT | Performed by: ORTHOPAEDIC SURGERY

## 2022-03-03 PROCEDURE — 160046 HCHG PACU - 1ST 60 MINS PHASE II: Performed by: ORTHOPAEDIC SURGERY

## 2022-03-03 PROCEDURE — 700101 HCHG RX REV CODE 250

## 2022-03-03 PROCEDURE — 700111 HCHG RX REV CODE 636 W/ 250 OVERRIDE (IP): Performed by: ORTHOPAEDIC SURGERY

## 2022-03-03 PROCEDURE — 160027 HCHG SURGERY MINUTES - 1ST 30 MINS LEVEL 2: Performed by: ORTHOPAEDIC SURGERY

## 2022-03-03 PROCEDURE — 700101 HCHG RX REV CODE 250: Performed by: ORTHOPAEDIC SURGERY

## 2022-03-03 RX ORDER — LIDOCAINE HYDROCHLORIDE 10 MG/ML
INJECTION, SOLUTION INFILTRATION; PERINEURAL
Status: DISCONTINUED | OUTPATIENT
Start: 2022-03-03 | End: 2022-03-03 | Stop reason: HOSPADM

## 2022-03-03 RX ORDER — OXYCODONE HCL 5 MG/5 ML
5 SOLUTION, ORAL ORAL
Status: DISCONTINUED | OUTPATIENT
Start: 2022-03-03 | End: 2022-03-03 | Stop reason: HOSPADM

## 2022-03-03 RX ORDER — KETOROLAC TROMETHAMINE 30 MG/ML
INJECTION, SOLUTION INTRAMUSCULAR; INTRAVENOUS PRN
Status: DISCONTINUED | OUTPATIENT
Start: 2022-03-03 | End: 2022-03-03 | Stop reason: SURG

## 2022-03-03 RX ORDER — ONDANSETRON 2 MG/ML
4 INJECTION INTRAMUSCULAR; INTRAVENOUS
Status: DISCONTINUED | OUTPATIENT
Start: 2022-03-03 | End: 2022-03-03 | Stop reason: HOSPADM

## 2022-03-03 RX ORDER — SODIUM CHLORIDE, SODIUM LACTATE, POTASSIUM CHLORIDE, CALCIUM CHLORIDE 600; 310; 30; 20 MG/100ML; MG/100ML; MG/100ML; MG/100ML
INJECTION, SOLUTION INTRAVENOUS CONTINUOUS
Status: DISCONTINUED | OUTPATIENT
Start: 2022-03-03 | End: 2022-03-03 | Stop reason: HOSPADM

## 2022-03-03 RX ORDER — HYDRALAZINE HYDROCHLORIDE 20 MG/ML
5 INJECTION INTRAMUSCULAR; INTRAVENOUS
Status: DISCONTINUED | OUTPATIENT
Start: 2022-03-03 | End: 2022-03-03 | Stop reason: HOSPADM

## 2022-03-03 RX ORDER — OXYCODONE HCL 5 MG/5 ML
10 SOLUTION, ORAL ORAL
Status: DISCONTINUED | OUTPATIENT
Start: 2022-03-03 | End: 2022-03-03 | Stop reason: HOSPADM

## 2022-03-03 RX ORDER — ONDANSETRON 2 MG/ML
INJECTION INTRAMUSCULAR; INTRAVENOUS PRN
Status: DISCONTINUED | OUTPATIENT
Start: 2022-03-03 | End: 2022-03-03 | Stop reason: SURG

## 2022-03-03 RX ORDER — BUPIVACAINE HYDROCHLORIDE 5 MG/ML
INJECTION, SOLUTION PERINEURAL
Status: DISCONTINUED | OUTPATIENT
Start: 2022-03-03 | End: 2022-03-03 | Stop reason: HOSPADM

## 2022-03-03 RX ORDER — CEFAZOLIN SODIUM 1 G/3ML
2 INJECTION, POWDER, FOR SOLUTION INTRAMUSCULAR; INTRAVENOUS ONCE
Status: COMPLETED | OUTPATIENT
Start: 2022-03-03 | End: 2022-03-03

## 2022-03-03 RX ORDER — LABETALOL HYDROCHLORIDE 5 MG/ML
5 INJECTION, SOLUTION INTRAVENOUS
Status: DISCONTINUED | OUTPATIENT
Start: 2022-03-03 | End: 2022-03-03 | Stop reason: HOSPADM

## 2022-03-03 RX ORDER — BUPIVACAINE HYDROCHLORIDE 5 MG/ML
INJECTION, SOLUTION EPIDURAL; INTRACAUDAL
Status: DISCONTINUED
Start: 2022-03-03 | End: 2022-03-03 | Stop reason: HOSPADM

## 2022-03-03 RX ORDER — OXYCODONE HYDROCHLORIDE 5 MG/1
5 TABLET ORAL EVERY 4 HOURS PRN
Qty: 8 TABLET | Refills: 0 | Status: SHIPPED | OUTPATIENT
Start: 2022-03-03 | End: 2022-03-08

## 2022-03-03 RX ORDER — LIDOCAINE HYDROCHLORIDE 10 MG/ML
INJECTION, SOLUTION EPIDURAL; INFILTRATION; INTRACAUDAL; PERINEURAL
Status: DISCONTINUED
Start: 2022-03-03 | End: 2022-03-03 | Stop reason: HOSPADM

## 2022-03-03 RX ADMIN — CEFAZOLIN 2 G: 330 INJECTION, POWDER, FOR SOLUTION INTRAMUSCULAR; INTRAVENOUS at 09:02

## 2022-03-03 RX ADMIN — KETOROLAC TROMETHAMINE 30 MG: 30 INJECTION, SOLUTION INTRAMUSCULAR at 09:08

## 2022-03-03 RX ADMIN — SODIUM CHLORIDE, POTASSIUM CHLORIDE, SODIUM LACTATE AND CALCIUM CHLORIDE: 600; 310; 30; 20 INJECTION, SOLUTION INTRAVENOUS at 07:54

## 2022-03-03 RX ADMIN — ONDANSETRON 4 MG: 2 INJECTION INTRAMUSCULAR; INTRAVENOUS at 09:04

## 2022-03-03 ASSESSMENT — FIBROSIS 4 INDEX: FIB4 SCORE: 1.79

## 2022-03-03 ASSESSMENT — PAIN SCALES - GENERAL: PAIN_LEVEL: 0

## 2022-03-03 NOTE — OP REPORT
OPERATIVE NOTE     DATE OF PROCEDURE: 3/3/2022            PRE-OP DIAGNOSIS: Left hand Dupuytren's contracture            POST-OP DIAGNOSIS: same            PROCEDURE: Left index finger Dupuytren's fasciectomy            SURGEON: Brien Villagomez M.D. - Primary            ANESTHESIA: Local only            ESTIMATED BLOOD LOSS: Minimal                   SPECIMENS: 1 specimen sent to pathology            COMPLICATIONS: none            CONDITION: stable to PACU            OPERATIVE INDICATIONS AND DESCRIPTION OF PROCEDURE: Stefania is a pleasant 56-year-old female who presented to my office with a painful and bothersome mass in the left index finger in the PIP flexion crease.  It was mostly consistent with a Dupuytren's nodule from Dupuytren's contracture.  It was tender and painful and limiting motion.  She Dextra did not have a flexion contracture.  We discussed treatment options.  She wanted to have this removed and I felt this was reasonable.  We discussed Dupuytren's etiology and recurrence.  We discussed surgical technique, risk, benefits, alternatives..  We discussed risks including, but not limited to, bleeding, infection, wound healing issues, recurrence, contracture, painful scar, numbness in the finger, loss of motion, stiffness, wound healing issues, hematoma, seroma, risks of anesthesia.  She elected to proceed.  I saw her in the preoperative holding area, identified her, and marked the left index finger.  She was taken back to the operating room.  Timeout was performed.  I performed a digital block using a mixture of 1% lidocaine and 0.5% bupivacaine.  A tourniquet was placed in the forearm and the upper extremity was prepped and draped in usual sterile fashion.  A timeout was performed.  A finger tourniquet was applied to the index finger.  I made a Mai incision centered over the PIP joint.  I sharply elevated skin flaps over the Dupuytren's tissue which was adherent to the skin.  I did not perforate the  skin.  I then proceeded by identifying the digital neurovascular bundle on each side of the finger.  I then sharply excised the Dupuytren's tissue.  I examined the wound to make sure there was no tissue remaining.  The flexor retinaculum was intact.  The digital neurovascular bundles were both intact.  The wound was thoroughly irrigated with normal saline.  The skin was closed with 4-0 nylon.  Sterile dressings were applied.  She was transferred to the PACU in stable condition.

## 2022-03-03 NOTE — ANESTHESIA POSTPROCEDURE EVALUATION
Patient: Jaimee Serrano    Procedure Summary     Date: 03/03/22 Room / Location: [facility] ROOM 21 / SURGERY SAME DAY [facility]    Anesthesia Start: 0856 Anesthesia Stop: 0917    Procedure: LEFT INDEX FINGER FASCIECTOMY (Left Finger) Diagnosis:       Dupuytren's contracture of left hand      (Dupuytren's contracture of left hand )    Surgeons: Brien Villagomez M.D. Responsible Provider: Efe Henderson M.D.    Anesthesia Type: general, MAC ASA Status: 3          Final Anesthesia Type: general, MAC  Last vitals  BP   Blood Pressure: 140/76    Temp   36.5 °C (97.7 °F)    Pulse   78   Resp   16    SpO2   96 %      Anesthesia Post Evaluation    Patient location during evaluation: PACU  Patient participation: complete - patient participated  Level of consciousness: awake and alert  Pain score: 0    Airway patency: patent  Anesthetic complications: no  Cardiovascular status: hemodynamically stable  Respiratory status: acceptable  Hydration status: euvolemic    PONV: none          No complications documented.     Nurse Pain Score: 0 (NPRS)

## 2022-03-03 NOTE — DISCHARGE INSTRUCTIONS
ACTIVITY: Rest and take it easy for the first 24 hours.  A responsible adult is recommended to remain with you during that time.  It is normal to feel sleepy.  We encourage you to not do anything that requires balance, judgment or coordination.    MILD FLU-LIKE SYMPTOMS ARE NORMAL. YOU MAY EXPERIENCE GENERALIZED MUSCLE ACHES, THROAT IRRITATION, HEADACHE AND/OR SOME NAUSEA.    FOR 24 HOURS DO NOT:  Drive, operate machinery or run household appliances.  Drink beer or alcoholic beverages.   Make important decisions or sign legal documents.    SPECIAL INSTRUCTIONS: See Dr. Villagomez's discharge instructions    DIET: To avoid nausea, slowly advance diet as tolerated, avoiding spicy or greasy foods for the first day.  Add more substantial food to your diet according to your physician's instructions.  Babies can be fed formula or breast milk as soon as they are hungry.  INCREASE FLUIDS AND FIBER TO AVOID CONSTIPATION.    SURGICAL DRESSING/BATHING: *keep dressing clean and dry.  May shower tomorrow with plastic covering splint.**    FOLLOW-UP APPOINTMENT:  A follow-up appointment should be arranged with your doctor in *call to schedule*    You should CALL YOUR PHYSICIAN if you develop:  Fever greater than 101 degrees F.  Pain not relieved by medication, or persistent nausea or vomiting.  Excessive bleeding (blood soaking through dressing) or unexpected drainage from the wound.  Extreme redness or swelling around the incision site, drainage of pus or foul smelling drainage.  Inability to urinate or empty your bladder within 8 hours.  Problems with breathing or chest pain.    You should call 911 if you develop problems with breathing or chest pain.  If you are unable to contact your doctor or surgical center, you should go to the nearest emergency room or urgent care center.  Physician's telephone #: Dr Villagomez [phone]****    If any questions arise, call your doctor.  If your doctor is not available, please feel free to  call the Surgical Center at [phone].     A registered nurse may call you a few days after your surgery to see how you are doing after your procedure.    MEDICATIONS: Resume taking daily medication.  Take prescribed pain medication with food.  If no medication is prescribed, you may take non-aspirin pain medication if needed.  PAIN MEDICATION CAN BE VERY CONSTIPATING.  Take a stool softener or laxative such as senokot, pericolace, or milk of magnesia if needed.    Prescription given for *already given**.  Last pain medication given at *none**.    If your physician has prescribed pain medication that includes Acetaminophen (Tylenol), do not take additional Acetaminophen (Tylenol) while taking the prescribed medication.    Depression / Suicide Risk    As you are discharged from this [facility] facility, it is important to learn how to keep safe from harming yourself.    Recognize the warning signs:  · Abrupt changes in personality, positive or negative- including increase in energy   · Giving away possessions  · Change in eating patterns- significant weight changes-  positive or negative  · Change in sleeping patterns- unable to sleep or sleeping all the time   · Unwillingness or inability to communicate  · Depression  · Unusual sadness, discouragement and loneliness  · Talk of wanting to die  · Neglect of personal appearance   · Rebelliousness- reckless behavior  · Withdrawal from people/activities they love  · Confusion- inability to concentrate     If you or a loved one observes any of these behaviors or has concerns about self-harm, here's what you can do:  · Talk about it- your feelings and reasons for harming yourself  · Remove any means that you might use to hurt yourself (examples: pills, rope, extension cords, firearm)  · Get professional help from the community (Mental Health, Substance Abuse, psychological counseling)  · Do not be alone:Call your Safe Contact- someone whom you trust who will be there  for you.  · Call your local CRISIS HOTLINE 102-5099 or 553-444-7265  · Call your local Children's Mobile Crisis Response Team [place] (971) 170-5591 or www.tipple.me  · Call the toll free National Suicide Prevention Hotlines   · National Suicide Prevention Lifeline 057-223-CPXY (4420)  · National Actimize Line Network 800-SUICIDE (131-2391)

## 2022-03-03 NOTE — LETTER
January 27, 2022    Patient Name: Jaimee Serrano  Surgeon Name: Brien Villagomez M.D.  Surgery Facility: [facility] ([facility])  Surgery Date: 2/10/2022    The time of your surgery is not final and may change up to and until the day of your surgery. You will be contacted 24-48 hours prior to your surgery date with your check-in and surgery time.    If you will not be at one of the below numbers please call/text the surgery scheduler at [phone]  Preferred Phone: [phone]    BEFORE YOUR SURGERY  Pre Registration and/or Lab Work must be done within and no earlier than 28 days prior to your surgery date. Please call [facility] at [phone] for an appointment as soon as possible.     Instructions: Bring a list of all medications you are taking including the dosing and frequency.    Please refrain from smoking any substance after midnight prior to surgery. Smoking may interfere with the anesthetic and frequently produces nausea during the recovery period.    Continue taking all lifesaving medications. Including the morning of your surgery with small sip of water.    Please read the MEDICATION INSTRUCTIONS below completely.    DAY OF YOUR SURGERY  Nothing to eat or drink after midnight     Please arrive at the hospital/surgery center at the check-in time provided.     An adult will need to bring you and take you home after your surgery.     AFTER YOUR SURGERY  Post op Appointment:   Date: 02/28/22   Time: 130PM   With: Brien Villagomez M.D.   Location: [address] [place] [address]            TIME OFF WORK  FMLA or Disability forms can be faxed directly to: [phone] or you may drop them off at [address] [place] [address]. Our office charges a $35.00 fee per form. Forms will be completed within 10 business days of drop off and payment received. For the status of your forms you may contact our disability office directly [phone].    MEDICATION INSTRUCTIONS  The following medications should be stopped a minimum of 10 days prior to surgery:  All over the counter, Supplements & Herbal medications    Anorectics: Phentermine (Adipex-P, Lomaira and Suprenza), Phentermine-topiramate (Qsymia), Bupropion-naltrexone (Contrave)    Opiod Partial Agonists/Opioid Antagonists: Buprenorphine (Subocone, Belbuca, Butrans, Probuphine Implant, Sublocade), Naltrexone (ReVia, Vivitrol), Naloxone    Amphetamines: Dextroamphetamine/Amphetamine (Adderall, Mydayis), Methylphenidate Hydrochloride (Concerta, Metadate, Methylin, Ritalin)    The following medications should be stopped 5 days prior to surgery:  Blood Thinners: Any Aspirin, Aspirin products, anti-inflammatories such as ibuprofen and any blood thinners such as Coumadin and Plavix. Please consult your prescribing physician if you are on life saving blood thinners, in regards to when to stop medications prior to surgery.     The following medications should be stopped a minimum of 3 days prior to surgery:  PDE-5 inhibitors: Sildenafil (Viagra), Tadalafil (Cialis), Vardenafil (Levitra), Avanafil (Stendra)    MAO Inhibitors: Rasagiline (Azilect), Selegiline (Eldepryl, Emsam, Selapar), Isocarboxazid (Marplan), Phenelzine (Nardil)

## 2022-03-03 NOTE — ANESTHESIA PREPROCEDURE EVALUATION
Case: [id number] Date/Time: 03/03/22 0830    Procedure: LEFT INDEX FINGER FASCIECTOMY (Left )    Diagnosis: Dupuytren's contracture of left hand [M72.0]    Pre-op diagnosis: Dupuytren's contracture of left hand [M72.0]    Location: [facility] ROOM 21 / SURGERY SAME DAY [facility]    Surgeons: Brien Villagomez M.D.          Relevant Problems   PULMONARY   (positive) Dyspnea on exertion   (positive) Orthopnea      CARDIAC   (positive) Dyspnea on exertion   (positive) Essential hypertension   (positive) Orthopnea      GI   (positive) Gastroesophageal reflux disease without esophagitis      ENDO   (positive) Acquired hypothyroidism   (positive) DM-2, Type 2 diabetes mellitus without complication, with long-term current use of insulin (HCC)      Other   (positive) Hilar lymphadenopathy   (positive) Mediastinal lymphadenopathy       Physical Exam    Airway   Mallampati: II  TM distance: >3 FB  Neck ROM: full       Cardiovascular - normal exam  Rhythm: regular  Rate: normal  (-) murmur     Dental - normal exam           Pulmonary - normal exam  Breath sounds clear to auscultation     Abdominal    Neurological - normal exam                 Anesthesia Plan    ASA 3   ASA physical status 3 criteria: diabetes - poorly controlled    Plan - general and MAC       Airway plan will be natural airway          Induction: intravenous    Postoperative Plan: Postoperative administration of opioids is intended.    Pertinent diagnostic labs and testing reviewed    Informed Consent:    Anesthetic plan and risks discussed with patient.    Use of blood products discussed with: patient whom consented to blood products.

## 2022-03-03 NOTE — ANESTHESIA TIME REPORT
Anesthesia Start and Stop Event Times     Date Time Event    3/3/2022 0845 Ready for Procedure     0856 Anesthesia Start     0917 Anesthesia Stop        Responsible Staff  03/03/22    Name Role Begin End    Efe Henderson M.D. Anesth 0856 0917        Preop Diagnosis (Free Text):  Pre-op Diagnosis     Dupuytren's contracture of left hand         Preop Diagnosis (Codes):    Premium Reason  Non-Premium    Comments:

## 2022-03-03 NOTE — OR NURSING
0915 Received pt from the OR with Dr Henderson. Received report from  and RN.  VSS, in no signs of distress, dressing to left index finger CDI, CMS intact, left hand elevated. Pt denies pain, taking sips of water.    0920 Pt up and dressed, denies pain, dressing remains CDI.     0933 Discharge instructions given to patient, all questions/concerns addressed.    0935 Pt discharged, taken downstairs. Pt ready for her uber ride.

## 2022-03-03 NOTE — LETTER
January 31, 2022    Patient Name: aJimee Serrano  Surgeon Name: Brien Villagomez M.D.  Surgery Facility: [facility] ([facility])  Surgery Date: 3/3/2022    The time of your surgery is not final and may change up to and until the day of your surgery. You will be contacted 24-48 hours prior to your surgery date with your check-in and surgery time.    If you will not be at one of the below numbers please call/text the surgery scheduler at [phone]  Preferred Phone: [phone]    BEFORE YOUR SURGERY  Pre Registration and/or Lab Work must be done within and no earlier than 28 days prior to your surgery date. Please call [facility] at [phone] for an appointment as soon as possible.     Instructions: Bring a list of all medications you are taking including the dosing and frequency.    Please refrain from smoking any substance after midnight prior to surgery. Smoking may interfere with the anesthetic and frequently produces nausea during the recovery period.    Continue taking all lifesaving medications. Including the morning of your surgery with small sip of water.    Please read the MEDICATION INSTRUCTIONS below completely.    DAY OF YOUR SURGERY  Nothing to eat or drink after midnight     Please arrive at the hospital/surgery center at the check-in time provided.     An adult will need to bring you and take you home after your surgery.     AFTER YOUR SURGERY  Post op Appointment:   Date: 03/14/22   Time: 130PM   With: Brien Villagomez M.D.   Location: [address] [place] [address]            TIME OFF WORK  FMLA or Disability forms can be faxed directly to: [phone] or you may drop them off at [facility] [address]. Our office charges a $35.00 fee per form. Forms will be completed within 10 business days of drop off and payment received. For the status of your forms you may contact our disability office directly [phone].    MEDICATION INSTRUCTIONS  The following medications should be stopped a minimum of 10 days prior to surgery:  All over the counter, Supplements & Herbal medications    Anorectics: Phentermine (Adipex-P, Lomaira and Suprenza), Phentermine-topiramate (Qsymia), Bupropion-naltrexone (Contrave)    Opiod Partial Agonists/Opioid Antagonists: Buprenorphine (Subocone, Belbuca, Butrans, Probuphine Implant, Sublocade), Naltrexone (ReVia, Vivitrol), Naloxone    Amphetamines: Dextroamphetamine/Amphetamine (Adderall, Mydayis), Methylphenidate Hydrochloride (Concerta, Metadate, Methylin, Ritalin)    The following medications should be stopped 5 days prior to surgery:  Blood Thinners: Any Aspirin, Aspirin products, anti-inflammatories such as ibuprofen and any blood thinners such as Coumadin and Plavix. Please consult your prescribing physician if you are on life saving blood thinners, in regards to when to stop medications prior to surgery.     The following medications should be stopped a minimum of 3 days prior to surgery:  PDE-5 inhibitors: Sildenafil (Viagra), Tadalafil (Cialis), Vardenafil (Levitra), Avanafil (Stendra)    MAO Inhibitors: Rasagiline (Azilect), Selegiline (Eldepryl, Emsam, Selapar), Isocarboxazid (Marplan), Phenelzine (Nardil)

## 2022-03-03 NOTE — DISCHARGE INSTR - OTHER INFO
DR. VILLAGOMEZ'S POST-OPERATIVE INSTRUCTIONS    You have just undergone a surgery by Dr. Villagomez in the operating room.  It is our wish that your postoperative recovery be as quick and comfortable as possible.  Please carefully review the following items that are important for your recovery.    After any operation, a certain degree of pain is to be expected. Take Advil (ibuprofen) and Extra Strength Tylenol as first line medications for mild to moderate pain. Taking each one every 6 hours, and staggering them so that you are taking one medicine every 3 hours, is the most effective. Refer to dosing instructions on the bottle, but in general ibuprofen dose is 600-800mg and Tylenol dose is 500-1000mg. For most small procedures, this should be enough to keep you comfortable. Take these medications until your followup visit. You may have been given a small prescription for stronger pain medicine which will help relieve more severe pain.  Pain medicine may make you drowsy so please keep this in mind.  Do not drive while taking pain medicine.      When you go home, please keep your operated arm elevated at all times (above the level of your heart).  If you do this, your swelling will resolve more quickly and your pain will be improve more quickly as well. You may also place an ice pack over your dressing or splint to help with swelling and pain.    It is also very important to keep your fingers moving after most procedures, unless you had a tendon repair or fracture repair in which case you will be in a splint. Keep all of your fingers moving through a full range of motion to prevent stiffness.    For small hand procedures such as carpal tunnel release, trigger finger release, and cyst excision, the dressing that you have on your extremity should remain on for 3-4 days. It may then be removed, you can wash the incision  gently with soap and water, and keep the incision covered with a band-aid or similar clean dressing. For larger procedures or if you have a splint on, these should remain on until follow up or as specifically instructed. If you feel that your dressing is too tight during the first 3 days after surgery, you may loosen it. It is normal to see minor staining on the dressing after surgery. If there is significant bleeding, you are advised to call the office during regular office hours to have this checked.  Make sure that your dressing is kept dry at all times.  You can take a shower if you cover your arm with a plastic bag. If your dressing gets wet, replace it with sterile dressing that you can obtain from your local drug store.    Please call our office for a follow-up appointment, [phone]. Follow up after surgery is typically 10-14 days, unless you were specifically instructed otherwise. The sutures will be removed and you may be asked to see a hand therapist to optimize your functional result. Each of the hand therapists that you will be referred to have received special training in the care of the hand and upper extremity.    If you have questions regarding your surgery postop that you feel requires attention, please call the office at [phone] during business hours, or [phone] after hours for the answering service. If you feel that you have a surgical emergency postoperatively that requires immediate attention, please call the above numbers or go to the Emergency Department and ask for the Orthopedic Surgeon on call.

## 2022-03-06 ENCOUNTER — PATIENT MESSAGE (OUTPATIENT)
Dept: SLEEP MEDICINE | Facility: MEDICAL CENTER | Age: 57
End: 2022-03-06
Payer: COMMERCIAL

## 2022-03-07 NOTE — PROGRESS NOTES
Chief Complaint   Patient presents with   • Establish Care     Referred by Dr Hamm for Hilar Lymphadenopathy-Malignant neoplasm metastatic to lung   • Other     Pet/CT 02/09/22, echo 11/29/21       HPI:  The patient is a 56-year-old woman with a rather complex medical history.  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  During her evaluation at [facility] in [facility] she was found to have multiple pulmonary nodules.  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  At that time she was also noted to have evidence of large bilateral cysts on her chest CT.  There was no specific diagnosis of the pulmonary cystic disease.  She tells me at that time the physicians were uncertain about the etiology of her pulmonary cysts.  At that time she had a hysterectomy and NADER/BSO with lymph node dissection and partial vaginectomy.  She was treated with 6 cycles of carbo/Taxol and several cycles of rituximab for her BALT lymphoma.  Patient had been living in [place] but moved to [place] and has subsequently moved to this area.  She has been seen by oncology and GYN oncology at [facility].  She had recurrent pulmonary opacities and underwent bronchoscopy with lymph node fine-needle aspiration in September 2021 which was consistent with MALT lymphoma.  She had 4 subsequent treatments with rituximab.  Her complicated history is outlined by oncology notes and gynecologic oncology notes from [facility].  CT scan from 8/24/2021 was reviewed showing significant cystic lung disease involving primarily the right lung with an upper lobe predominance.  There are a few small cysts on the left side.  A PET scan from 2/9/2022 shows no significant FDG avidity in the lungs.  There is no change in the cystic lung disease and scattered nodules.  The patient tells me that she believes she had alpha 1  antitrypsin testing at [facility].  She has never smoker with no history of significant asthma or COPD.  She does have a history of Crohn's disease and a history of diabetes mellitus.  [place] has experienced some shortness of breath with activity.  She is not complaining of any significant cough or sputum production.  She says she does have some occasional wheezing.  She has no history of hemoptysis.  The patient is also complaining of some poor sleep quality with some daytime hypersomnolence.  Oxygen saturation at rest on room air was 96% and fell to 91% with ambulation.      Past Medical History:   Diagnosis Date   • Acquired hypothyroidism 6/14/2021   • Arthritis 02/2022    ankle-right   • Asthma 02/2022    inhaler for exercise induced -rarely uses   • Back pain    • Back pain    • Breath shortness 2019    lymphoma   • Cancer ([facility]) 2019    lymphoma-lungs-chemo immunotherapy   • Chickenpox    • COPD (chronic obstructive pulmonary disease) ([facility])    • Crohns disease    • Daytime sleepiness    • Diarrhea    • Difficulty breathing    • Essential hypertension 6/14/2021   • Fatigue    • GERD (gastroesophageal reflux disease)    • Heart burn    • Heartburn    • High cholesterol    • Hypothyroidism    • IBD (inflammatory bowel disease) 6/14/2021    Chron's   • Indigestion    • Influenza    • Insomnia    • Lung cancer ([facility])    • MALT lymphoma ([facility]) 6/14/2021   • Obesity    • Pulmonary emphysema ([facility])    • Pulmonary nodule    • Pulmonary nodules 6/14/2021   • Restless leg syndrome    • Shortness of breath    • Type 2 diabetes mellitus without complication, with long-term current use of insulin ([facility]) 6/14/2021   • Wears glasses    • Wheezing        ROS:   Constitutional: Denies fevers, chills, night sweats, fatigue or weight loss  Eyes: Denies vision loss, pain, drainage, double vision  Ears, Nose, Throat: Denies earache, tinnitus, hoarseness  Cardiovascular: Denies chest pain, tightness,  palpitations at this time  Respiratory: See HPI  Sleep: Possible sleep apnea  GI: Denies abdominal pain, nausea, vomiting, diarrhea  : Denies frequent urination, hematuria, painful urination  Musculoskeletal: Denies back pain, painful joints, sore muscles  Neurological: Denies headaches, seizures  Skin: Denies rashes, color changes  Psychiatric: Denies depression or thoughts of suicide  Hematologic: Denies bleeding tendency or clotting tendency  Allergic/Immunologic: Denies rhinitis, skin sensitivity    Social History     Socioeconomic History   • Marital status: Single     Spouse name: Not on file   • Number of children: Not on file   • Years of education: Not on file   • Highest education level: Not on file   Occupational History   • Not on file   Tobacco Use   • Smoking status: Never Smoker   • Smokeless tobacco: Never Used   Vaping Use   • Vaping Use: Never used   Substance and Sexual Activity   • Alcohol use: Yes     Alcohol/week: 0.0 oz     Comment: 1 to 2 drinks/year   • Drug use: Never   • Sexual activity: Not on file   Other Topics Concern   • Not on file   Social History Narrative    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      Social Determinants of Health     Financial Resource Strain: Not on file   Food Insecurity: Not on file   Transportation Needs: Not on file   Physical Activity: Not on file   Stress: Not on file   Social Connections: Not on file   Intimate Partner Violence: Not on file   Housing Stability: Not on file     Patient has no known allergies.  Current Outpatient Medications on File Prior to Visit   Medication Sig Dispense Refill   • oxyCODONE immediate-release (ROXICODONE) 5 MG Tab Take 1 Tablet by mouth every four hours as needed for Severe Pain for up to 5 days. 8 Tablet 0   • therapeutic multivitamin-minerals (THERAGRAN-M) Tab Take 1 Tablet by mouth every day.     • levothyroxine (SYNTHROID) 100 MCG Tab Take 1 Tablet by mouth every morning on an empty stomach.  "90 Tablet 2   • metformin (GLUCOPHAGE) 1000 MG tablet Take 1 Tablet by mouth 2 times a day with meals. 180 Tablet 2   • Insulin Lispro (HUMALOG KWIKPEN) 200 UNIT/ML Solution Pen-injector Inject 6-14 Units under the skin 4 Times a Day:  Before Meals and at Bedtime. 21 mL 1   • Blood Glucose Test Strips Use one One Touch Verio Flex strip to test blood sugar 4 times daily. 400 Strip 0   • atorvastatin (LIPITOR) 40 MG Tab Take 1 Tablet by mouth at bedtime. 90 Tablet 3   • ramipril (ALTACE) 2.5 MG Cap TAKE 1 CAPSULE DAILY 90 Capsule 3   • pantoprazole (PROTONIX) 20 MG tablet TAKE 1 TABLET DAILY 90 Tablet 3   • Dulaglutide (TRULICITY) 1.5 MG/0.5ML Solution Pen-injector Inject 0.5 mL under the skin every 7 days. 45 mL 1   • Insulin Degludec (TRESIBA FLEXTOUCH) 100 UNIT/ML Solution Pen-injector Inject 20 Units under the skin every morning. 1800 mL 1     No current facility-administered medications on file prior to visit.     /74 (BP Location: Right arm, Patient Position: Sitting, BP Cuff Size: Adult)   Pulse 70   Ht 1.676 m (5' 6\")   Wt 116 kg (256 lb)   SpO2 99%   Family History   Problem Relation Age of Onset   • No Known Problems Other    • Heart Disease Mother         rhuematic fever, valve replacement   • Breast Cancer Mother    • Heart Failure Father        Physical Exam:  No distress at rest on room air  HEENT: PERRLA, EOMI, no scleral icterus, no nasal or oral lesions  Neck: No thyromegaly, no adenopathy, no bruits  Mallampatti: Grade II  Lungs: Equal breath sounds, no wheezes or crackles  Heart: Regular rate and rhythm, no gallops or murmurs  Abdomen: Soft, benign, no organomegaly  Extremities: No clubbing, cyanosis, or edema  Neurologic: Cranial nerve, motor, and sensory exam are normal    1. Multiple idiopathic pulmonary cysts    2. Endometrioid adenocarcinoma of uterus (HCC)    3. MALT lymphoma (HCC)    4. Pulmonary nodules    5. DAVID (obstructive sleep apnea)    6. DM-2, Type 2 diabetes mellitus " without complication, with long-term current use of insulin (HCC)        This woman has endometrial adenocarcinoma that has been treated with surgery and chemotherapy.  She also has a coincidental MALT lymphoma involving her lungs which has been treated with rituximab.  She has rather atypical CT findings with  predominantly unilateral right-sided cystic lung disease of uncertain etiology.  She tells me that alpha-1 antitrypsin levels were normal when previously tested.  She is a good historian.  The pattern of her cystic disease does not appear to be BARROS or PLCH.  She is a never smoker.  There is no significant bronchiectasis and the pattern is very atypical for cystic fibrosis.  The pathology from her VATS was sent to [facility] for evaluation and I would assume that no evidence of LI P was noted on the biopsy specimen.  At this time I am and I loss to explain her significant cystic lung disease.  We will obtain pulmonary function testing as well as a home sleep study and see her back after the above.  She has been seen by a pulmonologist at [facility] but there was no definite discussion of her cystic lung disease.  Eventually she will need referral to [facility] for their opinion concerning diagnostic possibilities.  I would certainly not want to proceed with any surgical biopsy at this time.    She does have a history of scuba diving and I did advise against that activity.

## 2022-04-12 ENCOUNTER — HOME STUDY (OUTPATIENT)
Dept: SLEEP MEDICINE | Facility: MEDICAL CENTER | Age: 57
End: 2022-04-12
Attending: INTERNAL MEDICINE
Payer: COMMERCIAL

## 2022-04-12 DIAGNOSIS — G47.33 OSA (OBSTRUCTIVE SLEEP APNEA): ICD-10-CM

## 2022-04-12 PROCEDURE — 95806 SLEEP STUDY UNATT&RESP EFFT: CPT | Performed by: INTERNAL MEDICINE

## 2022-04-15 ENCOUNTER — HOSPITAL ENCOUNTER (OUTPATIENT)
Dept: RADIOLOGY | Facility: MEDICAL CENTER | Age: 57
End: 2022-04-15
Attending: INTERNAL MEDICINE
Payer: COMMERCIAL

## 2022-04-15 DIAGNOSIS — C88.4 MALT LYMPHOMA (HCC): ICD-10-CM

## 2022-04-18 ENCOUNTER — NON-PROVIDER VISIT (OUTPATIENT)
Dept: SLEEP MEDICINE | Facility: MEDICAL CENTER | Age: 57
End: 2022-04-18
Attending: INTERNAL MEDICINE
Payer: COMMERCIAL

## 2022-04-18 VITALS — WEIGHT: 253 LBS | BODY MASS INDEX: 40.66 KG/M2 | HEIGHT: 66 IN

## 2022-04-18 PROCEDURE — 94729 DIFFUSING CAPACITY: CPT | Performed by: INTERNAL MEDICINE

## 2022-04-18 PROCEDURE — 94060 EVALUATION OF WHEEZING: CPT | Performed by: INTERNAL MEDICINE

## 2022-04-18 PROCEDURE — 94726 PLETHYSMOGRAPHY LUNG VOLUMES: CPT | Performed by: INTERNAL MEDICINE

## 2022-04-18 ASSESSMENT — PULMONARY FUNCTION TESTS
FVC_LLN: 2.89
FEV1/FVC_PERCENT_CHANGE: 0
FEV1_LLN: 2.28
FVC_PERCENT_PREDICTED: 73
FEV1/FVC_PERCENT_PREDICTED: 79
FEV1/FVC_PREDICTED: 79
FEV1_PERCENT_CHANGE: 4
FEV1_PERCENT_CHANGE: 5
FVC_PERCENT_PREDICTED: 69
FEV1/FVC: 78
FEV1_PREDICTED: 2.73
FEV1/FVC_PERCENT_PREDICTED: 100
FVC: 2.54
FEV1/FVC: 79
FEV1/FVC_PERCENT_CHANGE: 125
FEV1/FVC_PERCENT_PREDICTED: 99
FEV1: 2
FEV1: 1.89
FEV1/FVC_PERCENT_PREDICTED: 98
FVC: 2.42
FEV1/FVC_PERCENT_LLN: 66
FEV1_PERCENT_PREDICTED: 73
FEV1/FVC_PERCENT_PREDICTED: 100
FVC_PREDICTED: 3.46
FEV1/FVC: 78
FEV1/FVC: 78.74
FEV1_PERCENT_PREDICTED: 69

## 2022-04-18 ASSESSMENT — FIBROSIS 4 INDEX: FIB4 SCORE: 1.82

## 2022-04-18 NOTE — PROCEDURES
Technician: VIKTORIA Chun    Technician Comment:  Good patient effort & cooperation.  The results of this test meet the ATS/ERS standards for acceptability & reproducibility.  Test was performed on the [employer] Body Plethysmograph-Elite DX system.  Predicted values were GLI-2012 for spirometry, GLI-2017 for DLCO, ITS for Lung Volumes.  The DLCO was uncorrected for Hgb.  A bronchodilator of Ventolin HFA -2puffs via spacer administered.  DLCO performed during dilation period.    Interpretation:  Baseline spirometry shows proportionate reduction in FVC at 2.42 L or 69% predicted and FEV1 of 1.89 L or 69% predicted with a normal FEV1/FVC ratio of 78.  There is trend toward bronchodilator response but does not meet ATS criteria.  Total lung capacity is within normal limits.  Diffusion capacity is within normal limits.  Pulmonary function testing shows nonspecific reduction in FEV1 and FVC that is proportionate and likely secondary to patient body habitus with a BMI of 41.

## 2022-04-18 NOTE — PROCEDURES
Interpretation:    This home sleep study was performed on 4/12/2022 using a ResMed ApneaLink Air type III device.  The total recording time duration was 9 hours 13 minutes, the monitoring time (flow) duration was 1 hour 48 minutes, and the oxygen saturation evaluation duration was 1 hour 42 minutes.  The overall ELIZABETH was 23.1, the supine ELIZABETH was 20.5, the nonsupine ELIZABETH was 42.1, and the upright ELIZABETH was 15.5.  The total number of apneas and hypopneas was 42.  All the events were hypopneas.  The Cheyne- Frank respiration time was 0 minutes and the percentage was 0%.  The oxygen desaturation index was 25.8 and the patient experienced 44 total desaturations.    The baseline oxygen saturation was 98%, the average oxygen saturation was 93%, and the lowest oxygen saturation was 85%.  The saturations were less than or equal to 88% for 4% or for 04 minutes. The patient experienced 1646 breaths or 15.1 breaths per minute.  50 snores were recorded. The minimum heart rate was 78, the average heart rate was 86, and the maximum heart rate was 99 beats per minute.        Assessment:    Moderate obstructive sleep apnea hypopnea - ELIZABETH 23.1  Mild nocturnal desaturation - brittani saturation 85% - less than or equal to 88% fo for % of the TIB        Recommendation:    Recommend a positive airway pressure titration  Clinical correlation is needed.  An empiric trial of auto titrating CPAP may be an acceptable option.          Dr. Saravanan Dickerson M.D.

## 2022-04-28 ENCOUNTER — PATIENT MESSAGE (OUTPATIENT)
Dept: SLEEP MEDICINE | Facility: MEDICAL CENTER | Age: 57
End: 2022-04-28
Payer: COMMERCIAL

## 2022-04-29 ENCOUNTER — HOSPITAL ENCOUNTER (OUTPATIENT)
Dept: RADIOLOGY | Facility: MEDICAL CENTER | Age: 57
End: 2022-04-29
Attending: INTERNAL MEDICINE
Payer: COMMERCIAL

## 2022-04-29 PROCEDURE — A9552 F18 FDG: HCPCS

## 2022-05-01 ENCOUNTER — OFFICE VISIT (OUTPATIENT)
Dept: URGENT CARE | Facility: PHYSICIAN GROUP | Age: 57
End: 2022-05-01
Payer: COMMERCIAL

## 2022-05-01 VITALS
RESPIRATION RATE: 16 BRPM | DIASTOLIC BLOOD PRESSURE: 90 MMHG | HEART RATE: 104 BPM | TEMPERATURE: 97.6 F | OXYGEN SATURATION: 94 % | HEIGHT: 66 IN | WEIGHT: 255 LBS | BODY MASS INDEX: 40.98 KG/M2 | SYSTOLIC BLOOD PRESSURE: 142 MMHG

## 2022-05-01 DIAGNOSIS — M54.50 ACUTE LOW BACK PAIN WITHOUT SCIATICA, UNSPECIFIED BACK PAIN LATERALITY: ICD-10-CM

## 2022-05-01 PROCEDURE — 99214 OFFICE O/P EST MOD 30 MIN: CPT | Performed by: FAMILY MEDICINE

## 2022-05-01 RX ORDER — METHOCARBAMOL 750 MG/1
TABLET, FILM COATED ORAL
Qty: 40 TABLET | Refills: 0 | Status: SHIPPED | OUTPATIENT
Start: 2022-05-01 | End: 2022-08-11

## 2022-05-01 RX ORDER — HYDROCODONE BITARTRATE AND ACETAMINOPHEN 5; 325 MG/1; MG/1
TABLET ORAL
Qty: 15 TABLET | Refills: 0 | Status: SHIPPED | OUTPATIENT
Start: 2022-05-01 | End: 2022-05-05

## 2022-05-01 RX ORDER — BLOOD SUGAR DIAGNOSTIC
STRIP MISCELLANEOUS
COMMUNITY
Start: 2022-02-08

## 2022-05-01 RX ORDER — KETOROLAC TROMETHAMINE 30 MG/ML
60 INJECTION, SOLUTION INTRAMUSCULAR; INTRAVENOUS ONCE
Status: COMPLETED | OUTPATIENT
Start: 2022-05-01 | End: 2022-05-01

## 2022-05-01 RX ORDER — COVID-19 MOLECULAR TEST ASSAY
KIT MISCELLANEOUS
COMMUNITY
Start: 2022-02-27 | End: 2022-05-03

## 2022-05-01 RX ADMIN — KETOROLAC TROMETHAMINE 60 MG: 30 INJECTION, SOLUTION INTRAMUSCULAR; INTRAVENOUS at 14:01

## 2022-05-01 ASSESSMENT — FIBROSIS 4 INDEX: FIB4 SCORE: 1.82

## 2022-05-01 NOTE — PROGRESS NOTES
Chief Complaint:    Chief Complaint   Patient presents with   • Low Back Pain     Pulled a muscle yesterday        History of Present Illness:    Picked up something yesterday causing pain in lower back. Pain is much worse with lumbar flexion. No radiating pain down legs. No loss of bowel/bladder control. Advil has not helped. Requesting Hydrocodone pain medication.      Past Medical History:    Past Medical History:   Diagnosis Date   • Acquired hypothyroidism 6/14/2021   • Arthritis 02/2022    ankle-right   • Asthma 02/2022    inhaler for exercise induced -rarely uses   • Back pain    • Back pain    • Breath shortness 2019    lymphoma   • Cancer (HCC) 2019    lymphoma-lungs-chemo immunotherapy   • Chickenpox    • COPD (chronic obstructive pulmonary disease) (Formerly [facility])    • Crohns disease    • Daytime sleepiness    • Diarrhea    • Difficulty breathing    • Essential hypertension 6/14/2021   • Fatigue    • GERD (gastroesophageal reflux disease)    • Heart burn    • Heartburn    • High cholesterol    • Hypothyroidism    • IBD (inflammatory bowel disease) 6/14/2021    Chron's   • Indigestion    • Influenza    • Insomnia    • Lung cancer ([facility])    • MALT lymphoma ([facility]) 6/14/2021   • Obesity    • Pulmonary emphysema ([facility])    • Pulmonary nodule    • Pulmonary nodules 6/14/2021   • Restless leg syndrome    • Shortness of breath    • Type 2 diabetes mellitus without complication, with long-term current use of insulin (Former[facility]) 6/14/2021   • Wears glasses    • Wheezing      Past Surgical History:    Past Surgical History:   Procedure Laterality Date   • PB PALMAR FASCIECTOMY Left 3/3/2022    Procedure: LEFT INDEX FINGER FASCIECTOMY;  Surgeon: Brien Villagomez M.D.;  Location: SURGERY SAME DAY [facility];  Service: Orthopedics   • GASTRIC BYPASS LAPAROSCOPIC  2016    sleeve   • ABDOMINAL HYSTERECTOMY TOTAL      NADER + SBO   • BRONCHOSCOPY     • CARPAL TUNNEL RELEASE     • CHOLECYSTECTOMY     • LUNG BIOPSY OPEN     • SLEEVE,JESSICA VASO THIGH        Social History:    Social History     Socioeconomic History   • Marital status: Single     Spouse name: Not on file   • Number of children: Not on file   • Years of education: Not on file   • Highest education level: Not on file   Occupational History   • Not on file   Tobacco Use   • Smoking status: Never Smoker   • Smokeless tobacco: Never Used   Vaping Use   • Vaping Use: Never used   Substance and Sexual Activity   • Alcohol use: Yes     Alcohol/week: 0.0 oz     Comment: 1 to 2 drinks/year   • Drug use: Never   • Sexual activity: Not on file   Other Topics Concern   • Not on file   Social History Narrative    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      Social Determinants of Health     Financial Resource Strain: Not on file   Food Insecurity: Not on file   Transportation Needs: Not on file   Physical Activity: Not on file   Stress: Not on file   Social Connections: Not on file   Intimate Partner Violence: Not on file   Housing Stability: Not on file     Family History:    Family History   Problem Relation Age of Onset   • No Known Problems Other    • Heart Disease Mother         rhuematic fever, valve replacement   • Breast Cancer Mother    • Heart Failure Father      Medications:    Current Outpatient Medications on File Prior to Visit   Medication Sig Dispense Refill   • ID NOW COVID-19 Kit TEST AS DIRECTED TODAY     • ONETOUCH VERIO strip      • therapeutic multivitamin-minerals (THERAGRAN-M) Tab Take 1 Tablet by mouth every day.     • levothyroxine (SYNTHROID) 100 MCG Tab Take 1 Tablet by mouth every morning on an empty stomach. 90 Tablet 2   • metformin (GLUCOPHAGE) 1000 MG tablet Take 1 Tablet by mouth 2 times a day with meals. 180 Tablet 2   • Insulin Lispro (HUMALOG KWIKPEN) 200 UNIT/ML Solution Pen-injector Inject 6-14 Units under the skin 4 Times a Day:  Before Meals and at Bedtime. 21 mL 1   • Blood Glucose Test Strips Use one One Touch Verio Flex strip to test blood sugar  "4 times daily. 400 Strip 0   • atorvastatin (LIPITOR) 40 MG Tab Take 1 Tablet by mouth at bedtime. 90 Tablet 3   • ramipril (ALTACE) 2.5 MG Cap TAKE 1 CAPSULE DAILY 90 Capsule 3   • pantoprazole (PROTONIX) 20 MG tablet TAKE 1 TABLET DAILY 90 Tablet 3   • Dulaglutide (TRULICITY) 1.5 MG/0.5ML Solution Pen-injector Inject 0.5 mL under the skin every 7 days. 45 mL 1   • Insulin Degludec (TRESIBA FLEXTOUCH) 100 UNIT/ML Solution Pen-injector Inject 20 Units under the skin every morning. 1800 mL 1     No current facility-administered medications on file prior to visit.     Allergies:    No Known Allergies      Vitals:    Vitals:    22 1317   BP: 142/90   Pulse: (!) 104   Resp: 16   Temp: 36.4 °C (97.6 °F)   TempSrc: Temporal   SpO2: 94%   Weight: 116 kg (255 lb)   Height: 1.676 m (5' 6\")       Physical Exam:    Constitutional: Vital signs reviewed. Appears well-developed and well-nourished. No acute distress.   Eyes: Sclera white, conjunctivae clear.  ENT: External ears normal. Hearing normal.  Pulmonary/Chest: Respirations non-labored.  Musculoskeletal: Tender to palpation midline and left lower back. Severely decreased lumbar flexion and moderately decreased lumbar extension due to pain.  Neurological: Alert and oriented to person, place, and time. Muscle tone normal. Coordination normal. Light touch and sensation normal.   Skin: No rashes or lesions. Warm, dry, normal turgor.  Psychiatric: Normal mood and affect. Behavior is normal. Judgment and thought content normal.       Medical Decision Makin. Acute low back pain without sciatica, unspecified back pain laterality  - ketorolac (TORADOL) injection 60 mg  - methocarbamol (ROBAXIN) 750 MG Tab; 1 TAB BY MOUTH EVERY 6 HOURS ONLY IF NEEDED FOR PAIN, MUSCLE SPASM, AND/OR MUSCLE TIGHTNESS. MAY CAUSE DROWSINESS.  Dispense: 40 Tablet; Refill: 0  - HYDROcodone-acetaminophen (NORCO) 5-325 MG Tab per tablet; 1 TAB BY MOUTH EVERY 6 HOURS ONLY IF NEEDED FOR PAIN FOR " UP TO 4 DAYS. MAY CAUSE DROWSINESS.  Dispense: 15 Tablet; Refill: 0      Discussed with her DDX, management options, and risks, benefits, and alternatives to treatment plan agreed upon.    Picked up something yesterday causing pain in lower back. Pain is much worse with lumbar flexion. No radiating pain down legs. No loss of bowel/bladder control. Advil has not helped. Requesting Hydrocodone pain medication.    Tender to palpation midline and left lower back. Severely decreased lumbar flexion and moderately decreased lumbar extension due to pain.    Likely MSK inflammation and/or muscle tightness/spasm as cause of symptoms.     Rec'd relative rest.    Agreeable to potent anti-inflammatory treatment, muscle relaxer, and pain medication with medications given and prescribed.  report checked - last Rx was Oxycodone 5 mg #8 on 3/3/22.    In my professional judgment, after considering each of the factors set forth in , the CS is medically necessary and appropriate.    Complicated injury due to Toradol IM given today and narcotic pain medication prescribed today.    Discussed expected course of duration, time for improvement, and to seek follow-up in Emergency Room, urgent care, or with PCP if getting worse at any time or not improving within expected time frame.

## 2022-05-02 NOTE — PROGRESS NOTES
"Chief Complaint   Patient presents with   • Dyslipidemia   • Hypertension          Subjective:   Stefania Serrano is a 57 y.o. female who presents today for follow-up.    Patient of Dr. Ocampo.  Current medical problems include endometrial adenocarcinoma and lymphoma s/p surgical resection and 6 cycles of carbo/Taxol, rituximab for lymphoma, poorly diabetes.     Stefania was referred to Dr. Ocampo for dyspnea and palpitations. She had an echocardiogram which showed normal left ventricular size wall thickness systolic and diastolic function, EF 65%.  No valvular disease.  Normal IVC and inspiratory collapse.    Stefania describes very brief heart beat stutters, \"glug glug\", that occur maybe twice per day. They last about 1 sec. She does not have associated presyncope feelings.     Stefania is very limited by her dyspnea which has been an ongoing problem for her due to emphysema and her complex lung disease from lymphoma which is followed by Dr. Lombardo locally and with [facility] oncology. She can walk up 2 flights of stairs but does it slowly. She does not have chest pressure with exertion, just shortness of breath. She also was recently diagnosed with sleep apnea.      Past Medical History:   Diagnosis Date   • Acquired hypothyroidism 6/14/2021   • Arthritis 02/2022    ankle-right   • Asthma 02/2022    inhaler for exercise induced -rarely uses   • Back pain    • Back pain    • Breath shortness 2019    lymphoma   • Cancer (HCC) 2019    lymphoma-lungs-chemo immunotherapy   • Chickenpox    • COPD (chronic obstructive pulmonary disease) (HCC)    • Crohns disease    • Daytime sleepiness    • Diarrhea    • Difficulty breathing    • Essential hypertension 6/14/2021   • Fatigue    • GERD (gastroesophageal reflux disease)    • Heart burn    • Heartburn    • High cholesterol    • Hypothyroidism    • IBD (inflammatory bowel disease) 6/14/2021    Chron's   • Indigestion    • Influenza    • Insomnia    • Lung cancer (HCC)    • MALT lymphoma (HCC) 6/14/2021 "   • Obesity    • Pulmonary emphysema (HCC)    • Pulmonary nodule    • Pulmonary nodules 6/14/2021   • Restless leg syndrome    • Shortness of breath    • Type 2 diabetes mellitus without complication, with long-term current use of insulin (HCC) 6/14/2021   • Wears glasses    • Wheezing          Past Surgical History:   Procedure Laterality Date   • PB PALMAR FASCIECTOMY Left 3/3/2022    Procedure: LEFT INDEX FINGER FASCIECTOMY;  Surgeon: Brien Villagomez M.D.;  Location: SURGERY SAME DAY [facility];  Service: Orthopedics   • GASTRIC BYPASS LAPAROSCOPIC  2016    sleeve   • ABDOMINAL HYSTERECTOMY TOTAL      NADER + SBO   • BRONCHOSCOPY     • CARPAL TUNNEL RELEASE     • CHOLECYSTECTOMY     • LUNG BIOPSY OPEN     • SLEEVE,JESSICA VASO THIGH           Family History   Problem Relation Age of Onset   • No Known Problems Other    • Heart Disease Mother         rhuematic fever, valve replacement   • Breast Cancer Mother    • Heart Failure Father          Social History     Tobacco Use   Smoking Status Never Smoker   Smokeless Tobacco Never Used          Social History     Substance and Sexual Activity   Alcohol Use Yes   • Alcohol/week: 0.0 oz    Comment: 1 to 2 drinks/year         No Known Allergies      Outpatient Encounter Medications as of 5/3/2022   Medication Sig Dispense Refill   • ONETOUCH VERIO strip      • methocarbamol (ROBAXIN) 750 MG Tab 1 TAB BY MOUTH EVERY 6 HOURS ONLY IF NEEDED FOR PAIN, MUSCLE SPASM, AND/OR MUSCLE TIGHTNESS. MAY CAUSE DROWSINESS. 40 Tablet 0   • HYDROcodone-acetaminophen (NORCO) 5-325 MG Tab per tablet 1 TAB BY MOUTH EVERY 6 HOURS ONLY IF NEEDED FOR PAIN FOR UP TO 4 DAYS. MAY CAUSE DROWSINESS. 15 Tablet 0   • therapeutic multivitamin-minerals (THERAGRAN-M) Tab Take 1 Tablet by mouth every day.     • levothyroxine (SYNTHROID) 100 MCG Tab Take 1 Tablet by mouth every morning on an empty stomach. 90 Tablet 2   • metformin (GLUCOPHAGE) 1000 MG tablet Take 1 Tablet by mouth 2 times a day with meals. 180  "Tablet 2   • Insulin Lispro (HUMALOG KWIKPEN) 200 UNIT/ML Solution Pen-injector Inject 6-14 Units under the skin 4 Times a Day:  Before Meals and at Bedtime. 21 mL 1   • Blood Glucose Test Strips Use one One Touch Verio Flex strip to test blood sugar 4 times daily. 400 Strip 0   • atorvastatin (LIPITOR) 40 MG Tab Take 1 Tablet by mouth at bedtime. 90 Tablet 3   • ramipril (ALTACE) 2.5 MG Cap TAKE 1 CAPSULE DAILY 90 Capsule 3   • pantoprazole (PROTONIX) 20 MG tablet TAKE 1 TABLET DAILY 90 Tablet 3   • Dulaglutide (TRULICITY) 1.5 MG/0.5ML Solution Pen-injector Inject 0.5 mL under the skin every 7 days. 45 mL 1   • Insulin Degludec (TRESIBA FLEXTOUCH) 100 UNIT/ML Solution Pen-injector Inject 20 Units under the skin every morning. 1800 mL 1   • [DISCONTINUED] ID NOW COVID-19 Kit TEST AS DIRECTED TODAY (Patient not taking: Reported on 5/3/2022)       No facility-administered encounter medications on file as of 5/3/2022.         Review of Systems   Constitutional: Positive for malaise/fatigue.   Respiratory: Positive for shortness of breath. Negative for cough and wheezing.    Cardiovascular: Positive for palpitations and orthopnea. Negative for chest pain and leg swelling.   Gastrointestinal: Positive for diarrhea and heartburn.   Neurological: Negative for dizziness.   Endo/Heme/Allergies: Positive for environmental allergies. Does not bruise/bleed easily.   Psychiatric/Behavioral: The patient has insomnia.           Objective:   /68 (BP Location: Left arm, Patient Position: Sitting, BP Cuff Size: Adult)   Pulse 81   Resp 16   Ht 1.676 m (5' 6\")   Wt 115 kg (254 lb)   SpO2 95%   BMI 41.00 kg/m²        Physical Exam  Vitals and nursing note reviewed.   Constitutional:       General: She is not in acute distress.     Appearance: Normal appearance. She is obese.      Comments: BMI 41   HENT:      Head: Normocephalic and atraumatic.   Cardiovascular:      Rate and Rhythm: Normal rate and regular rhythm.      " Pulses: Normal pulses.      Heart sounds: No murmur heard.  Pulmonary:      Effort: Pulmonary effort is normal. No respiratory distress.      Comments: diminished breath sounds bilaterally without rales or wheezes  Abdominal:      General: There is no distension.      Palpations: Abdomen is soft.   Musculoskeletal:      Right lower leg: No edema.      Left lower leg: No edema.   Skin:     General: Skin is warm and dry.      Capillary Refill: Capillary refill takes less than 2 seconds.   Neurological:      Mental Status: She is alert.      Gait: Gait normal.   Psychiatric:         Judgment: Judgment normal.          TTE 11/29/21  CONCLUSIONS  No prior study is available for comparison.   Normal left ventricular size, wall thickness, diastolic and systolic   function.  Left ventricular ejection fraction is estimated to be 65%.  Normal right ventricular size and systolic function.  No hemodynamically significant valvular heart disease noted.  Normal inferior vena cava size and inspiratory collapse.  Assessment:     1. Dyslipidemia     2. Dyspnea on exertion     3. Type 2 diabetes mellitus without complication, with long-term current use of insulin (HCC)          Medical Decision Making:  Today's Assessment / Plan:   Palpitations  - symptoms are brief and likely consistent with ectopy, agreed to monitor symptoms without treatment for now    Primary Prevention  DM type 2  - cont Atorvastatin and DM control with her PCP  - she does achieve 4 METs without anginal symptoms other than dyspnea in the setting of her well documented lung disease. Noncon nongated CT without coronary calcium. Her EKG and echo are normal. If UCD requests stress testing prior to VATS would recommend a dobutamine echo stress test.     DAVID  - follow up with pulm med 6/7     Follow up as needed with cardiology

## 2022-05-03 ENCOUNTER — OFFICE VISIT (OUTPATIENT)
Dept: CARDIOLOGY | Facility: MEDICAL CENTER | Age: 57
End: 2022-05-03
Payer: COMMERCIAL

## 2022-05-03 VITALS
SYSTOLIC BLOOD PRESSURE: 118 MMHG | HEIGHT: 66 IN | WEIGHT: 254 LBS | RESPIRATION RATE: 16 BRPM | HEART RATE: 81 BPM | BODY MASS INDEX: 40.82 KG/M2 | OXYGEN SATURATION: 95 % | DIASTOLIC BLOOD PRESSURE: 68 MMHG

## 2022-05-03 DIAGNOSIS — R06.09 DYSPNEA ON EXERTION: ICD-10-CM

## 2022-05-03 DIAGNOSIS — E11.9 TYPE 2 DIABETES MELLITUS WITHOUT COMPLICATION, WITH LONG-TERM CURRENT USE OF INSULIN (HCC): ICD-10-CM

## 2022-05-03 DIAGNOSIS — Z79.4 TYPE 2 DIABETES MELLITUS WITHOUT COMPLICATION, WITH LONG-TERM CURRENT USE OF INSULIN (HCC): ICD-10-CM

## 2022-05-03 DIAGNOSIS — E78.5 DYSLIPIDEMIA: ICD-10-CM

## 2022-05-03 PROCEDURE — 99213 OFFICE O/P EST LOW 20 MIN: CPT | Performed by: PHYSICIAN ASSISTANT

## 2022-05-03 ASSESSMENT — ENCOUNTER SYMPTOMS
DIARRHEA: 1
BRUISES/BLEEDS EASILY: 0
SHORTNESS OF BREATH: 1
WHEEZING: 0
HEARTBURN: 1
COUGH: 0
ORTHOPNEA: 1
INSOMNIA: 1
PALPITATIONS: 1
DIZZINESS: 0

## 2022-05-03 ASSESSMENT — FIBROSIS 4 INDEX: FIB4 SCORE: 1.82

## 2022-05-05 ENCOUNTER — OFFICE VISIT (OUTPATIENT)
Dept: URGENT CARE | Facility: PHYSICIAN GROUP | Age: 57
End: 2022-05-05
Payer: COMMERCIAL

## 2022-05-05 VITALS
BODY MASS INDEX: 43.36 KG/M2 | HEART RATE: 74 BPM | DIASTOLIC BLOOD PRESSURE: 74 MMHG | RESPIRATION RATE: 14 BRPM | OXYGEN SATURATION: 96 % | WEIGHT: 254 LBS | SYSTOLIC BLOOD PRESSURE: 114 MMHG | TEMPERATURE: 97.3 F | HEIGHT: 64 IN

## 2022-05-05 DIAGNOSIS — M54.50 ACUTE LEFT-SIDED LOW BACK PAIN WITHOUT SCIATICA: ICD-10-CM

## 2022-05-05 PROCEDURE — 99214 OFFICE O/P EST MOD 30 MIN: CPT | Mod: 25 | Performed by: STUDENT IN AN ORGANIZED HEALTH CARE EDUCATION/TRAINING PROGRAM

## 2022-05-05 RX ORDER — LIDOCAINE 4 G/G
PATCH TOPICAL
Qty: 15 PATCH | Refills: 0 | Status: SHIPPED | OUTPATIENT
Start: 2022-05-05 | End: 2023-04-10

## 2022-05-05 RX ORDER — KETOROLAC TROMETHAMINE 30 MG/ML
30 INJECTION, SOLUTION INTRAMUSCULAR; INTRAVENOUS ONCE
Status: COMPLETED | OUTPATIENT
Start: 2022-05-05 | End: 2022-05-05

## 2022-05-05 RX ORDER — IBUPROFEN 800 MG/1
800 TABLET ORAL EVERY 8 HOURS PRN
Qty: 30 TABLET | Refills: 0 | Status: SHIPPED | OUTPATIENT
Start: 2022-05-05 | End: 2022-08-11

## 2022-05-05 RX ADMIN — KETOROLAC TROMETHAMINE 30 MG: 30 INJECTION, SOLUTION INTRAMUSCULAR; INTRAVENOUS at 17:57

## 2022-05-05 ASSESSMENT — FIBROSIS 4 INDEX: FIB4 SCORE: 1.82

## 2022-05-06 NOTE — PROGRESS NOTES
"Subjective:   CHIEF COMPLAINT  Chief Complaint   Patient presents with   • Low Back Pain     Pt states she \"does not believe its a UTI\"       HPI  Jaimee Serrano is a 57 y.o. female who presents with a chief complaint of left lower back pain and is requesting a shot of Toradol.  On 4/30/2022 the patient was bending over picking up an object and developed abrupt back pain.  She was seen in urgent care given a shot of Toradol which provided relief of symptoms.  She is also given Norco and Robaxin which have not helped.  She has not taken any oral NSAIDs.  Says symptoms are approximately 5% better but the Toradol provided significant amount of relief albeit transiently.  Symptoms are worse with getting up from a seated position and with walking.  No radiculopathy.  No spontaneous loss of bowels or bladder.  No saddle anesthesia.  No dysuria or hematuria.    REVIEW OF SYSTEMS  General: no fever or chills  GI: no nausea or vomiting  See HPI for further details.    PAST MEDICAL HISTORY   has a past medical history of Acquired hypothyroidism (6/14/2021), Arthritis (02/2022), Asthma (02/2022), Back pain, Back pain, Breath shortness (2019), Cancer (Formerly [facility]) (2019), Chickenpox, COPD (chronic obstructive pulmonary disease) (Formerly [facility]), Crohns disease, Daytime sleepiness, Diarrhea, Difficulty breathing, Essential hypertension (6/14/2021), Fatigue, GERD (gastroesophageal reflux disease), Heart burn, Heartburn, High cholesterol, Hypothyroidism, IBD (inflammatory bowel disease) (6/14/2021), Indigestion, Influenza, Insomnia, Lung cancer (Formerly [facility]), MALT lymphoma (Formerly [facility]) (6/14/2021), Obesity, Pulmonary emphysema (Formerly [facility]), Pulmonary nodule, Pulmonary nodules (6/14/2021), Restless leg syndrome, Shortness of breath, Type 2 diabetes mellitus without complication, with long-term current use of insulin (Formerly [facility]) (6/14/2021), Wears glasses, and Wheezing.    SURGICAL HISTORY   has a past surgical history that includes abdominal hysterectomy total; " "cholecystectomy; gastric bypass laparoscopic (2016); Sleeve,Jesus Vaso Thigh; bronchoscopy; lung biopsy open; carpal tunnel release; and palmar fasciectomy (Left, 3/3/2022).    ALLERGIES  No Known Allergies    CURRENT MEDICATIONS  Home Medications     Reviewed by Ole Nuñez'doroteo (Medical Assistant) on 05/05/22 at 1646  Med List Status: <None>   Medication Last Dose Status   atorvastatin (LIPITOR) 40 MG Tab Taking Active   Blood Glucose Test Strips Taking Active   Dulaglutide (TRULICITY) 1.5 MG/0.5ML Solution Pen-injector Taking Active   HYDROcodone-acetaminophen (NORCO) 5-325 MG Tab per tablet PRN Active   Insulin Degludec (TRESIBA FLEXTOUCH) 100 UNIT/ML Solution Pen-injector Taking Active   Insulin Lispro (HUMALOG KWIKPEN) 200 UNIT/ML Solution Pen-injector Taking Active   levothyroxine (SYNTHROID) 100 MCG Tab Taking Active   metformin (GLUCOPHAGE) 1000 MG tablet Taking Active   methocarbamol (ROBAXIN) 750 MG Tab PRN Active   ONETOUCH VERIO strip Taking Active   pantoprazole (PROTONIX) 20 MG tablet Taking Active   ramipril (ALTACE) 2.5 MG Cap Taking Active   therapeutic multivitamin-minerals (THERAGRAN-M) Tab Taking Active                SOCIAL HISTORY  Social History     Tobacco Use   • Smoking status: Never Smoker   • Smokeless tobacco: Never Used   Vaping Use   • Vaping Use: Never used   Substance and Sexual Activity   • Alcohol use: Yes     Alcohol/week: 0.0 oz     Comment: 1 to 2 drinks/year   • Drug use: Never   • Sexual activity: Not on file       FAMILY HISTORY  Family History   Problem Relation Age of Onset   • No Known Problems Other    • Heart Disease Mother         rhuematic fever, valve replacement   • Breast Cancer Mother    • Heart Failure Father           Objective:   PHYSICAL EXAM  VITAL SIGNS: /74 (BP Location: Left arm, Patient Position: Sitting, BP Cuff Size: Adult)   Pulse 74   Temp 36.3 °C (97.3 °F)   Resp 14   Ht 1.626 m (5' 4\")   Wt 115 kg (254 lb)   SpO2 96%   BMI 43.60 " kg/m²     Gen: no acute distress, normal voice  Psych: normal affect, normal judgement, alert, awake  Skin: dry, intact, moist mucosal membranes  Lungs: CTAB w/ symmetric expansion  CV: RRR w/o murmurs or clicks  MSK: Limited ROM secondary to discomfort.  Minimal TTP along the left paraspinal muscles.  No midline TTP.  Distal sensation and motor fully intact.    Assessment/Plan:     1. Acute left-sided low back pain without sciatica  ketorolac (TORADOL) injection 30 mg    ibuprofen (MOTRIN) 800 MG Tab    Lidocaine 4 % Patch   Seen in urgent care 4 days ago and had relief after given a shot of Toradol; she is requesting additional shot today which is reasonable.  She has not been taking any oral NSAIDs.  - Toradol 30 mg IM x1 in the clinic given.  No additional NSAIDs until tomorrow.  Side effects were discussed  - Ordered Rx for ibuprofen 800 tid prn.  Take with food  -Ordered topical lidocaine patch  - Encouraged ambulation and activity as tolerated  - Instructed follow-up with her PCP for reevaluation continue management.  Recommended possible discussion of referral to physical therapy  -Return to urgent care any new/worsening symptoms or further questions or concerns.  Patient understood everything discussed.  All questions were answered.    Differential diagnosis, natural history, supportive care, and indications for immediate follow-up discussed. All questions answered. Patient agrees with the plan of care.    Follow-up as needed if symptoms worsen or fail to improve to PCP, Urgent care or Emergency Room.      Please note that this dictation was created using voice recognition software. I have made a reasonable attempt to correct obvious errors, but I expect that there are errors of grammar and possibly content that I did not discover before finalizing the note.

## 2022-06-07 ENCOUNTER — OFFICE VISIT (OUTPATIENT)
Dept: SLEEP MEDICINE | Facility: MEDICAL CENTER | Age: 57
End: 2022-06-07
Payer: COMMERCIAL

## 2022-06-07 VITALS
DIASTOLIC BLOOD PRESSURE: 82 MMHG | HEART RATE: 98 BPM | SYSTOLIC BLOOD PRESSURE: 122 MMHG | OXYGEN SATURATION: 93 % | BODY MASS INDEX: 40.5 KG/M2 | WEIGHT: 252 LBS | HEIGHT: 66 IN

## 2022-06-07 DIAGNOSIS — R06.02 SOB (SHORTNESS OF BREATH): ICD-10-CM

## 2022-06-07 DIAGNOSIS — G47.33 OSA (OBSTRUCTIVE SLEEP APNEA): ICD-10-CM

## 2022-06-07 DIAGNOSIS — R09.02 HYPOXEMIA: ICD-10-CM

## 2022-06-07 DIAGNOSIS — Z79.4 TYPE 2 DIABETES MELLITUS WITHOUT COMPLICATION, WITH LONG-TERM CURRENT USE OF INSULIN (HCC): ICD-10-CM

## 2022-06-07 DIAGNOSIS — C88.4 MALT LYMPHOMA (HCC): ICD-10-CM

## 2022-06-07 DIAGNOSIS — E11.9 TYPE 2 DIABETES MELLITUS WITHOUT COMPLICATION, WITH LONG-TERM CURRENT USE OF INSULIN (HCC): ICD-10-CM

## 2022-06-07 DIAGNOSIS — J98.4 MULTIPLE IDIOPATHIC PULMONARY CYSTS: ICD-10-CM

## 2022-06-07 DIAGNOSIS — C55 ENDOMETRIOID ADENOCARCINOMA OF UTERUS (HCC): ICD-10-CM

## 2022-06-07 DIAGNOSIS — R91.8 PULMONARY NODULES: ICD-10-CM

## 2022-06-07 PROCEDURE — 99214 OFFICE O/P EST MOD 30 MIN: CPT | Performed by: INTERNAL MEDICINE

## 2022-06-07 ASSESSMENT — FIBROSIS 4 INDEX: FIB4 SCORE: 1.82

## 2022-06-07 NOTE — LETTER
Alfredo Lombardo M.D.  [facility] Pulmonary Medicine   [address] [address]  Phone: [phone] - Fax:             Encounter Date: 6/7/2022  Provider: Alfredo Lombardo M.D.  Location of Care: [facility]  [address] [facility] [place] [address]      Patient:   Jaimee Serrano   MR Number: [id number]   YOB: 1965     PROGRESS NOTE:  Chief Complaint   Patient presents with   • Results     HST Results.       HPI:  The patient is a 56-year-old woman with a rather complex medical history.  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  During her evaluation at [facility] in [facility] she was found to have multiple pulmonary nodules.  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  At that time she was also noted to have evidence of large bilateral cysts on her chest CT.  There was no specific diagnosis of the pulmonary cystic disease.  She tells me at that time the physicians were uncertain about the etiology of her pulmonary cysts.  At that time she had a hysterectomy and NADER/BSO with lymph node dissection and partial vaginectomy.  She was treated with 6 cycles of carbo/Taxol and several cycles of rituximab for her BALT lymphoma.  Patient had been living in [place] but moved to [place] and has subsequently moved to this area.  She has been seen by oncology and GYN oncology at [facility].  She had recurrent pulmonary opacities and underwent bronchoscopy with lymph node fine-needle aspiration in September 2021 which was consistent with MALT lymphoma.  She had 4 subsequent treatments with rituximab.  Her complicated history is outlined by oncology notes and gynecologic oncology notes from [facility].  CT scan from 8/24/2021 was reviewed showing significant cystic lung disease  involving primarily the right lung with an upper lobe predominance.  There are a few small cysts on the left side.  A PET scan from 2/9/2022 shows no significant FDG avidity in the lungs.  There is no change in the cystic lung disease and scattered nodules.  The patient tells me that she believes she had alpha 1 antitrypsin testing at [facility].  She has never smoker with no history of significant asthma or COPD.  She does have a history of Crohn's disease and a history of diabetes mellitus.  [place] she has experienced some shortness of breath with activity.  She is not complaining of any significant cough or sputum production.  She says she does have some occasional wheezing.  She has no history of hemoptysis.  The patient is also complaining of some poor sleep quality with some daytime hypersomnolence.  Oxygen saturation at rest on room air was 96% and fell to 91% with ambulation.      Her home sleep study has demonstrated moderate obstructive sleep apnea with mild hypoxemia and an ELIZABETH of 23 events per hour.  She has been more symptomatic and is falling asleep during the day at times that are inappropriate.  She has not had any problems driving.  She also has some degree of insomnia and has been using melatonin without success.    Pulmonary function testing demonstrates an FEV1 of 1.89 L (69% predicted).  FEV1 FVC ratio was 78%.  Total lung capacity was 89% of predicted.  DLCO was 111% of predicted.    She was seen by thoracic surgery at [facility] and it was felt that repeat thoracoscopy for biopsy was not necessary at this time.  A repeat PET scan showed no uptake in her pulmonary nodules.  She had minimal uptake in her right lower lobe groundglass opacity that was felt to be most likely inflammatory.  There was felt to be no evidence of recurrent or residual lymphoma.    Past Medical History:   Diagnosis Date   • Acquired hypothyroidism 6/14/2021   • Arthritis 02/2022    ankle-right   •  Asthma 02/2022    inhaler for exercise induced -rarely uses   • Back pain    • Back pain    • Breath shortness 2019    lymphoma   • Cancer ([facility]) 2019    lymphoma-lungs-chemo immunotherapy   • Chickenpox    • COPD (chronic obstructive pulmonary disease) ([facility])    • Crohns disease    • Daytime sleepiness    • Diarrhea    • Difficulty breathing    • Essential hypertension 6/14/2021   • Fatigue    • GERD (gastroesophageal reflux disease)    • Heart burn    • Heartburn    • High cholesterol    • Hypothyroidism    • IBD (inflammatory bowel disease) 6/14/2021    Chron's   • Indigestion    • Influenza    • Insomnia    • Lung cancer ([facility])    • MALT lymphoma ([facility]) 6/14/2021   • Obesity    • Pulmonary emphysema ([facility])    • Pulmonary nodule    • Pulmonary nodules 6/14/2021   • Restless leg syndrome    • Shortness of breath    • Type 2 diabetes mellitus without complication, with long-term current use of insulin ([facility]) 6/14/2021   • Wears glasses    • Wheezing        ROS:     Constitutional: Denies fevers, chills, night sweats, fatigue or weight loss  Eyes: Denies vision loss, pain, drainage, double vision  Ears, Nose, Throat: Denies earache, tinnitus, hoarseness  Cardiovascular: Denies chest pain, tightness, palpitations  Respiratory: See HPI  Sleep: See HPI  GI: Denies abdominal pain, nausea, vomiting, diarrhea  : Denies frequent urination, hematuria, painful urination  Musculoskeletal: Denies back pain, painful joints, sore muscles  Neurological: Denies headaches, seizures  Skin: Denies rashes, color changes  Psychiatric: Denies depression or thoughts of suicide  Hematologic: Denies bleeding tendency or clotting tendency  Allergic/Immunologic: Denies rhinitis, skin sensitivity    Social History     Socioeconomic History   • Marital status: Single     Spouse name: Not on file   • Number of children: Not on file   • Years of education: Not on file   • Highest education level: Not on file   Occupational History   • Not on file    Tobacco Use   • Smoking status: Never Smoker   • Smokeless tobacco: Never Used   Vaping Use   • Vaping Use: Never used   Substance and Sexual Activity   • Alcohol use: Yes     Alcohol/week: 0.0 oz     Comment: 1 to 2 drinks/year   • Drug use: Never   • Sexual activity: Not on file   Other Topics Concern   • Not on file   Social History Narrative    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      Social Determinants of Health     Financial Resource Strain: Not on file   Food Insecurity: Not on file   Transportation Needs: Not on file   Physical Activity: Not on file   Stress: Not on file   Social Connections: Not on file   Intimate Partner Violence: Not on file   Housing Stability: Not on file     Patient has no known allergies.  Current Outpatient Medications on File Prior to Visit   Medication Sig Dispense Refill   • ibuprofen (MOTRIN) 800 MG Tab Take 1 Tablet by mouth every 8 hours as needed for Moderate Pain. 30 Tablet 0   • Lidocaine 4 % Patch Apply patch to painful area. Patch may remain in place for up to 12 hours in any 24-hour period. No more than 1 patch can be used in a 24-hour period. 15 Patch 0   • ONETOUCH VERIO strip      • methocarbamol (ROBAXIN) 750 MG Tab 1 TAB BY MOUTH EVERY 6 HOURS ONLY IF NEEDED FOR PAIN, MUSCLE SPASM, AND/OR MUSCLE TIGHTNESS. MAY CAUSE DROWSINESS. 40 Tablet 0   • therapeutic multivitamin-minerals (THERAGRAN-M) Tab Take 1 Tablet by mouth every day.     • levothyroxine (SYNTHROID) 100 MCG Tab Take 1 Tablet by mouth every morning on an empty stomach. 90 Tablet 2   • metformin (GLUCOPHAGE) 1000 MG tablet Take 1 Tablet by mouth 2 times a day with meals. 180 Tablet 2   • Insulin Lispro (HUMALOG KWIKPEN) 200 UNIT/ML Solution Pen-injector Inject 6-14 Units under the skin 4 Times a Day:  Before Meals and at Bedtime. 21 mL 1   • Blood Glucose Test Strips Use one One Touch Verio Flex strip to test blood sugar 4 times daily. 400 Strip 0   • atorvastatin (LIPITOR) 40 MG  "Tab Take 1 Tablet by mouth at bedtime. 90 Tablet 3   • ramipril (ALTACE) 2.5 MG Cap TAKE 1 CAPSULE DAILY 90 Capsule 3   • pantoprazole (PROTONIX) 20 MG tablet TAKE 1 TABLET DAILY 90 Tablet 3   • Dulaglutide (TRULICITY) 1.5 MG/0.5ML Solution Pen-injector Inject 0.5 mL under the skin every 7 days. 45 mL 1   • Insulin Degludec (TRESIBA FLEXTOUCH) 100 UNIT/ML Solution Pen-injector Inject 20 Units under the skin every morning. 1800 mL 1     No current facility-administered medications on file prior to visit.     /82 (BP Location: Right arm, Patient Position: Sitting, BP Cuff Size: Adult)   Pulse 98   Ht 1.676 m (5' 6\")   Wt 114 kg (252 lb)   SpO2 93%   Family History   Problem Relation Age of Onset   • No Known Problems Other    • Heart Disease Mother         rhuematic fever, valve replacement   • Breast Cancer Mother    • Heart Failure Father        Physical Exam:    HEENT: PERRLA, EOMI, no scleral icterus, no nasal or oral lesions  Neck: No thyromegaly, no adenopathy, no bruits  Mallampatti: Grade II  Lungs: Equal breath sounds, no wheezes or crackles  Heart: Regular rate and rhythm, no gallops or murmurs  Abdomen: Soft, benign, no organomegaly  Extremities: No clubbing, cyanosis, or edema  Neurologic: Cranial nerve, motor, and sensory exam are normal    1. DAVID (obstructive sleep apnea)    2. SOB (shortness of breath)    3. Hypoxemia    4. Multiple idiopathic pulmonary cysts    5. Endometrioid adenocarcinoma of uterus (HCC)    6. MALT lymphoma (HCC)    7. Pulmonary nodules    8. DM-2, Type 2 diabetes mellitus without complication, with long-term current use of insulin (HCC)        She is due for repeat scan and follow-up with oncology in 3 months.  We will see her back in about 4 months.  We did start her on auto CPAP at 8 to 15 cm of water pressure and have referred her to our sleep clinic for continued follow-up.  She does have a complicating issue of insomnia.        Electronically signed by Alfredo MCFADDEN" KAMLESH Lombardo  on 06/08/22      No Recipients

## 2022-06-08 NOTE — PROGRESS NOTES
Chief Complaint   Patient presents with   • Results     HST Results.       HPI:  The patient is a 56-year-old woman with a rather complex medical history.  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  During her evaluation at [facility] in [facility] she was found to have multiple pulmonary nodules.  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  At that time she was also noted to have evidence of large bilateral cysts on her chest CT.  There was no specific diagnosis of the pulmonary cystic disease.  She tells me at that time the physicians were uncertain about the etiology of her pulmonary cysts.  At that time she had a hysterectomy and NADER/BSO with lymph node dissection and partial vaginectomy.  She was treated with 6 cycles of carbo/Taxol and several cycles of rituximab for her BALT lymphoma.  Patient had been living in [place] but moved to [place] and has subsequently moved to this area.  She has been seen by oncology and GYN oncology at [facility].  She had recurrent pulmonary opacities and underwent bronchoscopy with lymph node fine-needle aspiration in September 2021 which was consistent with MALT lymphoma.  She had 4 subsequent treatments with rituximab.  Her complicated history is outlined by oncology notes and gynecologic oncology notes from [facility].  CT scan from 8/24/2021 was reviewed showing significant cystic lung disease involving primarily the right lung with an upper lobe predominance.  There are a few small cysts on the left side.  A PET scan from 2/9/2022 shows no significant FDG avidity in the lungs.  There is no change in the cystic lung disease and scattered nodules.  The patient tells me that she believes she had alpha 1 antitrypsin testing at [facility] [place].  She has never smoker with no history of significant asthma or COPD.  She does have a history of  Crohn's disease and a history of diabetes mellitus.  [place] she has experienced some shortness of breath with activity.  She is not complaining of any significant cough or sputum production.  She says she does have some occasional wheezing.  She has no history of hemoptysis.  The patient is also complaining of some poor sleep quality with some daytime hypersomnolence.  Oxygen saturation at rest on room air was 96% and fell to 91% with ambulation.      Her home sleep study has demonstrated moderate obstructive sleep apnea with mild hypoxemia and an ELIZABETH of 23 events per hour.  She has been more symptomatic and is falling asleep during the day at times that are inappropriate.  She has not had any problems driving.  She also has some degree of insomnia and has been using melatonin without success.    Pulmonary function testing demonstrates an FEV1 of 1.89 L (69% predicted).  FEV1 FVC ratio was 78%.  Total lung capacity was 89% of predicted.  DLCO was 111% of predicted.    She was seen by thoracic surgery at [facility] and it was felt that repeat thoracoscopy for biopsy was not necessary at this time.  A repeat PET scan showed no uptake in her pulmonary nodules.  She had minimal uptake in her right lower lobe groundglass opacity that was felt to be most likely inflammatory.  There was felt to be no evidence of recurrent or residual lymphoma.    Past Medical History:   Diagnosis Date   • Acquired hypothyroidism 6/14/2021   • Arthritis 02/2022    ankle-right   • Asthma 02/2022    inhaler for exercise induced -rarely uses   • Back pain    • Back pain    • Breath shortness 2019    lymphoma   • Cancer (HCC) 2019    lymphoma-lungs-chemo immunotherapy   • Chickenpox    • COPD (chronic obstructive pulmonary disease) (HCC)    • Crohns disease    • Daytime sleepiness    • Diarrhea    • Difficulty breathing    • Essential hypertension 6/14/2021   • Fatigue    • GERD (gastroesophageal reflux disease)     • Heart burn    • Heartburn    • High cholesterol    • Hypothyroidism    • IBD (inflammatory bowel disease) 6/14/2021    Chron's   • Indigestion    • Influenza    • Insomnia    • Lung cancer (HCC)    • MALT lymphoma (HCC) 6/14/2021   • Obesity    • Pulmonary emphysema (HCC)    • Pulmonary nodule    • Pulmonary nodules 6/14/2021   • Restless leg syndrome    • Shortness of breath    • Type 2 diabetes mellitus without complication, with long-term current use of insulin (HCC) 6/14/2021   • Wears glasses    • Wheezing        ROS:     Constitutional: Denies fevers, chills, night sweats, fatigue or weight loss  Eyes: Denies vision loss, pain, drainage, double vision  Ears, Nose, Throat: Denies earache, tinnitus, hoarseness  Cardiovascular: Denies chest pain, tightness, palpitations  Respiratory: See HPI  Sleep: See HPI  GI: Denies abdominal pain, nausea, vomiting, diarrhea  : Denies frequent urination, hematuria, painful urination  Musculoskeletal: Denies back pain, painful joints, sore muscles  Neurological: Denies headaches, seizures  Skin: Denies rashes, color changes  Psychiatric: Denies depression or thoughts of suicide  Hematologic: Denies bleeding tendency or clotting tendency  Allergic/Immunologic: Denies rhinitis, skin sensitivity    Social History     Socioeconomic History   • Marital status: Single     Spouse name: Not on file   • Number of children: Not on file   • Years of education: Not on file   • Highest education level: Not on file   Occupational History   • Not on file   Tobacco Use   • Smoking status: Never Smoker   • Smokeless tobacco: Never Used   Vaping Use   • Vaping Use: Never used   Substance and Sexual Activity   • Alcohol use: Yes     Alcohol/week: 0.0 oz     Comment: 1 to 2 drinks/year   • Drug use: Never   • Sexual activity: Not on file   Other Topics Concern   • Not on file   Social History Narrative    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      Social Determinants  of [facility]     Financial Resource Strain: Not on file   Food Insecurity: Not on file   Transportation Needs: Not on file   Physical Activity: Not on file   Stress: Not on file   Social Connections: Not on file   Intimate Partner Violence: Not on file   Housing Stability: Not on file     Patient has no known allergies.  Current Outpatient Medications on File Prior to Visit   Medication Sig Dispense Refill   • ibuprofen (MOTRIN) 800 MG Tab Take 1 Tablet by mouth every 8 hours as needed for Moderate Pain. 30 Tablet 0   • Lidocaine 4 % Patch Apply patch to painful area. Patch may remain in place for up to 12 hours in any 24-hour period. No more than 1 patch can be used in a 24-hour period. 15 Patch 0   • ONETOUCH VERIO strip      • methocarbamol (ROBAXIN) 750 MG Tab 1 TAB BY MOUTH EVERY 6 HOURS ONLY IF NEEDED FOR PAIN, MUSCLE SPASM, AND/OR MUSCLE TIGHTNESS. MAY CAUSE DROWSINESS. 40 Tablet 0   • therapeutic multivitamin-minerals (THERAGRAN-M) Tab Take 1 Tablet by mouth every day.     • levothyroxine (SYNTHROID) 100 MCG Tab Take 1 Tablet by mouth every morning on an empty stomach. 90 Tablet 2   • metformin (GLUCOPHAGE) 1000 MG tablet Take 1 Tablet by mouth 2 times a day with meals. 180 Tablet 2   • Insulin Lispro (HUMALOG KWIKPEN) 200 UNIT/ML Solution Pen-injector Inject 6-14 Units under the skin 4 Times a Day:  Before Meals and at Bedtime. 21 mL 1   • Blood Glucose Test Strips Use one One Touch Verio Flex strip to test blood sugar 4 times daily. 400 Strip 0   • atorvastatin (LIPITOR) 40 MG Tab Take 1 Tablet by mouth at bedtime. 90 Tablet 3   • ramipril (ALTACE) 2.5 MG Cap TAKE 1 CAPSULE DAILY 90 Capsule 3   • pantoprazole (PROTONIX) 20 MG tablet TAKE 1 TABLET DAILY 90 Tablet 3   • Dulaglutide (TRULICITY) 1.5 MG/0.5ML Solution Pen-injector Inject 0.5 mL under the skin every 7 days. 45 mL 1   • Insulin Degludec (TRESIBA FLEXTOUCH) 100 UNIT/ML Solution Pen-injector Inject 20 Units under the skin every morning. 1800 mL 1  "    No current facility-administered medications on file prior to visit.     /82 (BP Location: Right arm, Patient Position: Sitting, BP Cuff Size: Adult)   Pulse 98   Ht 1.676 m (5' 6\")   Wt 114 kg (252 lb)   SpO2 93%   Family History   Problem Relation Age of Onset   • No Known Problems Other    • Heart Disease Mother         rhuematic fever, valve replacement   • Breast Cancer Mother    • Heart Failure Father        Physical Exam:    HEENT: PERRLA, EOMI, no scleral icterus, no nasal or oral lesions  Neck: No thyromegaly, no adenopathy, no bruits  Mallampatti: Grade II  Lungs: Equal breath sounds, no wheezes or crackles  Heart: Regular rate and rhythm, no gallops or murmurs  Abdomen: Soft, benign, no organomegaly  Extremities: No clubbing, cyanosis, or edema  Neurologic: Cranial nerve, motor, and sensory exam are normal    1. DAVID (obstructive sleep apnea)    2. SOB (shortness of breath)    3. Hypoxemia    4. Multiple idiopathic pulmonary cysts    5. Endometrioid adenocarcinoma of uterus (HCC)    6. MALT lymphoma (HCC)    7. Pulmonary nodules    8. DM-2, Type 2 diabetes mellitus without complication, with long-term current use of insulin (HCC)        She is due for repeat scan and follow-up with oncology in 3 months.  We will see her back in about 4 months.  We did start her on auto CPAP at 8 to 15 cm of water pressure and have referred her to our sleep clinic for continued follow-up.  She does have a complicating issue of insomnia.  "

## 2022-07-25 DIAGNOSIS — Z79.4 TYPE 2 DIABETES MELLITUS WITHOUT COMPLICATION, WITH LONG-TERM CURRENT USE OF INSULIN (HCC): ICD-10-CM

## 2022-07-25 DIAGNOSIS — E11.9 TYPE 2 DIABETES MELLITUS WITHOUT COMPLICATION, WITH LONG-TERM CURRENT USE OF INSULIN (HCC): ICD-10-CM

## 2022-07-25 RX ORDER — INSULIN DEGLUDEC 100 U/ML
20 INJECTION, SOLUTION SUBCUTANEOUS EVERY MORNING
Qty: 1800 ML | Refills: 0 | Status: SHIPPED | OUTPATIENT
Start: 2022-07-25 | End: 2022-08-11 | Stop reason: SDUPTHER

## 2022-07-25 NOTE — TELEPHONE ENCOUNTER
Received request via: Patient    Was the patient seen in the last year in this department? Yes    Does the patient have an active prescription (recently filled or refills available) for medication(s) requested? No     Pt has a NP appointment with you on 8.11.22  However she is out of her medication.

## 2022-08-11 ENCOUNTER — OFFICE VISIT (OUTPATIENT)
Dept: MEDICAL GROUP | Facility: PHYSICIAN GROUP | Age: 57
End: 2022-08-11
Payer: COMMERCIAL

## 2022-08-11 VITALS
HEART RATE: 89 BPM | RESPIRATION RATE: 16 BRPM | HEIGHT: 65 IN | TEMPERATURE: 97.2 F | WEIGHT: 250 LBS | SYSTOLIC BLOOD PRESSURE: 116 MMHG | BODY MASS INDEX: 41.65 KG/M2 | OXYGEN SATURATION: 95 % | DIASTOLIC BLOOD PRESSURE: 68 MMHG

## 2022-08-11 DIAGNOSIS — R74.01 ELEVATED ALT MEASUREMENT: ICD-10-CM

## 2022-08-11 DIAGNOSIS — E03.9 ACQUIRED HYPOTHYROIDISM: ICD-10-CM

## 2022-08-11 DIAGNOSIS — C55 ENDOMETRIOID ADENOCARCINOMA OF UTERUS (HCC): ICD-10-CM

## 2022-08-11 DIAGNOSIS — K21.9 GASTROESOPHAGEAL REFLUX DISEASE WITHOUT ESOPHAGITIS: ICD-10-CM

## 2022-08-11 DIAGNOSIS — Z79.4 TYPE 2 DIABETES MELLITUS WITHOUT COMPLICATION, WITH LONG-TERM CURRENT USE OF INSULIN (HCC): ICD-10-CM

## 2022-08-11 DIAGNOSIS — E11.9 TYPE 2 DIABETES MELLITUS WITHOUT COMPLICATION, WITH LONG-TERM CURRENT USE OF INSULIN (HCC): ICD-10-CM

## 2022-08-11 DIAGNOSIS — Z76.89 ENCOUNTER TO ESTABLISH CARE: ICD-10-CM

## 2022-08-11 DIAGNOSIS — E78.5 DYSLIPIDEMIA: ICD-10-CM

## 2022-08-11 DIAGNOSIS — Z12.11 COLON CANCER SCREENING: ICD-10-CM

## 2022-08-11 DIAGNOSIS — Z12.31 BREAST CANCER SCREENING BY MAMMOGRAM: ICD-10-CM

## 2022-08-11 DIAGNOSIS — I10 ESSENTIAL HYPERTENSION: ICD-10-CM

## 2022-08-11 LAB
HBA1C MFR BLD: 10.8 % (ref 0–5.6)
INT CON NEG: ABNORMAL
INT CON POS: ABNORMAL

## 2022-08-11 PROCEDURE — 92250 FUNDUS PHOTOGRAPHY W/I&R: CPT | Mod: 26 | Performed by: NURSE PRACTITIONER

## 2022-08-11 PROCEDURE — 83036 HEMOGLOBIN GLYCOSYLATED A1C: CPT | Performed by: NURSE PRACTITIONER

## 2022-08-11 PROCEDURE — 99214 OFFICE O/P EST MOD 30 MIN: CPT | Performed by: NURSE PRACTITIONER

## 2022-08-11 RX ORDER — PANTOPRAZOLE SODIUM 20 MG/1
20 TABLET, DELAYED RELEASE ORAL DAILY
Qty: 90 TABLET | Refills: 3 | Status: SHIPPED | OUTPATIENT
Start: 2022-08-11 | End: 2023-05-23

## 2022-08-11 RX ORDER — ATORVASTATIN CALCIUM 40 MG/1
40 TABLET, FILM COATED ORAL
Qty: 90 TABLET | Refills: 3 | Status: SHIPPED | OUTPATIENT
Start: 2022-08-11

## 2022-08-11 RX ORDER — LEVOTHYROXINE SODIUM 0.1 MG/1
100 TABLET ORAL
Qty: 90 TABLET | Refills: 3 | Status: SHIPPED | OUTPATIENT
Start: 2022-08-11

## 2022-08-11 RX ORDER — RAMIPRIL 2.5 MG/1
2.5 CAPSULE ORAL DAILY
Qty: 90 CAPSULE | Refills: 3 | Status: SHIPPED | OUTPATIENT
Start: 2022-08-11

## 2022-08-11 RX ORDER — INSULIN LISPRO 200 [IU]/ML
INJECTION, SOLUTION SUBCUTANEOUS
Qty: 21 ML | Refills: 1 | Status: SHIPPED | OUTPATIENT
Start: 2022-08-11 | End: 2023-04-10

## 2022-08-11 RX ORDER — INSULIN DEGLUDEC 100 U/ML
30 INJECTION, SOLUTION SUBCUTANEOUS EVERY MORNING
Qty: 1800 ML | Refills: 3 | Status: SHIPPED | OUTPATIENT
Start: 2022-08-11 | End: 2023-04-10

## 2022-08-11 RX ORDER — DULAGLUTIDE 1.5 MG/.5ML
0.5 INJECTION, SOLUTION SUBCUTANEOUS
Qty: 45 ML | Refills: 1 | Status: SHIPPED | OUTPATIENT
Start: 2022-08-11 | End: 2022-11-13

## 2022-08-11 ASSESSMENT — FIBROSIS 4 INDEX: FIB4 SCORE: 1.82

## 2022-08-11 NOTE — PROGRESS NOTES
Subjective:     CC:    Chief Complaint   Patient presents with    Establish Care     New patient     Medication Refill     Pt needs her Tresiba, Humalog, Metformin, Theragram and lidocaine patch refilled.        HISTORY OF THE PRESENT ILLNESS: Patient is a 57 y.o. female, here today to establish care. Prior PCP was Dr Dominguez. Relocated from [place], [place] 6/2021. She has a history of DM2, HTN, hypothyroid, crohns, lymphoma, endometrial cancer; followed by ONC/Dr Elliott & Dr García/GYN ONC at [facility].The below problems were discussed/reviewed at this visit:    Problem   Cancer with pulmonary metastases (HCC) (possible)     Hx MALT lymphoma, endometrial cancer; recent CT noted pulmonary nodules, enlarged lymph nodes concerning for lung metastasis. Experiencing dyspnea since relocating to [place] 2021.  She was referred & met with Dr Lombardo/pulmonolgy on 6/7/2022; had sleep study, PFTs & started on auto CPAP at 8-15cm water pressure; next appt in 3 months 9/2022       Ibd (Inflammatory Bowel Disease)    Chronic [id number]; subsequently led to findings of endometrial cancer & lymphoma.   She is followed by ONC/Dr Maeys & GYN ONC/Dr García at [facility]. Not on active chemo/radiation currently. She is on daily PPI/protonix for GERD  - referral placed today for surveillance colonoscopy with GI     Malt Lymphoma (Hcc)    Hx of endometrial adenocarcinoma and incidentally found MALT lymphoma on staging imaging 2019; was under care of Dr Phil Lackey/[facility]; currently managed by Dr Elliott/ONC [facility];    - s/p total abdominal hysterectomy, partial vaginectomy with para-aortic/pelvio lymph node biopsy and salpingoophercetomy  9/28/2019  - s/p 6 cycles of carbo/taxol with the addition of rituximab for the MALT, completed 03/27/20.   - Repeat CT Chest following completion of therapy in 04/2020 showed significant improvement in lungs. She has been under surveillance for both diagnoses since then.   - most  recently s/p 4 weekly doses of rituximab with clinical improvement in dyspnea, persistent lung lesions on PET in February 2022, although they are not FDG avid. Repeat PET 4/29/22 without any avid lesions again  - had visit with onc on 8/3/2022; repeat PET, PFTs, labs ordered/pendng  - also established with Dr García/GYN ONC on 11/2021; Q6 months surveillance visits till 3/2025; seen by DORIS Akins/BRUNO Mccabe on 7/8/2022 was told pelvic exam okay; next visit in 1/2023     Endometrioid Adenocarcinoma of Uterus (Hcc)    Hx of Stage IB(i+) grade 2 endometrioid endometrial adenocarcinoma with incidental finding of MALT lymphoma on staging imaging 2019.  - s/p NADER, partial vaginectomy with PPLND, BSO and sentinel node injection procedure on 9/28/2019.   - S/p Carbo/Taxol for endometrial adenocarcinoma, with the addition of rituximab for MALT lymphoma. Completed 6 cycles from 12/11/19 to 3/27/20 and radiation completed on 12/2019.    Relocated from [place], [place] in 6/2021; established care with Dr García/ONC GYN at [facility] 11/2021; Q6 months surveillance visits with pelvic/rectovaginal exams  - last seen on 7/8/2022 by DORIS Akins/BRUNO Mccabe was told pelvic exam okay; next visit in 1/2023  - patient requesting recheck on ; was 17.7 in 6/2021     DM-2, Type 2 diabetes mellitus without complication, with long-term current use of insulin ([facility])    Diagnosed DM2 in her mid 40s; was under endocrinology management in [place]; currently on  Metformin 1000mg BID  Trulicuty 1.5mg/0.5ml Q7 days  Tresiba 30units QHS  Humolog 15units with meals & HS    GFR >60; Microalbum creat ratio 9  BP managed on Ramipril 2.5mg QD  Dyslipidemia managed on Atorvastatin 40mg QHS  Normal monofilament 6/2021  Retinal screen done today     Acquired Hypothyroidism    Diagnosed low thyroid around her mid 20s; currently on levothyroxine 100mcg QD     Gastroesophageal Reflux Disease Without Esophagitis    Taking Pantoprazole 20mg QD  Hx crohns, lymphoma, endometrial  Ca     Essential Hypertension    Taking Ramipril 2.5mg QD  Hx Dm2, dyslipidemia     Dyslipidemia    Taking Atorvastatin 40mg QHS  HX Dm2, HTN     Elevated Alt Measurement (Resolved)    RESOLVED     Latest Reference Range & Units 6/18/21 12:01 10/12/21 09:46 11/5/21 12:22   AST(SGOT) 12 - 45 U/L 41 29 39   ALT(SGPT) 2 - 50 U/L 54 (H) 34 40   Alkaline Phosphatase 30 - 99 U/L 99 88 96   Total Bilirubin 0.1 - 1.5 mg/dL 0.4 0.4 0.3              Patient Active Problem List   Diagnosis    IBD (inflammatory bowel disease)    MALT lymphoma (HCC)    Endometrioid adenocarcinoma of uterus (HCC)    DM-2, Type 2 diabetes mellitus without complication, with long-term current use of insulin (HCC)    Acquired hypothyroidism    Gastroesophageal reflux disease without esophagitis    Essential hypertension    Dyslipidemia    Pulmonary nodules    Chronic bilateral low back pain without sciatica    Mediastinal lymphadenopathy    Hilar lymphadenopathy    Cancer with pulmonary metastases (HCC) (possible)     Palpitations    Other fatigue    Orthopnea    Dyspnea on exertion    Encounter for completion of form with patient    Abnormal laboratory test    Ganglion cyst of finger of left hand    Dupuytren's contracture of left hand       Past Medical History:   Diagnosis Date    Acquired hypothyroidism 6/14/2021    Arthritis 02/2022    ankle-right    Asthma 02/2022    inhaler for exercise induced -rarely uses    Back pain     Back pain     Breath shortness 2019    lymphoma    Cancer (HCC) 2019    lymphoma-lungs-chemo immunotherapy    Chickenpox     COPD (chronic obstructive pulmonary disease) (HCC)     Crohns disease     Daytime sleepiness     Diarrhea     Difficulty breathing     Essential hypertension 6/14/2021    Fatigue     GERD (gastroesophageal reflux disease)     Heart burn     Heartburn     High cholesterol     Hypothyroidism     IBD (inflammatory bowel disease) 6/14/2021    Chron's    Indigestion     Influenza     Insomnia     Lung  cancer (HCC)     MALT lymphoma (HCC) 6/14/2021    Obesity     Pulmonary emphysema (HCC)     Pulmonary nodule     Pulmonary nodules 6/14/2021    Restless leg syndrome     Shortness of breath     Type 2 diabetes mellitus without complication, with long-term current use of insulin (HCC) 6/14/2021    Wears glasses     Wheezing         Current Outpatient Medications Ordered in Epic   Medication Sig Dispense Refill    ramipril (ALTACE) 2.5 MG Cap Take 1 Capsule by mouth every day. 90 Capsule 3    pantoprazole (PROTONIX) 20 MG tablet Take 1 Tablet by mouth every day. 90 Tablet 3    metformin (GLUCOPHAGE) 1000 MG tablet Take 1 Tablet by mouth 2 times a day with meals. 180 Tablet 3    levothyroxine (SYNTHROID) 100 MCG Tab Take 1 Tablet by mouth every morning on an empty stomach. 90 Tablet 3    Insulin Degludec (TRESIBA FLEXTOUCH) 100 UNIT/ML Solution Pen-injector Inject 30 Units under the skin every morning. 1800 mL 3    Dulaglutide (TRULICITY) 1.5 MG/0.5ML Solution Pen-injector Inject 0.5 mL under the skin every 7 days. 45 mL 1    Insulin Lispro (HUMALOG KWIKPEN) 200 UNIT/ML Solution Pen-injector Inject 15 Units under the skin 4 Times a Day:  Before Meals and at Bedtime. 21 mL 1    atorvastatin (LIPITOR) 40 MG Tab Take 1 Tablet by mouth at bedtime. 90 Tablet 3    Lidocaine 4 % Patch Apply patch to painful area. Patch may remain in place for up to 12 hours in any 24-hour period. No more than 1 patch can be used in a 24-hour period. 15 Patch 0    ONETOUCH VERIO strip       therapeutic multivitamin-minerals (THERAGRAN-M) Tab Take 1 Tablet by mouth every day.      Blood Glucose Test Strips Use one One Touch Verio Flex strip to test blood sugar 4 times daily. 400 Strip 0     No current Epic-ordered facility-administered medications on file.        Past Surgical History:   Procedure Laterality Date    PB PALMAR FASCIECTOMY Left 3/3/2022    Procedure: LEFT INDEX FINGER FASCIECTOMY;  Surgeon: Brien Villagomez M.D.;  Location:  "SURGERY SAME DAY [facility] [place] [facility];  Service: Orthopedics    GASTRIC BYPASS LAPAROSCOPIC  2016    sleeve    ABDOMINAL HYSTERECTOMY TOTAL      NADER + SBO    BRONCHOSCOPY      CARPAL TUNNEL RELEASE      CHOLECYSTECTOMY      LUNG BIOPSY OPEN      SLEEVE,JESSICA VASO THIGH          Allergies:  Patient has no known allergies.    Health Maintenance: Completed    ROS:   Review of Systems   Constitutional: Negative.  Negative for fever and malaise/fatigue.   HENT: Negative.     Eyes: Negative.    Respiratory:  Negative for cough, sputum production and shortness of breath.    Cardiovascular:  Negative for chest pain, palpitations and leg swelling.   Gastrointestinal: Negative.    Genitourinary: Negative.    Musculoskeletal: Negative.    Neurological: Negative.    Endo/Heme/Allergies: Negative.    Psychiatric/Behavioral: Negative.         Objective:     Exam: /68 (BP Location: Left arm, Patient Position: Sitting, BP Cuff Size: Adult)   Pulse 89   Temp 36.2 °C (97.2 °F) (Temporal)   Resp 16   Ht 1.65 m (5' 4.96\")   Wt 113 kg (250 lb)   SpO2 95%  Body mass index is 41.65 kg/m².    Physical Exam  Constitutional:       Appearance: Normal appearance.   Cardiovascular:      Rate and Rhythm: Normal rate and regular rhythm.      Pulses: Normal pulses.      Heart sounds: Normal heart sounds.   Pulmonary:      Effort: Pulmonary effort is normal.      Breath sounds: Normal breath sounds.   Musculoskeletal:         General: Normal range of motion.      Cervical back: Normal range of motion and neck supple.   Skin:     General: Skin is warm and dry.   Neurological:      General: No focal deficit present.      Mental Status: She is alert and oriented to person, place, and time.   Psychiatric:         Mood and Affect: Mood normal.         Behavior: Behavior normal.         Thought Content: Thought content normal.         Judgment: Judgment normal.     Labs: reviewed    Assessment & Plan:   57 y.o. female with the following -    Problem List " Items Addressed This Visit       Endometrioid adenocarcinoma of uterus (HCC)    Relevant Orders    CANCER ANTIGEN 125    DM-2, Type 2 diabetes mellitus without complication, with long-term current use of insulin (HCC)     A1C today 10.8 (up from 9.8); home fasting glucose 220s, bedtime glucose 160s; she has been out of tresiba for the past 3 weeks  - continue all current medications; refilled today  Metformin 1000mg BID  Trulicuty 1.5mg/0.5ml Q7 days  Tresiba 30units QHS  Humolog 15units with meals & HS    - also with hx hypothyroid on levothyroxine; referral to establish with endocrinology submitted today         Relevant Medications    metformin (GLUCOPHAGE) 1000 MG tablet    Insulin Degludec (TRESIBA FLEXTOUCH) 100 UNIT/ML Solution Pen-injector    Dulaglutide (TRULICITY) 1.5 MG/0.5ML Solution Pen-injector    Insulin Lispro (HUMALOG KWIKPEN) 200 UNIT/ML Solution Pen-injector    Other Relevant Orders    POCT  A1C (Completed)    Referral to Endocrinology    POCT Retinal Eye Exam    Microalbumin Creat Ratio Urine (Lab Collect)    Acquired hypothyroidism     TSH 3.250 in 10/2021; no excessive weight loss/gain, heat/cold intolerance; had some fatigue, dyspnea since relocating to [place], hx lymphoma/endometrial CA; some improvement since completing rituximab  - continue Levothyroxine 100mcg QD  - monitor TSH levels annually  - getting referral to establish with endocrinology for DM2 & hypothyroid management           Relevant Medications    levothyroxine (SYNTHROID) 100 MCG Tab    Other Relevant Orders    TSH WITH REFLEX TO FT4    FREE THYROXINE    T3 FREE    Referral to Endocrinology    Gastroesophageal reflux disease without esophagitis     Symptoms of heart burn currently managed on current dosing  - continue Pantoprazole 20mg QD         Relevant Medications    pantoprazole (PROTONIX) 20 MG tablet    Essential hypertension     BP in clinic today 116/68; denies CP, palpitations, dizziness  - continue Ramipril 2.5mg  QD  - monitor annual CMP/fasting lipid         Relevant Medications    ramipril (ALTACE) 2.5 MG Cap    atorvastatin (LIPITOR) 40 MG Tab    Dyslipidemia      Latest Reference Range & Units 6/18/21 12:01   Cholesterol,Tot 100 - 199 mg/dL 187   Triglycerides 0 - 149 mg/dL 181 (H)   HDL >=40 mg/dL 49   LDL <100 mg/dL 102 (H)   - was switched from simvastatin to atorvastatin 2021 with above lab result  The 10-year ASCVD risk score (Sarita DÍAZ Jr., et al., 2013) is: 5.8%  - no myalgia  - continue Atorvastatin 40mg QHS  - monitor annual fasting lipid         Relevant Medications    atorvastatin (LIPITOR) 40 MG Tab    Other Relevant Orders    Lipid Profile    RESOLVED: Elevated ALT measurement     Other Visit Diagnoses       Colon cancer screening        Relevant Orders    Referral to GI for Colonoscopy    Breast cancer screening by mammogram        Relevant Orders    MA-SCREENING MAMMO BILAT W/TOMOSYNTHESIS W/CAD    Encounter to establish care              Medications Prescribed Today:  2. DM-2, Type 2 diabetes mellitus without complication, with long-term current use of insulin (HCC)  - metformin (GLUCOPHAGE) 1000 MG tablet; Take 1 Tablet by mouth 2 times a day with meals.  Dispense: 180 Tablet; Refill: 3  - Insulin Degludec (TRESIBA FLEXTOUCH) 100 UNIT/ML Solution Pen-injector; Inject 30 Units under the skin every morning.  Dispense: 1800 mL; Refill: 3  - Dulaglutide (TRULICITY) 1.5 MG/0.5ML Solution Pen-injector; Inject 0.5 mL under the skin every 7 days.  Dispense: 45 mL; Refill: 1  - Insulin Lispro (HUMALOG KWIKPEN) 200 UNIT/ML Solution Pen-injector; Inject 15 Units under the skin 4 Times a Day:  Before Meals and at Bedtime.  Dispense: 21 mL; Refill: 1    3. Acquired hypothyroidism  - levothyroxine (SYNTHROID) 100 MCG Tab; Take 1 Tablet by mouth every morning on an empty stomach.  Dispense: 90 Tablet; Refill: 3    4. Essential hypertension  - ramipril (ALTACE) 2.5 MG Cap; Take 1 Capsule by mouth every day.  Dispense: 90  Capsule; Refill: 3    5. Dyslipidemia  - atorvastatin (LIPITOR) 40 MG Tab; Take 1 Tablet by mouth at bedtime.  Dispense: 90 Tablet; Refill: 3    8. Gastroesophageal reflux disease without esophagitis  - pantoprazole (PROTONIX) 20 MG tablet; Take 1 Tablet by mouth every day.  Dispense: 90 Tablet; Refill: 3    Educated in proper administration of medication(s) ordered today including safety, possible SE, risks, benefits, rationale and alternatives to therapy.     Return in about 3 months (around 11/11/2022) for DM2/HTN/hypothyroid/referral.    Please note that this dictation was created using voice recognition software. I have made every reasonable attempt to correct obvious errors, but I expect that there are errors of grammar and possibly content that I did not discover before finalizing the note.

## 2022-08-13 PROBLEM — R74.01 ELEVATED ALT MEASUREMENT: Status: RESOLVED | Noted: 2021-06-24 | Resolved: 2022-08-13

## 2022-08-13 ASSESSMENT — ENCOUNTER SYMPTOMS
PALPITATIONS: 0
PSYCHIATRIC NEGATIVE: 1
GASTROINTESTINAL NEGATIVE: 1
SPUTUM PRODUCTION: 0
CONSTITUTIONAL NEGATIVE: 1
EYES NEGATIVE: 1
COUGH: 0
FEVER: 0
NEUROLOGICAL NEGATIVE: 1
MUSCULOSKELETAL NEGATIVE: 1
SHORTNESS OF BREATH: 0

## 2022-08-13 NOTE — ASSESSMENT & PLAN NOTE
A1C today 10.8 (up from 9.8); home fasting glucose 220s, bedtime glucose 160s; she has been out of tresiba for the past 3 weeks  - continue all current medications; refilled today  Metformin 1000mg BID  Trulicuty 1.5mg/0.5ml Q7 days  Tresiba 30units QHS  Humolog 15units with meals & HS    - also with hx hypothyroid on levothyroxine; referral to establish with endocrinology submitted today

## 2022-08-13 NOTE — ASSESSMENT & PLAN NOTE
BP in clinic today 116/68; denies CP, palpitations, dizziness  - continue Ramipril 2.5mg QD  - monitor annual CMP/fasting lipid

## 2022-08-13 NOTE — ASSESSMENT & PLAN NOTE
Latest Reference Range & Units 6/18/21 12:01   Cholesterol,Tot 100 - 199 mg/dL 187   Triglycerides 0 - 149 mg/dL 181 (H)   HDL >=40 mg/dL 49   LDL <100 mg/dL 102 (H)     - was switched from simvastatin to atorvastatin 2021 with above lab result  The 10-year ASCVD risk score (Sarita DÍAZ Jr., et al., 2013) is: 5.8%  - no myalgia  - continue Atorvastatin 40mg QHS  - monitor annual fasting lipid

## 2022-08-13 NOTE — ASSESSMENT & PLAN NOTE
TSH 3.250 in 10/2021; no excessive weight loss/gain, heat/cold intolerance; had some fatigue, dyspnea since relocating to [place], hx lymphoma/endometrial CA; some improvement since completing rituximab  - continue Levothyroxine 100mcg QD  - monitor TSH levels annually  - getting referral to establish with endocrinology for DM2 & hypothyroid management

## 2022-08-18 ENCOUNTER — HOSPITAL ENCOUNTER (OUTPATIENT)
Dept: RADIOLOGY | Facility: MEDICAL CENTER | Age: 57
End: 2022-08-18
Attending: INTERNAL MEDICINE
Payer: COMMERCIAL

## 2022-08-18 DIAGNOSIS — C88.4 MUCOSA ASSOCIATED LYMPHOID TISSUE (MALT) (HCC): ICD-10-CM

## 2022-08-18 PROCEDURE — A9552 F18 FDG: HCPCS

## 2022-08-31 DIAGNOSIS — H53.31 ABNORMAL RETINAL CORRESPONDENCE: ICD-10-CM

## 2022-08-31 LAB — RETINAL SCREEN: NEGATIVE

## 2022-09-01 NOTE — PROGRESS NOTES
1. 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

## 2022-09-09 ENCOUNTER — HOSPITAL ENCOUNTER (OUTPATIENT)
Dept: RADIOLOGY | Facility: MEDICAL CENTER | Age: 57
End: 2022-09-09
Attending: NURSE PRACTITIONER
Payer: COMMERCIAL

## 2022-09-09 DIAGNOSIS — Z12.31 BREAST CANCER SCREENING BY MAMMOGRAM: ICD-10-CM

## 2022-09-09 PROCEDURE — 77063 BREAST TOMOSYNTHESIS BI: CPT

## 2022-09-24 ENCOUNTER — HOSPITAL ENCOUNTER (OUTPATIENT)
Dept: PULMONOLOGY | Facility: MEDICAL CENTER | Age: 57
End: 2022-09-24
Attending: INTERNAL MEDICINE
Payer: COMMERCIAL

## 2022-09-24 PROCEDURE — 94726 PLETHYSMOGRAPHY LUNG VOLUMES: CPT | Mod: 26 | Performed by: INTERNAL MEDICINE

## 2022-09-24 PROCEDURE — 94726 PLETHYSMOGRAPHY LUNG VOLUMES: CPT

## 2022-09-24 PROCEDURE — 94729 DIFFUSING CAPACITY: CPT

## 2022-09-24 PROCEDURE — 94729 DIFFUSING CAPACITY: CPT | Mod: 26 | Performed by: INTERNAL MEDICINE

## 2022-09-24 PROCEDURE — 94060 EVALUATION OF WHEEZING: CPT | Mod: 26 | Performed by: INTERNAL MEDICINE

## 2022-09-24 PROCEDURE — 94060 EVALUATION OF WHEEZING: CPT

## 2022-09-24 RX ORDER — ALBUTEROL SULFATE 2.5 MG/3ML
2.5 SOLUTION RESPIRATORY (INHALATION)
Status: DISCONTINUED | OUTPATIENT
Start: 2022-09-24 | End: 2022-09-25 | Stop reason: HOSPADM

## 2022-09-24 RX ADMIN — ALBUTEROL SULFATE 2.5 MG: 2.5 SOLUTION RESPIRATORY (INHALATION) at 09:00

## 2022-09-24 ASSESSMENT — PULMONARY FUNCTION TESTS
FEV1/FVC_PREDICTED: 79
FVC_PERCENT_PREDICTED: 76
FEV1/FVC_PERCENT_CHANGE: 2
FEV1/FVC_PERCENT_CHANGE: 200
FEV1_LLN: 2.2
FEV1_PERCENT_CHANGE: 3
FEV1/FVC_PERCENT_PREDICTED: 96
FEV1/FVC: 79
FVC_LLN: 2.79
FEV1_PERCENT_PREDICTED: 76
FEV1/FVC: 77
FEV1/FVC_PERCENT_LLN: 66
FEV1/FVC_PERCENT_PREDICTED: 99
FVC_LLN: 2.79
FEV1/FVC: 77
FVC: 2.55
FEV1/FVC_PERCENT_LLN: 66
FVC: 2.47
FEV1_PERCENT_PREDICTED: 71
FEV1/FVC_PERCENT_PREDICTED: 79
FEV1: 2.02
FVC_PREDICTED: 3.34
FEV1_PERCENT_CHANGE: 6
FEV1/FVC: 79.22
FEV1/FVC_PERCENT_PREDICTED: 100
FEV1_LLN: 2.2
FVC_PERCENT_PREDICTED: 73
FEV1: 1.89
FEV1/FVC_PERCENT_PREDICTED: 97
FEV1_PREDICTED: 2.64

## 2022-10-13 ENCOUNTER — PATIENT MESSAGE (OUTPATIENT)
Dept: SLEEP MEDICINE | Facility: MEDICAL CENTER | Age: 57
End: 2022-10-13
Payer: COMMERCIAL

## 2022-10-25 ENCOUNTER — PATIENT MESSAGE (OUTPATIENT)
Dept: SLEEP MEDICINE | Facility: MEDICAL CENTER | Age: 57
End: 2022-10-25
Payer: COMMERCIAL

## 2022-10-31 ENCOUNTER — HOSPITAL ENCOUNTER (OUTPATIENT)
Dept: LAB | Facility: MEDICAL CENTER | Age: 57
End: 2022-10-31
Attending: NURSE PRACTITIONER
Payer: COMMERCIAL

## 2022-10-31 DIAGNOSIS — E03.9 ACQUIRED HYPOTHYROIDISM: ICD-10-CM

## 2022-10-31 DIAGNOSIS — C55 ENDOMETRIOID ADENOCARCINOMA OF UTERUS (HCC): ICD-10-CM

## 2022-10-31 DIAGNOSIS — Z79.4 TYPE 2 DIABETES MELLITUS WITHOUT COMPLICATION, WITH LONG-TERM CURRENT USE OF INSULIN (HCC): ICD-10-CM

## 2022-10-31 DIAGNOSIS — E11.9 TYPE 2 DIABETES MELLITUS WITHOUT COMPLICATION, WITH LONG-TERM CURRENT USE OF INSULIN (HCC): ICD-10-CM

## 2022-10-31 DIAGNOSIS — E78.5 DYSLIPIDEMIA: ICD-10-CM

## 2022-10-31 LAB
CANCER AG125 SERPL-ACNC: 13.1 U/ML (ref 0–35)
CHOLEST SERPL-MCNC: 168 MG/DL (ref 100–199)
FASTING STATUS PATIENT QL REPORTED: NORMAL
HDLC SERPL-MCNC: 58 MG/DL
LDLC SERPL CALC-MCNC: 79 MG/DL
T3FREE SERPL-MCNC: 2.71 PG/ML (ref 2–4.4)
T4 FREE SERPL-MCNC: 1.41 NG/DL (ref 0.93–1.7)
TRIGL SERPL-MCNC: 155 MG/DL (ref 0–149)
TSH SERPL DL<=0.005 MIU/L-ACNC: 3.39 UIU/ML (ref 0.38–5.33)

## 2022-10-31 PROCEDURE — 84481 FREE ASSAY (FT-3): CPT

## 2022-10-31 PROCEDURE — 80061 LIPID PANEL: CPT

## 2022-10-31 PROCEDURE — 84439 ASSAY OF FREE THYROXINE: CPT

## 2022-10-31 PROCEDURE — 86304 IMMUNOASSAY TUMOR CA 125: CPT

## 2022-10-31 PROCEDURE — 84443 ASSAY THYROID STIM HORMONE: CPT

## 2022-10-31 PROCEDURE — 36415 COLL VENOUS BLD VENIPUNCTURE: CPT

## 2022-11-01 ENCOUNTER — NON-PROVIDER VISIT (OUTPATIENT)
Dept: MEDICAL GROUP | Facility: PHYSICIAN GROUP | Age: 57
End: 2022-11-01
Payer: COMMERCIAL

## 2022-11-01 DIAGNOSIS — E11.9 TYPE 2 DIABETES MELLITUS WITHOUT COMPLICATION, WITH LONG-TERM CURRENT USE OF INSULIN (HCC): ICD-10-CM

## 2022-11-01 DIAGNOSIS — Z23 NEED FOR VACCINATION: ICD-10-CM

## 2022-11-01 DIAGNOSIS — Z79.4 TYPE 2 DIABETES MELLITUS WITHOUT COMPLICATION, WITH LONG-TERM CURRENT USE OF INSULIN (HCC): ICD-10-CM

## 2022-11-01 PROCEDURE — 90686 IIV4 VACC NO PRSV 0.5 ML IM: CPT | Performed by: FAMILY MEDICINE

## 2022-11-01 PROCEDURE — 90677 PCV20 VACCINE IM: CPT | Performed by: FAMILY MEDICINE

## 2022-11-01 PROCEDURE — 90471 IMMUNIZATION ADMIN: CPT | Performed by: FAMILY MEDICINE

## 2022-11-01 PROCEDURE — 90472 IMMUNIZATION ADMIN EACH ADD: CPT | Performed by: FAMILY MEDICINE

## 2022-11-01 NOTE — PROGRESS NOTES
"Stefania Serrano is a 57 y.o. female here for a non-provider visit for:   FLU      Reason for immunization: Annual Flu Vaccine  Immunization records indicate need for vaccine: Yes, confirmed with Epic  Minimum interval has been met for this vaccine: Yes  ABN completed: Not Indicated    VIS Dated  [id number]     was given to patient: No  All IAC Questionnaire questions were answered \"No.\"    Patient tolerated injection and no adverse effects were observed or reported: Yes    Pt scheduled for next dose in series: Not Indicated      Stefania Serrano is a 57 y.o. female here for a non-provider visit for:   PREVNAR 20 (PCV20)    Reason for immunization: continue or complete series started at the office  Immunization records indicate need for vaccine: Yes, confirmed with Epic  Minimum interval has been met for this vaccine: Yes  ABN completed: Yes    VIS Dated   was given to patient:   All IAC Questionnaire questions were answered \"No.\"    Patient tolerated injection and no adverse effects were observed or reported: Yes    Pt scheduled for next dose in series: Not Indicated    "

## 2022-11-10 ENCOUNTER — OFFICE VISIT (OUTPATIENT)
Dept: SLEEP MEDICINE | Facility: MEDICAL CENTER | Age: 57
End: 2022-11-10
Payer: COMMERCIAL

## 2022-11-10 VITALS
BODY MASS INDEX: 38.25 KG/M2 | OXYGEN SATURATION: 96 % | SYSTOLIC BLOOD PRESSURE: 110 MMHG | HEIGHT: 66 IN | HEART RATE: 83 BPM | DIASTOLIC BLOOD PRESSURE: 70 MMHG | WEIGHT: 238 LBS

## 2022-11-10 DIAGNOSIS — Z79.4 TYPE 2 DIABETES MELLITUS WITHOUT COMPLICATION, WITH LONG-TERM CURRENT USE OF INSULIN (HCC): ICD-10-CM

## 2022-11-10 DIAGNOSIS — R91.8 PULMONARY NODULES: ICD-10-CM

## 2022-11-10 DIAGNOSIS — E11.9 TYPE 2 DIABETES MELLITUS WITHOUT COMPLICATION, WITH LONG-TERM CURRENT USE OF INSULIN (HCC): ICD-10-CM

## 2022-11-10 DIAGNOSIS — J98.4 MULTIPLE IDIOPATHIC PULMONARY CYSTS: ICD-10-CM

## 2022-11-10 DIAGNOSIS — G47.33 OSA (OBSTRUCTIVE SLEEP APNEA): ICD-10-CM

## 2022-11-10 DIAGNOSIS — C88.4 MALT LYMPHOMA (HCC): ICD-10-CM

## 2022-11-10 DIAGNOSIS — C55 ENDOMETRIOID ADENOCARCINOMA OF UTERUS (HCC): ICD-10-CM

## 2022-11-10 DIAGNOSIS — R06.02 SOB (SHORTNESS OF BREATH): ICD-10-CM

## 2022-11-10 PROCEDURE — 99214 OFFICE O/P EST MOD 30 MIN: CPT | Performed by: INTERNAL MEDICINE

## 2022-11-10 ASSESSMENT — FIBROSIS 4 INDEX: FIB4 SCORE: 1.82

## 2022-11-11 ENCOUNTER — OFFICE VISIT (OUTPATIENT)
Dept: MEDICAL GROUP | Facility: PHYSICIAN GROUP | Age: 57
End: 2022-11-11
Payer: COMMERCIAL

## 2022-11-11 VITALS
SYSTOLIC BLOOD PRESSURE: 132 MMHG | BODY MASS INDEX: 38.89 KG/M2 | OXYGEN SATURATION: 94 % | HEIGHT: 66 IN | DIASTOLIC BLOOD PRESSURE: 88 MMHG | TEMPERATURE: 97.5 F | HEART RATE: 80 BPM | RESPIRATION RATE: 16 BRPM | WEIGHT: 242 LBS

## 2022-11-11 DIAGNOSIS — E03.9 ACQUIRED HYPOTHYROIDISM: ICD-10-CM

## 2022-11-11 DIAGNOSIS — Z79.4 TYPE 2 DIABETES MELLITUS WITHOUT COMPLICATION, WITH LONG-TERM CURRENT USE OF INSULIN (HCC): ICD-10-CM

## 2022-11-11 DIAGNOSIS — E11.9 TYPE 2 DIABETES MELLITUS WITHOUT COMPLICATION, WITH LONG-TERM CURRENT USE OF INSULIN (HCC): ICD-10-CM

## 2022-11-11 DIAGNOSIS — E78.5 DYSLIPIDEMIA: ICD-10-CM

## 2022-11-11 PROCEDURE — 99214 OFFICE O/P EST MOD 30 MIN: CPT | Performed by: NURSE PRACTITIONER

## 2022-11-11 RX ORDER — TIRZEPATIDE 7.5 MG/.5ML
7.5 INJECTION, SOLUTION SUBCUTANEOUS
COMMUNITY
Start: 2022-10-24

## 2022-11-11 ASSESSMENT — FIBROSIS 4 INDEX: FIB4 SCORE: 1.82

## 2022-11-11 NOTE — PROGRESS NOTES
Subjective:     CC:   Chief Complaint   Patient presents with    Follow-Up     Labwork and mammo    Knee Injury     [id number] ago. cortisone shot.         HPI:   Patient is a 57 y.o. established female patient with medical history listed below here today to review lab/mammogram results. Active concern today is pain in her left knee the past month after she hit it, She is inquiring about cortisone IJ. She is established with [facility] & she will either make appointment or utilize their express clinic.      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 She saw [facility] on 10/4/2022; follow up in 12 months     Acquired Hypothyroidism    Diagnosed low thyroid around her mid 20s; currently on levothyroxine 100mcg QD  Managed at [facility]      Dyslipidemia    Taking Atorvastatin 40mg QHS  HX Dm2, HTN         Patient Active Problem List   Diagnosis    IBD (inflammatory bowel disease)    MALT lymphoma (HCC)    Endometrioid adenocarcinoma of uterus (HCC)    DM-2, Type 2 diabetes mellitus without complication, with long-term current use of insulin (HCC)    Acquired hypothyroidism    Gastroesophageal reflux disease without esophagitis    Essential hypertension    Dyslipidemia    Pulmonary nodules    Chronic bilateral low back pain without sciatica    Mediastinal lymphadenopathy    Hilar lymphadenopathy    Cancer with pulmonary metastases (HCC)  (possible)     Palpitations    Other fatigue    Orthopnea    Dyspnea on exertion    Encounter for completion of form with patient    Abnormal laboratory test    Ganglion cyst of finger of left hand    Dupuytren's contracture of left hand       Past Medical History:   Diagnosis Date    Acquired hypothyroidism 6/14/2021    Arthritis 02/2022    ankle-right    Asthma 02/2022    inhaler for exercise induced -rarely uses    Back pain     Back pain     Breath shortness 2019    lymphoma    Cancer (HCC) 2019    lymphoma-lungs-chemo immunotherapy    Chickenpox     COPD (chronic obstructive pulmonary disease) (HCC)     Crohns disease     Daytime sleepiness     Diarrhea     Difficulty breathing     Essential hypertension 6/14/2021    Fatigue     GERD (gastroesophageal reflux disease)     Heart burn     Heartburn     High cholesterol     Hypothyroidism     IBD (inflammatory bowel disease) 6/14/2021    Chron's    Indigestion     Influenza     Insomnia     Lung cancer (HCC)     MALT lymphoma (HCC) 6/14/2021    Obesity     Pulmonary emphysema (HCC)     Pulmonary nodule     Pulmonary nodules 6/14/2021    Restless leg syndrome     Shortness of breath     Type 2 diabetes mellitus without complication, with long-term current use of insulin (HCC) 6/14/2021    Wears glasses     Wheezing         Past Surgical History:   Procedure Laterality Date    PB PALMAR FASCIECTOMY Left 3/3/2022    Procedure: LEFT INDEX FINGER FASCIECTOMY;  Surgeon: Brien Villagomez M.D.;  Location: SURGERY SAME DAY [facility];  Service: Orthopedics    GASTRIC BYPASS LAPAROSCOPIC  2016    sleeve    ABDOMINAL HYSTERECTOMY TOTAL      NADER + SBO    BRONCHOSCOPY      CARPAL TUNNEL RELEASE      CHOLECYSTECTOMY      LUNG BIOPSY OPEN      SLEEVE,JESSICA VASO THIGH          Current Outpatient Medications on File Prior to Visit   Medication Sig Dispense Refill    MOUNJARO 7.5 MG/0.5ML Solution Pen-injector Inject 7.5 mg as directed.      ramipril (ALTACE) 2.5 MG Cap Take 1 Capsule  by mouth every day. 90 Capsule 3    pantoprazole (PROTONIX) 20 MG tablet Take 1 Tablet by mouth every day. 90 Tablet 3    levothyroxine (SYNTHROID) 100 MCG Tab Take 1 Tablet by mouth every morning on an empty stomach. 90 Tablet 3    Insulin Degludec (TRESIBA FLEXTOUCH) 100 UNIT/ML Solution Pen-injector Inject 30 Units under the skin every morning. 1800 mL 3    Insulin Lispro (HUMALOG KWIKPEN) 200 UNIT/ML Solution Pen-injector Inject 15 Units under the skin 4 Times a Day:  Before Meals and at Bedtime. 21 mL 1    atorvastatin (LIPITOR) 40 MG Tab Take 1 Tablet by mouth at bedtime. 90 Tablet 3    Lidocaine 4 % Patch Apply patch to painful area. Patch may remain in place for up to 12 hours in any 24-hour period. No more than 1 patch can be used in a 24-hour period. 15 Patch 0    ONETOUCH VERIO strip       therapeutic multivitamin-minerals (THERAGRAN-M) Tab Take 1 Tablet by mouth every day.      Blood Glucose Test Strips Use one One Touch Verio Flex strip to test blood sugar 4 times daily. 400 Strip 0    Continuous Blood Gluc Sensor (FREESTYLE SAADIA 2 SENSOR) Misc APPLY 1 SENSOR TOPICALLY EVERY 14 DAYS AS DIRECTED      XIGDUO XR 5-1000 MG TABLET SR 24 HR Take 1 Tablet by mouth 2 times a day.      pioglitazone (ACTOS) 30 MG Tab Take 1 Tablet by mouth every day.       No current facility-administered medications on file prior to visit.        Health Maintenance: Completed  Mammogram 9/2022- Breasts are almost entirely fatty, with no radiographic evidence of malignancy; recall in 1 year    ROS:  Review of Systems   Constitutional:  Positive for malaise/fatigue and weight loss. Negative for fever.   Respiratory:  Negative for cough, sputum production and shortness of breath.    Cardiovascular:  Negative for chest pain, palpitations and leg swelling.   Gastrointestinal: Negative.    Genitourinary: Negative.    Musculoskeletal:  Positive for joint pain.   Neurological: Negative.    Endo/Heme/Allergies: Negative.   "  Psychiatric/Behavioral: Negative.       Objective:     Exam:  /88   Pulse 80   Temp 36.4 °C (97.5 °F) (Tympanic)   Resp 16   Ht 1.676 m (5' 6\")   Wt 110 kg (242 lb)   SpO2 94%   BMI 39.06 kg/m²  Body mass index is 39.06 kg/m².    Physical Exam  Constitutional:       Appearance: Normal appearance.   Cardiovascular:      Rate and Rhythm: Normal rate and regular rhythm.      Pulses: Normal pulses.      Heart sounds: Normal heart sounds.   Pulmonary:      Effort: Pulmonary effort is normal.      Breath sounds: Normal breath sounds.   Musculoskeletal:         General: Tenderness present. No swelling. Normal range of motion.      Cervical back: Normal range of motion and neck supple.      Right lower leg: No edema.      Left lower leg: No edema.      Comments: Left knee   Skin:     General: Skin is warm and dry.   Neurological:      General: No focal deficit present.      Mental Status: She is alert and oriented to person, place, and time.   Psychiatric:         Mood and Affect: Mood normal.         Behavior: Behavior normal.         Thought Content: Thought content normal.         Judgment: Judgment normal.       Labs: reviewed with patient    Assessment & Plan:     57 y.o. female with the following -     Problem List Items Addressed This Visit       DM-2, Type 2 diabetes mellitus without complication, with long-term current use of insulin (HCC)     Treatment plan per [facility]; will request records; per pt A1C has reduced to 6's  XigDuo XR 5-1000mg BID  Mounjaro 7.5mg recently added (Trulicuty & humolog d/taty)  Tresiba 30units QHS         Relevant Medications    MOUNJARO 7.5 MG/0.5ML Solution Pen-injector    XIGDUO XR 5-1000 MG TABLET SR 24 HR    pioglitazone (ACTOS) 30 MG Tab    Acquired hypothyroidism      Latest Reference Range & Units 10/31/22 08:19   TSH 0.380 - 5.330 uIU/mL 3.390   Free T-4 0.93 - 1.70 ng/dL 1.41   T3,Free 2.00 - 4.40 pg/mL 2.71   Management per [facility]; no excessive weight loss/gain, " heat/cold intolerance; having some fatigue, dyspnea, night sweats since relocating to [place], hx lymphoma/endometrial CA; had temporary improvement in symptoms after completing rituximab. She recently discussed these symptoms with [facility] & states there is plan to start another round of treatment.   - continue Levothyroxine 100mcg QD per [facility]  - get records from endocrinology             Dyslipidemia      Latest Reference Range & Units 06/18/21 12:01 10/31/22 08:19   Cholesterol,Tot 100 - 199 mg/dL 187 168   Triglycerides 0 - 149 mg/dL 181 (H) 155 (H)   HDL >=40 mg/dL 49 58   LDL <100 mg/dL 102 (H) 79   - was switched from simvastatin to atorvastatin in 2021; improvement since then  The 10-year ASCVD risk score (Speedy DK, et al., 2019) is: 6%  - no myalgia  - continue Atorvastatin 40mg QHS  - monitor annual fasting lipid          Return in about 6 months (around 5/11/2023) for Annual Visit.    Please note that this dictation was created using voice recognition software. I have made every reasonable attempt to correct obvious errors, but I expect that there are errors of grammar and possibly content that I did not discover before finalizing the note.

## 2022-11-11 NOTE — PROGRESS NOTES
Chief Complaint   Patient presents with    Follow-Up     Last seen 06/07/22    Results     PFT 09/24/22       HPI:  The patient is a 57-year-old woman with a rather complex medical history.  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  During her evaluation at [facility] in [facility] she was found to have multiple pulmonary nodules.  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  At that time she was also noted to have evidence of large bilateral cysts on her chest CT.  There was no specific diagnosis of the pulmonary cystic disease.  She tells me at that time the physicians were uncertain about the etiology of her pulmonary cysts.  At that time she had a hysterectomy and NADER/BSO with lymph node dissection and partial vaginectomy.  She was treated with 6 cycles of carbo/Taxol and several cycles of rituximab for her BALT lymphoma.  Patient had been living in [place] but moved to [place] and has subsequently moved to this area.  She has been seen by oncology and GYN oncology at [facility].  She had recurrent pulmonary opacities and underwent bronchoscopy with lymph node fine-needle aspiration in September 2021 which was consistent with MALT lymphoma.  She had 4 subsequent treatments with rituximab.  Her complicated history is outlined by oncology notes and gynecologic oncology notes from [facility].  CT scan from 8/24/2021 was reviewed showing significant cystic lung disease involving primarily the right lung with an upper lobe predominance.  There are a few small cysts on the left side.  A PET scan from 2/9/2022 shows no significant FDG avidity in the lungs.  There is no change in the cystic lung disease and scattered nodules.  The patient tells me that she believes she had alpha 1 antitrypsin testing at [facility] that showed no evidence of that disease.  She has not had testing  for BARROS.  The pathology reports that I have reviewed our FNA aspirates and flow cytometry with no description of the histology from her VATS biopsy.  She is a never smoker with no history of significant asthma or COPD.  She does have a history of Crohn's disease and a history of diabetes mellitus.  [place] has experienced some shortness of breath with activity.  She is not complaining of any significant cough or sputum production.  She says she does have some occasional wheezing.  She has no history of hemoptysis.  The patient is also complaining of some poor sleep quality with some daytime hypersomnolence.  Oxygen saturation at rest on room air was 96% and fell to 91% with ambulation.        Her home sleep study has demonstrated moderate obstructive sleep apnea with mild hypoxemia and an ELIZABETH of 23 events per hour.  She has been more symptomatic and is falling asleep during the day at times that are inappropriate.  She has not had any problems driving.  She also has some degree of insomnia and has been using melatonin without success.  We started her on auto CPAP but she was unable to tolerate it.     Pulmonary function testing demonstrates an FEV1 of 1.89 L (69% predicted).  FEV1 FVC ratio was 78%.  Total lung capacity was 89% of predicted.  DLCO was 111% of predicted.  Repeat testing was in the same range with an FEV1 of 1.89 L (71% predicted).  FEV1 FVC ratio was 77% with a total lung capacity of 105% of predicted and a DLCO of 103% of predicted.  Residual volume was increased on the second study so that component of air trapping was commented upon.  However this may have been due to less effort in the expiratory maneuver.     She was seen by thoracic surgery at [facility] and it was felt that repeat thoracoscopy for biopsy was not necessary at this time.  A repeat PET scan showed no uptake in her pulmonary nodules.  She had minimal uptake in her right lower lobe groundglass  opacity that was felt to be most likely inflammatory.  There was felt to be no evidence of recurrent or residual lymphoma.   She had a repeat PET scan on 8/18/2022.  This time there was no change in her nodules or cysts.  However, there was slight uptake in the right midlung linear opacity consistent with possible recurrence of her MALT lymphoma.  She is being scheduled for a repeat course of chemotherapy for her lymphoma.  The finding of the increased residual volume and some air trapping should not interfere with her treatment.       Past Medical History:   Diagnosis Date    Acquired hypothyroidism 6/14/2021    Arthritis 02/2022    ankle-right    Asthma 02/2022    inhaler for exercise induced -rarely uses    Back pain     Back pain     Breath shortness 2019    lymphoma    Cancer ([facility]) 2019    lymphoma-lungs-chemo immunotherapy    Chickenpox     COPD (chronic obstructive pulmonary disease) ([facility])     Crohns disease     Daytime sleepiness     Diarrhea     Difficulty breathing     Essential hypertension 6/14/2021    Fatigue     GERD (gastroesophageal reflux disease)     Heart burn     Heartburn     High cholesterol     Hypothyroidism     IBD (inflammatory bowel disease) 6/14/2021    Chron's    Indigestion     Influenza     Insomnia     Lung cancer ([facility])     MALT lymphoma ([facility]) 6/14/2021    Obesity     Pulmonary emphysema ([facility])     Pulmonary nodule     Pulmonary nodules 6/14/2021    Restless leg syndrome     Shortness of breath     Type 2 diabetes mellitus without complication, with long-term current use of insulin ([facility]) 6/14/2021    Wears glasses     Wheezing        ROS:   Constitutional: Denies fevers, chills, night sweats, fatigue or weight loss  Eyes: Denies vision loss, pain, drainage, double vision  Ears, Nose, Throat: Denies earache, tinnitus, hoarseness  Cardiovascular: Denies chest pain, tightness, palpitations  Respiratory: See HPI  Sleep: See HPI  GI: Denies abdominal pain, nausea, vomiting, diarrhea  :  Denies frequent urination, hematuria, painful urination  Musculoskeletal: Denies back pain, painful joints, sore muscles  Neurological: Denies headaches, seizures  Skin: Denies rashes, color changes  Psychiatric: Denies depression or thoughts of suicide  Hematologic: Denies bleeding tendency or clotting tendency  Allergic/Immunologic: Denies rhinitis, skin sensitivity    Social History     Socioeconomic History    Marital status: Single     Spouse name: Not on file    Number of children: Not on file    Years of education: Not on file    Highest education level: Not on file   Occupational History    Not on file   Tobacco Use    Smoking status: Never    Smokeless tobacco: Never   Vaping Use    Vaping Use: Never used   Substance and Sexual Activity    Alcohol use: Yes     Alcohol/week: 0.0 oz     Comment: 1 to 2 drinks/year    Drug use: Never    Sexual activity: Not on file   Other Topics Concern    Not on file   Social History Narrative    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      Social Determinants of Health     Financial Resource Strain: Not on file   Food Insecurity: Not on file   Transportation Needs: Not on file   Physical Activity: Not on file   Stress: Not on file   Social Connections: Not on file   Intimate Partner Violence: Not on file   Housing Stability: Not on file     Patient has no known allergies.  Current Outpatient Medications on File Prior to Visit   Medication Sig Dispense Refill    ramipril (ALTACE) 2.5 MG Cap Take 1 Capsule by mouth every day. 90 Capsule 3    pantoprazole (PROTONIX) 20 MG tablet Take 1 Tablet by mouth every day. 90 Tablet 3    metformin (GLUCOPHAGE) 1000 MG tablet Take 1 Tablet by mouth 2 times a day with meals. 180 Tablet 3    levothyroxine (SYNTHROID) 100 MCG Tab Take 1 Tablet by mouth every morning on an empty stomach. 90 Tablet 3    Insulin Degludec (TRESIBA FLEXTOUCH) 100 UNIT/ML Solution Pen-injector Inject 30 Units under the skin every morning.  "1800 mL 3    atorvastatin (LIPITOR) 40 MG Tab Take 1 Tablet by mouth at bedtime. 90 Tablet 3    ONETOUCH VERIO strip       Blood Glucose Test Strips Use one One Touch Verio Flex strip to test blood sugar 4 times daily. 400 Strip 0    Dulaglutide (TRULICITY) 1.5 MG/0.5ML Solution Pen-injector Inject 0.5 mL under the skin every 7 days. (Patient not taking: Reported on 11/10/2022) 45 mL 1    Insulin Lispro (HUMALOG KWIKPEN) 200 UNIT/ML Solution Pen-injector Inject 15 Units under the skin 4 Times a Day:  Before Meals and at Bedtime. (Patient not taking: Reported on 11/10/2022) 21 mL 1    Lidocaine 4 % Patch Apply patch to painful area. Patch may remain in place for up to 12 hours in any 24-hour period. No more than 1 patch can be used in a 24-hour period. (Patient not taking: Reported on 11/10/2022) 15 Patch 0    therapeutic multivitamin-minerals (THERAGRAN-M) Tab Take 1 Tablet by mouth every day. (Patient not taking: Reported on 11/10/2022)       No current facility-administered medications on file prior to visit.     /70 (BP Location: Right arm, Patient Position: Sitting, BP Cuff Size: Adult)   Pulse 83   Ht 1.676 m (5' 6\")   Wt 108 kg (238 lb)   SpO2 96%   Family History   Problem Relation Age of Onset    No Known Problems Other     Heart Disease Mother         rhuematic fever, valve replacement    Breast Cancer Mother     Heart Failure Father        Physical Exam:  BMI 38.41.  No distress at rest  HEENT: PERRLA, EOMI, no scleral icterus, no nasal or oral lesions  Neck: No thyromegaly, no adenopathy, no bruits  Mallampatti: Grade III  Lungs: Equal breath sounds, no wheezes or crackles  Heart: Regular rate and rhythm, no gallops or murmurs  Abdomen: Soft, benign, no organomegaly  Extremities: No clubbing, cyanosis, or edema  Neurologic: Cranial nerve, motor, and sensory exam are normal    1. MALT lymphoma (HCC)    2. Multiple idiopathic pulmonary cysts    3. Endometrioid adenocarcinoma of uterus (HCC)    4. " DAVID (obstructive sleep apnea)    5. SOB (shortness of breath)    6. Pulmonary nodules    7. DM-2, Type 2 diabetes mellitus without complication, with long-term current use of insulin (HCC)        This woman has stable pulmonary function and there is no pulmonary contraindication to planned chemotherapy for her MALT lymphoma.  She could not tolerate CPAP.  The etiology of her cystic disease remains obscure.  By history she has no evidence of alpha 1 antitrypsin deficiency.  We have not tested her for lymphangioleiomyomatosis.  This would be unlikely with her normal DLCO.  She may have  LIP associated with her underlying lymphoma.  There has been no change over the past year and her cystic disease.  We will see her back in 4 months for repeat evaluation at that time.

## 2022-11-13 RX ORDER — PIOGLITAZONEHYDROCHLORIDE 30 MG/1
30 TABLET ORAL
COMMUNITY
Start: 2022-09-20

## 2022-11-13 RX ORDER — DAPAGLIFLOZIN AND METFORMIN HYDROCHLORIDE 5; 1000 MG/1; MG/1
1 TABLET, FILM COATED, EXTENDED RELEASE ORAL 2 TIMES DAILY
COMMUNITY
Start: 2022-09-20

## 2022-11-13 ASSESSMENT — ENCOUNTER SYMPTOMS
GASTROINTESTINAL NEGATIVE: 1
PSYCHIATRIC NEGATIVE: 1
PALPITATIONS: 0
FEVER: 0
WEIGHT LOSS: 1
SPUTUM PRODUCTION: 0
SHORTNESS OF BREATH: 0
COUGH: 0
NEUROLOGICAL NEGATIVE: 1

## 2022-11-14 NOTE — ASSESSMENT & PLAN NOTE
Latest Reference Range & Units 10/31/22 08:19   TSH 0.380 - 5.330 uIU/mL 3.390   Free T-4 0.93 - 1.70 ng/dL 1.41   T3,Free 2.00 - 4.40 pg/mL 2.71     Management per [facility]; no excessive weight loss/gain, heat/cold intolerance; having some fatigue, dyspnea, night sweats since relocating to [place], hx lymphoma/endometrial CA; had temporary improvement in symptoms after completing rituximab. She recently discussed these symptoms with [facility] & states there is plan to start another round of treatment.   - continue Levothyroxine 100mcg QD per [facility]  - get records from endocrinology

## 2022-11-14 NOTE — ASSESSMENT & PLAN NOTE
Latest Reference Range & Units 06/18/21 12:01 10/31/22 08:19   Cholesterol,Tot 100 - 199 mg/dL 187 168   Triglycerides 0 - 149 mg/dL 181 (H) 155 (H)   HDL >=40 mg/dL 49 58   LDL <100 mg/dL 102 (H) 79     - was switched from simvastatin to atorvastatin in 2021; improvement since then  The 10-year ASCVD risk score (Speedy WALTERS, et al., 2019) is: 6%  - no myalgia  - continue Atorvastatin 40mg QHS  - monitor annual fasting lipid

## 2022-11-14 NOTE — ASSESSMENT & PLAN NOTE
Treatment plan per [facility]; will request records; per pt A1C has reduced to 6's  XigDuo XR 5-1000mg BID  Mounjaro 7.5mg recently added (Trulicuty & humolog d/taty)  Tresiba 30units QHS

## 2022-12-30 ENCOUNTER — HOSPITAL ENCOUNTER (OUTPATIENT)
Dept: LAB | Facility: MEDICAL CENTER | Age: 57
End: 2022-12-30
Attending: INTERNAL MEDICINE
Payer: COMMERCIAL

## 2022-12-30 LAB
ALBUMIN SERPL BCP-MCNC: 3.8 G/DL (ref 3.2–4.9)
ALBUMIN/GLOB SERPL: 0.9 G/DL
ALP SERPL-CCNC: 74 U/L (ref 30–99)
ALT SERPL-CCNC: 24 U/L (ref 2–50)
ANION GAP SERPL CALC-SCNC: 8 MMOL/L (ref 7–16)
AST SERPL-CCNC: 25 U/L (ref 12–45)
BILIRUB SERPL-MCNC: 0.7 MG/DL (ref 0.1–1.5)
BUN SERPL-MCNC: 26 MG/DL (ref 8–22)
CALCIUM ALBUM COR SERPL-MCNC: 9.1 MG/DL (ref 8.5–10.5)
CALCIUM SERPL-MCNC: 8.9 MG/DL (ref 8.5–10.5)
CHLORIDE SERPL-SCNC: 101 MMOL/L (ref 96–112)
CO2 SERPL-SCNC: 28 MMOL/L (ref 20–33)
CREAT SERPL-MCNC: 0.73 MG/DL (ref 0.5–1.4)
GFR SERPLBLD CREATININE-BSD FMLA CKD-EPI: 95 ML/MIN/1.73 M 2
GLOBULIN SER CALC-MCNC: 4.4 G/DL (ref 1.9–3.5)
GLUCOSE SERPL-MCNC: 126 MG/DL (ref 65–99)
POTASSIUM SERPL-SCNC: 4.4 MMOL/L (ref 3.6–5.5)
PROT SERPL-MCNC: 8.2 G/DL (ref 6–8.2)
SODIUM SERPL-SCNC: 137 MMOL/L (ref 135–145)
URATE SERPL-MCNC: 2.9 MG/DL (ref 1.9–8.2)

## 2022-12-30 PROCEDURE — 84550 ASSAY OF BLOOD/URIC ACID: CPT

## 2022-12-30 PROCEDURE — 80053 COMPREHEN METABOLIC PANEL: CPT

## 2022-12-30 PROCEDURE — 36415 COLL VENOUS BLD VENIPUNCTURE: CPT

## 2023-02-01 ENCOUNTER — HOSPITAL ENCOUNTER (OUTPATIENT)
Dept: LAB | Facility: MEDICAL CENTER | Age: 58
End: 2023-02-01
Attending: INTERNAL MEDICINE
Payer: COMMERCIAL

## 2023-02-01 LAB
ALBUMIN SERPL BCP-MCNC: 3.9 G/DL (ref 3.2–4.9)
ALBUMIN/GLOB SERPL: 1 G/DL
ALP SERPL-CCNC: 71 U/L (ref 30–99)
ALT SERPL-CCNC: 25 U/L (ref 2–50)
ANION GAP SERPL CALC-SCNC: 8 MMOL/L (ref 7–16)
ANISOCYTOSIS BLD QL SMEAR: ABNORMAL
AST SERPL-CCNC: 19 U/L (ref 12–45)
BASOPHILS # BLD AUTO: 3.6 % (ref 0–1.8)
BASOPHILS # BLD: 0.05 K/UL (ref 0–0.12)
BILIRUB SERPL-MCNC: 0.7 MG/DL (ref 0.1–1.5)
BUN SERPL-MCNC: 21 MG/DL (ref 8–22)
CALCIUM ALBUM COR SERPL-MCNC: 9.4 MG/DL (ref 8.5–10.5)
CALCIUM SERPL-MCNC: 9.3 MG/DL (ref 8.5–10.5)
CHLORIDE SERPL-SCNC: 107 MMOL/L (ref 96–112)
CO2 SERPL-SCNC: 26 MMOL/L (ref 20–33)
CREAT SERPL-MCNC: 0.65 MG/DL (ref 0.5–1.4)
EOSINOPHIL # BLD AUTO: 0.14 K/UL (ref 0–0.51)
EOSINOPHIL NFR BLD: 10.7 % (ref 0–6.9)
ERYTHROCYTE [DISTWIDTH] IN BLOOD BY AUTOMATED COUNT: 58 FL (ref 35.9–50)
GFR SERPLBLD CREATININE-BSD FMLA CKD-EPI: 101 ML/MIN/1.73 M 2
GLOBULIN SER CALC-MCNC: 3.9 G/DL (ref 1.9–3.5)
GLUCOSE SERPL-MCNC: 162 MG/DL (ref 65–99)
HCT VFR BLD AUTO: 47.5 % (ref 37–47)
HGB BLD-MCNC: 15.3 G/DL (ref 12–16)
LDH SERPL L TO P-CCNC: 129 U/L (ref 107–266)
LYMPHOCYTES # BLD AUTO: 0.99 K/UL (ref 1–4.8)
LYMPHOCYTES NFR BLD: 75.9 % (ref 22–41)
MACROCYTES BLD QL SMEAR: ABNORMAL
MANUAL DIFF BLD: NORMAL
MCH RBC QN AUTO: 28.5 PG (ref 27–33)
MCHC RBC AUTO-ENTMCNC: 32.2 G/DL (ref 33.6–35)
MCV RBC AUTO: 88.5 FL (ref 81.4–97.8)
MONOCYTES # BLD AUTO: 0.01 K/UL (ref 0–0.85)
MONOCYTES NFR BLD AUTO: 0.9 % (ref 0–13.4)
MORPHOLOGY BLD-IMP: NORMAL
NEUTROPHILS # BLD AUTO: 0.12 K/UL (ref 2–7.15)
NEUTROPHILS NFR BLD: 8.9 % (ref 44–72)
NRBC # BLD AUTO: 0 K/UL
NRBC BLD-RTO: 0 /100 WBC
PLATELET # BLD AUTO: 209 K/UL (ref 164–446)
PLATELET BLD QL SMEAR: NORMAL
PMV BLD AUTO: 11 FL (ref 9–12.9)
POTASSIUM SERPL-SCNC: 4.6 MMOL/L (ref 3.6–5.5)
PROT SERPL-MCNC: 7.8 G/DL (ref 6–8.2)
RBC # BLD AUTO: 5.37 M/UL (ref 4.2–5.4)
RBC BLD AUTO: PRESENT
SODIUM SERPL-SCNC: 141 MMOL/L (ref 135–145)
WBC # BLD AUTO: 1.3 K/UL (ref 4.8–10.8)

## 2023-02-01 PROCEDURE — 80053 COMPREHEN METABOLIC PANEL: CPT

## 2023-02-01 PROCEDURE — 85007 BL SMEAR W/DIFF WBC COUNT: CPT

## 2023-02-01 PROCEDURE — 83615 LACTATE (LD) (LDH) ENZYME: CPT

## 2023-02-01 PROCEDURE — 36415 COLL VENOUS BLD VENIPUNCTURE: CPT

## 2023-02-01 PROCEDURE — 85025 COMPLETE CBC W/AUTO DIFF WBC: CPT

## 2023-02-08 ENCOUNTER — DOCUMENTATION (OUTPATIENT)
Dept: MEDICAL GROUP | Facility: MEDICAL CENTER | Age: 58
End: 2023-02-08
Payer: COMMERCIAL

## 2023-02-08 ENCOUNTER — HOSPITAL ENCOUNTER (OUTPATIENT)
Dept: LAB | Facility: MEDICAL CENTER | Age: 58
End: 2023-02-08
Attending: INTERNAL MEDICINE
Payer: COMMERCIAL

## 2023-02-08 LAB
ALBUMIN SERPL BCP-MCNC: 4 G/DL (ref 3.2–4.9)
ALBUMIN/GLOB SERPL: 1.1 G/DL
ALP SERPL-CCNC: 76 U/L (ref 30–99)
ALT SERPL-CCNC: 26 U/L (ref 2–50)
ANION GAP SERPL CALC-SCNC: 9 MMOL/L (ref 7–16)
AST SERPL-CCNC: 22 U/L (ref 12–45)
BASOPHILS # BLD AUTO: 3 % (ref 0–1.8)
BASOPHILS # BLD: 0.06 K/UL (ref 0–0.12)
BILIRUB SERPL-MCNC: 0.7 MG/DL (ref 0.1–1.5)
BUN SERPL-MCNC: 21 MG/DL (ref 8–22)
CALCIUM ALBUM COR SERPL-MCNC: 9.3 MG/DL (ref 8.5–10.5)
CALCIUM SERPL-MCNC: 9.3 MG/DL (ref 8.5–10.5)
CHLORIDE SERPL-SCNC: 105 MMOL/L (ref 96–112)
CO2 SERPL-SCNC: 25 MMOL/L (ref 20–33)
CREAT SERPL-MCNC: 0.72 MG/DL (ref 0.5–1.4)
EOSINOPHIL # BLD AUTO: 0.16 K/UL (ref 0–0.51)
EOSINOPHIL NFR BLD: 8.1 % (ref 0–6.9)
ERYTHROCYTE [DISTWIDTH] IN BLOOD BY AUTOMATED COUNT: 56.1 FL (ref 35.9–50)
GFR SERPLBLD CREATININE-BSD FMLA CKD-EPI: 96 ML/MIN/1.73 M 2
GLOBULIN SER CALC-MCNC: 3.7 G/DL (ref 1.9–3.5)
GLUCOSE SERPL-MCNC: 150 MG/DL (ref 65–99)
HCT VFR BLD AUTO: 46.6 % (ref 37–47)
HGB BLD-MCNC: 15.3 G/DL (ref 12–16)
IMM GRANULOCYTES # BLD AUTO: 0.03 K/UL (ref 0–0.11)
IMM GRANULOCYTES NFR BLD AUTO: 1.5 % (ref 0–0.9)
LDH SERPL L TO P-CCNC: 139 U/L (ref 107–266)
LYMPHOCYTES # BLD AUTO: 1.08 K/UL (ref 1–4.8)
LYMPHOCYTES NFR BLD: 54.5 % (ref 22–41)
MCH RBC QN AUTO: 28.8 PG (ref 27–33)
MCHC RBC AUTO-ENTMCNC: 32.8 G/DL (ref 33.6–35)
MCV RBC AUTO: 87.6 FL (ref 81.4–97.8)
MONOCYTES # BLD AUTO: 0.3 K/UL (ref 0–0.85)
MONOCYTES NFR BLD AUTO: 15.2 % (ref 0–13.4)
NEUTROPHILS # BLD AUTO: 0.35 K/UL (ref 2–7.15)
NEUTROPHILS NFR BLD: 17.7 % (ref 44–72)
NRBC # BLD AUTO: 0 K/UL
NRBC BLD-RTO: 0 /100 WBC
PLATELET # BLD AUTO: 245 K/UL (ref 164–446)
PMV BLD AUTO: 10.8 FL (ref 9–12.9)
POTASSIUM SERPL-SCNC: 4.3 MMOL/L (ref 3.6–5.5)
PROT SERPL-MCNC: 7.7 G/DL (ref 6–8.2)
RBC # BLD AUTO: 5.32 M/UL (ref 4.2–5.4)
SODIUM SERPL-SCNC: 139 MMOL/L (ref 135–145)
URATE SERPL-MCNC: 2.9 MG/DL (ref 1.9–8.2)
WBC # BLD AUTO: 2 K/UL (ref 4.8–10.8)

## 2023-02-08 PROCEDURE — 85025 COMPLETE CBC W/AUTO DIFF WBC: CPT

## 2023-02-08 PROCEDURE — 80053 COMPREHEN METABOLIC PANEL: CPT

## 2023-02-08 PROCEDURE — 84550 ASSAY OF BLOOD/URIC ACID: CPT

## 2023-02-08 PROCEDURE — 36415 COLL VENOUS BLD VENIPUNCTURE: CPT

## 2023-02-08 PROCEDURE — 83615 LACTATE (LD) (LDH) ENZYME: CPT

## 2023-02-08 NOTE — Clinical Note
Paged for absolute neutropenia but this is improved from previous. She is undergoing treatment at [facility] currently. Please see call note.

## 2023-02-09 NOTE — PROGRESS NOTES
After hours phone call  Call on 2/8/2023 at 8:31 PM from Jaimee Serrano [phone] (home)  who reports PCP: Makenna Keys D.N.P..  Pt reports: paged by lab for critical lab value. ANC count at 350, up from 120 1 week ago. Per chart review she is in a clinical trial at [facility], treating investigator/ordering physician was Dr. Carlos Alberto Elliott. They are aware of her neutropenia, 2/2 chemotherapy treatment and this draw was to f/u on ANC count. She was previously given neutropenia precautions.     In care everywhere notes from 2/3/23:  After review of pre C2 labwork completed on 2/1/23, patient does not meet treatment parameters.   Patient labwork revealed her ANC to be 120. Parameters require patient's ANC to be greater than or equal to 1000.     CRC called patient to notify her of the results and that we would have to cancel her scheduled C2 visit on 2/3.   Patient was appreciative of the call as she lives in [place].   CRC also explain proper precautions to be used while she is neutropenic, such as avoiding large crowds, wearing a mask while out in public, and to be make us aware of any fevers she might be having while neutropenic.     Per protocol, we redraw CBC in a week. If ANC has recovered, we can proceed with C2 on 2/10/23.   CBC to be redrawn at [facility] on 2/8/23.    If ANC does not recover by day 10 of the scheduled cycle date, Azacitadine will have to be reduced at Cycle 2.  There are no dose reductions allowed for Lenalidomide in the current protocol.    Treating investigator, Dr. Elliott was notified and was in agreement with the plan.       Plan:   Will forward lab results to PCP.   Neutropenia 2/2 chemotherapy treatment  Patient was not called      Lydia Mijares M.D.

## 2023-02-11 ENCOUNTER — HOSPITAL ENCOUNTER (OUTPATIENT)
Dept: LAB | Facility: MEDICAL CENTER | Age: 58
End: 2023-02-11
Attending: INTERNAL MEDICINE
Payer: COMMERCIAL

## 2023-02-11 LAB
ALBUMIN SERPL BCP-MCNC: 4 G/DL (ref 3.2–4.9)
ALBUMIN/GLOB SERPL: 1.1 G/DL
ALP SERPL-CCNC: 80 U/L (ref 30–99)
ALT SERPL-CCNC: 26 U/L (ref 2–50)
ANION GAP SERPL CALC-SCNC: 8 MMOL/L (ref 7–16)
AST SERPL-CCNC: 24 U/L (ref 12–45)
BASOPHILS # BLD AUTO: 2.4 % (ref 0–1.8)
BASOPHILS # BLD: 0.07 K/UL (ref 0–0.12)
BILIRUB SERPL-MCNC: 0.6 MG/DL (ref 0.1–1.5)
BUN SERPL-MCNC: 20 MG/DL (ref 8–22)
CALCIUM ALBUM COR SERPL-MCNC: 9.8 MG/DL (ref 8.5–10.5)
CALCIUM SERPL-MCNC: 9.8 MG/DL (ref 8.5–10.5)
CHLORIDE SERPL-SCNC: 102 MMOL/L (ref 96–112)
CO2 SERPL-SCNC: 26 MMOL/L (ref 20–33)
CREAT SERPL-MCNC: 0.66 MG/DL (ref 0.5–1.4)
EOSINOPHIL # BLD AUTO: 0.09 K/UL (ref 0–0.51)
EOSINOPHIL NFR BLD: 3.1 % (ref 0–6.9)
ERYTHROCYTE [DISTWIDTH] IN BLOOD BY AUTOMATED COUNT: 54.3 FL (ref 35.9–50)
GFR SERPLBLD CREATININE-BSD FMLA CKD-EPI: 101 ML/MIN/1.73 M 2
GLOBULIN SER CALC-MCNC: 3.8 G/DL (ref 1.9–3.5)
GLUCOSE SERPL-MCNC: 118 MG/DL (ref 65–99)
HCT VFR BLD AUTO: 47.4 % (ref 37–47)
HGB BLD-MCNC: 15.5 G/DL (ref 12–16)
IMM GRANULOCYTES # BLD AUTO: 0.03 K/UL (ref 0–0.11)
IMM GRANULOCYTES NFR BLD AUTO: 1 % (ref 0–0.9)
LDH SERPL L TO P-CCNC: 165 U/L (ref 107–266)
LYMPHOCYTES # BLD AUTO: 1.27 K/UL (ref 1–4.8)
LYMPHOCYTES NFR BLD: 43.3 % (ref 22–41)
MCH RBC QN AUTO: 28.4 PG (ref 27–33)
MCHC RBC AUTO-ENTMCNC: 32.7 G/DL (ref 33.6–35)
MCV RBC AUTO: 86.8 FL (ref 81.4–97.8)
MONOCYTES # BLD AUTO: 0.7 K/UL (ref 0–0.85)
MONOCYTES NFR BLD AUTO: 23.9 % (ref 0–13.4)
NEUTROPHILS # BLD AUTO: 0.77 K/UL (ref 2–7.15)
NEUTROPHILS NFR BLD: 26.3 % (ref 44–72)
NRBC # BLD AUTO: 0 K/UL
NRBC BLD-RTO: 0 /100 WBC
PLATELET # BLD AUTO: 210 K/UL (ref 164–446)
PMV BLD AUTO: 10.6 FL (ref 9–12.9)
POTASSIUM SERPL-SCNC: 4.5 MMOL/L (ref 3.6–5.5)
PROT SERPL-MCNC: 7.8 G/DL (ref 6–8.2)
RBC # BLD AUTO: 5.46 M/UL (ref 4.2–5.4)
SODIUM SERPL-SCNC: 136 MMOL/L (ref 135–145)
URATE SERPL-MCNC: 3.2 MG/DL (ref 1.9–8.2)
WBC # BLD AUTO: 2.9 K/UL (ref 4.8–10.8)

## 2023-02-11 PROCEDURE — 36415 COLL VENOUS BLD VENIPUNCTURE: CPT

## 2023-02-11 PROCEDURE — 84550 ASSAY OF BLOOD/URIC ACID: CPT

## 2023-02-11 PROCEDURE — 80053 COMPREHEN METABOLIC PANEL: CPT

## 2023-02-11 PROCEDURE — 83615 LACTATE (LD) (LDH) ENZYME: CPT

## 2023-02-11 PROCEDURE — 85025 COMPLETE CBC W/AUTO DIFF WBC: CPT

## 2023-02-15 ENCOUNTER — HOSPITAL ENCOUNTER (OUTPATIENT)
Dept: LAB | Facility: MEDICAL CENTER | Age: 58
End: 2023-02-15
Attending: INTERNAL MEDICINE
Payer: COMMERCIAL

## 2023-02-15 LAB
ALBUMIN SERPL BCP-MCNC: 4.1 G/DL (ref 3.2–4.9)
ALBUMIN/GLOB SERPL: 1.1 G/DL
ALP SERPL-CCNC: 78 U/L (ref 30–99)
ALT SERPL-CCNC: 25 U/L (ref 2–50)
ANION GAP SERPL CALC-SCNC: 10 MMOL/L (ref 7–16)
AST SERPL-CCNC: 26 U/L (ref 12–45)
BASOPHILS # BLD AUTO: 1.7 % (ref 0–1.8)
BASOPHILS # BLD: 0.07 K/UL (ref 0–0.12)
BILIRUB SERPL-MCNC: 0.6 MG/DL (ref 0.1–1.5)
BUN SERPL-MCNC: 16 MG/DL (ref 8–22)
CALCIUM ALBUM COR SERPL-MCNC: 9.2 MG/DL (ref 8.5–10.5)
CALCIUM SERPL-MCNC: 9.3 MG/DL (ref 8.5–10.5)
CHLORIDE SERPL-SCNC: 107 MMOL/L (ref 96–112)
CO2 SERPL-SCNC: 24 MMOL/L (ref 20–33)
CREAT SERPL-MCNC: 0.63 MG/DL (ref 0.5–1.4)
EOSINOPHIL # BLD AUTO: 0.1 K/UL (ref 0–0.51)
EOSINOPHIL NFR BLD: 2.5 % (ref 0–6.9)
ERYTHROCYTE [DISTWIDTH] IN BLOOD BY AUTOMATED COUNT: 53.8 FL (ref 35.9–50)
GFR SERPLBLD CREATININE-BSD FMLA CKD-EPI: 102 ML/MIN/1.73 M 2
GLOBULIN SER CALC-MCNC: 3.8 G/DL (ref 1.9–3.5)
GLUCOSE SERPL-MCNC: 129 MG/DL (ref 65–99)
HCT VFR BLD AUTO: 48.6 % (ref 37–47)
HGB BLD-MCNC: 15.9 G/DL (ref 12–16)
IMM GRANULOCYTES # BLD AUTO: 0.04 K/UL (ref 0–0.11)
IMM GRANULOCYTES NFR BLD AUTO: 1 % (ref 0–0.9)
LYMPHOCYTES # BLD AUTO: 1.44 K/UL (ref 1–4.8)
LYMPHOCYTES NFR BLD: 35.8 % (ref 22–41)
MCH RBC QN AUTO: 28.7 PG (ref 27–33)
MCHC RBC AUTO-ENTMCNC: 32.7 G/DL (ref 33.6–35)
MCV RBC AUTO: 87.7 FL (ref 81.4–97.8)
MONOCYTES # BLD AUTO: 0.78 K/UL (ref 0–0.85)
MONOCYTES NFR BLD AUTO: 19.4 % (ref 0–13.4)
NEUTROPHILS # BLD AUTO: 1.59 K/UL (ref 2–7.15)
NEUTROPHILS NFR BLD: 39.6 % (ref 44–72)
NRBC # BLD AUTO: 0 K/UL
NRBC BLD-RTO: 0 /100 WBC
PLATELET # BLD AUTO: 249 K/UL (ref 164–446)
PMV BLD AUTO: 11.4 FL (ref 9–12.9)
POTASSIUM SERPL-SCNC: 4.2 MMOL/L (ref 3.6–5.5)
PROT SERPL-MCNC: 7.9 G/DL (ref 6–8.2)
RBC # BLD AUTO: 5.54 M/UL (ref 4.2–5.4)
SODIUM SERPL-SCNC: 141 MMOL/L (ref 135–145)
WBC # BLD AUTO: 4 K/UL (ref 4.8–10.8)

## 2023-02-15 PROCEDURE — 36415 COLL VENOUS BLD VENIPUNCTURE: CPT

## 2023-02-15 PROCEDURE — 85025 COMPLETE CBC W/AUTO DIFF WBC: CPT

## 2023-02-15 PROCEDURE — 80053 COMPREHEN METABOLIC PANEL: CPT

## 2023-02-22 ENCOUNTER — HOSPITAL ENCOUNTER (OUTPATIENT)
Dept: LAB | Facility: MEDICAL CENTER | Age: 58
End: 2023-02-22
Attending: INTERNAL MEDICINE
Payer: COMMERCIAL

## 2023-02-22 LAB
ALBUMIN SERPL BCP-MCNC: 3.9 G/DL (ref 3.2–4.9)
ALBUMIN/GLOB SERPL: 1.1 G/DL
ALP SERPL-CCNC: 77 U/L (ref 30–99)
ALT SERPL-CCNC: 19 U/L (ref 2–50)
ANION GAP SERPL CALC-SCNC: 10 MMOL/L (ref 7–16)
AST SERPL-CCNC: 17 U/L (ref 12–45)
BASOPHILS # BLD AUTO: 0.9 % (ref 0–1.8)
BASOPHILS # BLD: 0.07 K/UL (ref 0–0.12)
BILIRUB SERPL-MCNC: 1 MG/DL (ref 0.1–1.5)
BUN SERPL-MCNC: 18 MG/DL (ref 8–22)
CALCIUM ALBUM COR SERPL-MCNC: 9.2 MG/DL (ref 8.5–10.5)
CALCIUM SERPL-MCNC: 9.1 MG/DL (ref 8.5–10.5)
CHLORIDE SERPL-SCNC: 101 MMOL/L (ref 96–112)
CO2 SERPL-SCNC: 26 MMOL/L (ref 20–33)
COMMENT 1642: NORMAL
CREAT SERPL-MCNC: 0.77 MG/DL (ref 0.5–1.4)
EOSINOPHIL # BLD AUTO: 0.17 K/UL (ref 0–0.51)
EOSINOPHIL NFR BLD: 2.1 % (ref 0–6.9)
ERYTHROCYTE [DISTWIDTH] IN BLOOD BY AUTOMATED COUNT: 52.7 FL (ref 35.9–50)
GFR SERPLBLD CREATININE-BSD FMLA CKD-EPI: 89 ML/MIN/1.73 M 2
GLOBULIN SER CALC-MCNC: 3.4 G/DL (ref 1.9–3.5)
GLUCOSE SERPL-MCNC: 136 MG/DL (ref 65–99)
HCT VFR BLD AUTO: 48.4 % (ref 37–47)
HGB BLD-MCNC: 16.2 G/DL (ref 12–16)
IMM GRANULOCYTES # BLD AUTO: 0.29 K/UL (ref 0–0.11)
IMM GRANULOCYTES NFR BLD AUTO: 3.6 % (ref 0–0.9)
LDH SERPL L TO P-CCNC: 150 U/L (ref 107–266)
LYMPHOCYTES # BLD AUTO: 1.82 K/UL (ref 1–4.8)
LYMPHOCYTES NFR BLD: 22.5 % (ref 22–41)
MCH RBC QN AUTO: 29.1 PG (ref 27–33)
MCHC RBC AUTO-ENTMCNC: 33.5 G/DL (ref 33.6–35)
MCV RBC AUTO: 86.9 FL (ref 81.4–97.8)
MONOCYTES # BLD AUTO: 0.92 K/UL (ref 0–0.85)
MONOCYTES NFR BLD AUTO: 11.4 % (ref 0–13.4)
MORPHOLOGY BLD-IMP: NORMAL
NEUTROPHILS # BLD AUTO: 4.82 K/UL (ref 2–7.15)
NEUTROPHILS NFR BLD: 59.5 % (ref 44–72)
NRBC # BLD AUTO: 0 K/UL
NRBC BLD-RTO: 0 /100 WBC
PLATELET # BLD AUTO: 125 K/UL (ref 164–446)
PLATELET BLD QL SMEAR: NORMAL
PMV BLD AUTO: 12.4 FL (ref 9–12.9)
POTASSIUM SERPL-SCNC: 4.3 MMOL/L (ref 3.6–5.5)
PROT SERPL-MCNC: 7.3 G/DL (ref 6–8.2)
RBC # BLD AUTO: 5.57 M/UL (ref 4.2–5.4)
RBC BLD AUTO: NORMAL
SODIUM SERPL-SCNC: 137 MMOL/L (ref 135–145)
WBC # BLD AUTO: 8.1 K/UL (ref 4.8–10.8)

## 2023-02-22 PROCEDURE — 36415 COLL VENOUS BLD VENIPUNCTURE: CPT

## 2023-02-22 PROCEDURE — 80053 COMPREHEN METABOLIC PANEL: CPT

## 2023-02-22 PROCEDURE — 85025 COMPLETE CBC W/AUTO DIFF WBC: CPT

## 2023-02-22 PROCEDURE — 83615 LACTATE (LD) (LDH) ENZYME: CPT

## 2023-03-01 ENCOUNTER — HOSPITAL ENCOUNTER (OUTPATIENT)
Dept: LAB | Facility: MEDICAL CENTER | Age: 58
End: 2023-03-01
Attending: INTERNAL MEDICINE
Payer: COMMERCIAL

## 2023-03-01 LAB
ALBUMIN SERPL BCP-MCNC: 4.1 G/DL (ref 3.2–4.9)
ALBUMIN/GLOB SERPL: 1.1 G/DL
ALP SERPL-CCNC: 70 U/L (ref 30–99)
ALT SERPL-CCNC: 25 U/L (ref 2–50)
ANION GAP SERPL CALC-SCNC: 9 MMOL/L (ref 7–16)
AST SERPL-CCNC: 24 U/L (ref 12–45)
BASOPHILS # BLD AUTO: 0.7 % (ref 0–1.8)
BASOPHILS # BLD: 0.05 K/UL (ref 0–0.12)
BILIRUB SERPL-MCNC: 0.5 MG/DL (ref 0.1–1.5)
BUN SERPL-MCNC: 17 MG/DL (ref 8–22)
CALCIUM ALBUM COR SERPL-MCNC: 9.3 MG/DL (ref 8.5–10.5)
CALCIUM SERPL-MCNC: 9.4 MG/DL (ref 8.5–10.5)
CHLORIDE SERPL-SCNC: 104 MMOL/L (ref 96–112)
CO2 SERPL-SCNC: 26 MMOL/L (ref 20–33)
CREAT SERPL-MCNC: 0.78 MG/DL (ref 0.5–1.4)
EOSINOPHIL # BLD AUTO: 0.16 K/UL (ref 0–0.51)
EOSINOPHIL NFR BLD: 2.4 % (ref 0–6.9)
ERYTHROCYTE [DISTWIDTH] IN BLOOD BY AUTOMATED COUNT: 52.1 FL (ref 35.9–50)
GFR SERPLBLD CREATININE-BSD FMLA CKD-EPI: 88 ML/MIN/1.73 M 2
GLOBULIN SER CALC-MCNC: 3.6 G/DL (ref 1.9–3.5)
GLUCOSE SERPL-MCNC: 178 MG/DL (ref 65–99)
HCT VFR BLD AUTO: 45.7 % (ref 37–47)
HGB BLD-MCNC: 15.4 G/DL (ref 12–16)
IMM GRANULOCYTES # BLD AUTO: 0.12 K/UL (ref 0–0.11)
IMM GRANULOCYTES NFR BLD AUTO: 1.8 % (ref 0–0.9)
LYMPHOCYTES # BLD AUTO: 1.12 K/UL (ref 1–4.8)
LYMPHOCYTES NFR BLD: 16.8 % (ref 22–41)
MCH RBC QN AUTO: 29.4 PG (ref 27–33)
MCHC RBC AUTO-ENTMCNC: 33.7 G/DL (ref 33.6–35)
MCV RBC AUTO: 87.4 FL (ref 81.4–97.8)
MONOCYTES # BLD AUTO: 0.65 K/UL (ref 0–0.85)
MONOCYTES NFR BLD AUTO: 9.7 % (ref 0–13.4)
NEUTROPHILS # BLD AUTO: 4.58 K/UL (ref 2–7.15)
NEUTROPHILS NFR BLD: 68.6 % (ref 44–72)
NRBC # BLD AUTO: 0 K/UL
NRBC BLD-RTO: 0 /100 WBC
PLATELET # BLD AUTO: 204 K/UL (ref 164–446)
PMV BLD AUTO: 10.6 FL (ref 9–12.9)
POTASSIUM SERPL-SCNC: 3.9 MMOL/L (ref 3.6–5.5)
PROT SERPL-MCNC: 7.7 G/DL (ref 6–8.2)
RBC # BLD AUTO: 5.23 M/UL (ref 4.2–5.4)
SODIUM SERPL-SCNC: 139 MMOL/L (ref 135–145)
WBC # BLD AUTO: 6.7 K/UL (ref 4.8–10.8)

## 2023-03-01 PROCEDURE — 85025 COMPLETE CBC W/AUTO DIFF WBC: CPT

## 2023-03-01 PROCEDURE — 36415 COLL VENOUS BLD VENIPUNCTURE: CPT

## 2023-03-01 PROCEDURE — 80053 COMPREHEN METABOLIC PANEL: CPT

## 2023-03-01 PROCEDURE — 83615 LACTATE (LD) (LDH) ENZYME: CPT

## 2023-03-02 LAB — LDH SERPL L TO P-CCNC: 170 U/L (ref 107–266)

## 2023-03-08 ENCOUNTER — HOSPITAL ENCOUNTER (OUTPATIENT)
Dept: LAB | Facility: MEDICAL CENTER | Age: 58
End: 2023-03-08
Attending: INTERNAL MEDICINE
Payer: COMMERCIAL

## 2023-03-08 LAB
ALBUMIN SERPL BCP-MCNC: 3.8 G/DL (ref 3.2–4.9)
ALBUMIN/GLOB SERPL: 1.2 G/DL
ALP SERPL-CCNC: 65 U/L (ref 30–99)
ALT SERPL-CCNC: 25 U/L (ref 2–50)
ANION GAP SERPL CALC-SCNC: 9 MMOL/L (ref 7–16)
AST SERPL-CCNC: 24 U/L (ref 12–45)
BASOPHILS # BLD AUTO: 0.3 % (ref 0–1.8)
BASOPHILS # BLD: 0.01 K/UL (ref 0–0.12)
BILIRUB SERPL-MCNC: 0.7 MG/DL (ref 0.1–1.5)
BUN SERPL-MCNC: 17 MG/DL (ref 8–22)
CALCIUM ALBUM COR SERPL-MCNC: 9.1 MG/DL (ref 8.5–10.5)
CALCIUM SERPL-MCNC: 8.9 MG/DL (ref 8.5–10.5)
CHLORIDE SERPL-SCNC: 103 MMOL/L (ref 96–112)
CO2 SERPL-SCNC: 26 MMOL/L (ref 20–33)
CREAT SERPL-MCNC: 0.62 MG/DL (ref 0.5–1.4)
EOSINOPHIL # BLD AUTO: 0.2 K/UL (ref 0–0.51)
EOSINOPHIL NFR BLD: 6.2 % (ref 0–6.9)
ERYTHROCYTE [DISTWIDTH] IN BLOOD BY AUTOMATED COUNT: 53.8 FL (ref 35.9–50)
GFR SERPLBLD CREATININE-BSD FMLA CKD-EPI: 103 ML/MIN/1.73 M 2
GLOBULIN SER CALC-MCNC: 3.1 G/DL (ref 1.9–3.5)
GLUCOSE SERPL-MCNC: 157 MG/DL (ref 65–99)
HCT VFR BLD AUTO: 41.4 % (ref 37–47)
HGB BLD-MCNC: 14 G/DL (ref 12–16)
IMM GRANULOCYTES # BLD AUTO: 0.02 K/UL (ref 0–0.11)
IMM GRANULOCYTES NFR BLD AUTO: 0.6 % (ref 0–0.9)
LDH SERPL L TO P-CCNC: 178 U/L (ref 107–266)
LYMPHOCYTES # BLD AUTO: 0.8 K/UL (ref 1–4.8)
LYMPHOCYTES NFR BLD: 24.8 % (ref 22–41)
MCH RBC QN AUTO: 29.6 PG (ref 27–33)
MCHC RBC AUTO-ENTMCNC: 33.8 G/DL (ref 33.6–35)
MCV RBC AUTO: 87.5 FL (ref 81.4–97.8)
MONOCYTES # BLD AUTO: 0.6 K/UL (ref 0–0.85)
MONOCYTES NFR BLD AUTO: 18.6 % (ref 0–13.4)
NEUTROPHILS # BLD AUTO: 1.6 K/UL (ref 2–7.15)
NEUTROPHILS NFR BLD: 49.5 % (ref 44–72)
NRBC # BLD AUTO: 0 K/UL
NRBC BLD-RTO: 0 /100 WBC
PLATELET # BLD AUTO: 141 K/UL (ref 164–446)
PMV BLD AUTO: 10.7 FL (ref 9–12.9)
POTASSIUM SERPL-SCNC: 3.8 MMOL/L (ref 3.6–5.5)
PROT SERPL-MCNC: 6.9 G/DL (ref 6–8.2)
RBC # BLD AUTO: 4.73 M/UL (ref 4.2–5.4)
SODIUM SERPL-SCNC: 138 MMOL/L (ref 135–145)
WBC # BLD AUTO: 3.2 K/UL (ref 4.8–10.8)

## 2023-03-08 PROCEDURE — 80053 COMPREHEN METABOLIC PANEL: CPT

## 2023-03-08 PROCEDURE — 36415 COLL VENOUS BLD VENIPUNCTURE: CPT

## 2023-03-08 PROCEDURE — 83615 LACTATE (LD) (LDH) ENZYME: CPT

## 2023-03-08 PROCEDURE — 85025 COMPLETE CBC W/AUTO DIFF WBC: CPT

## 2023-03-14 ENCOUNTER — HOSPITAL ENCOUNTER (OUTPATIENT)
Dept: LAB | Facility: MEDICAL CENTER | Age: 58
End: 2023-03-14
Attending: INTERNAL MEDICINE
Payer: COMMERCIAL

## 2023-03-14 LAB
ALBUMIN SERPL BCP-MCNC: 3.7 G/DL (ref 3.2–4.9)
ALBUMIN/GLOB SERPL: 1.1 G/DL
ALP SERPL-CCNC: 70 U/L (ref 30–99)
ALT SERPL-CCNC: 24 U/L (ref 2–50)
ANION GAP SERPL CALC-SCNC: 10 MMOL/L (ref 7–16)
AST SERPL-CCNC: 22 U/L (ref 12–45)
BASOPHILS # BLD AUTO: 0.6 % (ref 0–1.8)
BASOPHILS # BLD: 0.03 K/UL (ref 0–0.12)
BILIRUB SERPL-MCNC: 0.7 MG/DL (ref 0.1–1.5)
BUN SERPL-MCNC: 14 MG/DL (ref 8–22)
CALCIUM ALBUM COR SERPL-MCNC: 9.5 MG/DL (ref 8.5–10.5)
CALCIUM SERPL-MCNC: 9.3 MG/DL (ref 8.5–10.5)
CHLORIDE SERPL-SCNC: 106 MMOL/L (ref 96–112)
CO2 SERPL-SCNC: 23 MMOL/L (ref 20–33)
CREAT SERPL-MCNC: 0.72 MG/DL (ref 0.5–1.4)
EOSINOPHIL # BLD AUTO: 0.17 K/UL (ref 0–0.51)
EOSINOPHIL NFR BLD: 3.6 % (ref 0–6.9)
ERYTHROCYTE [DISTWIDTH] IN BLOOD BY AUTOMATED COUNT: 56.3 FL (ref 35.9–50)
GFR SERPLBLD CREATININE-BSD FMLA CKD-EPI: 96 ML/MIN/1.73 M 2
GLOBULIN SER CALC-MCNC: 3.3 G/DL (ref 1.9–3.5)
GLUCOSE SERPL-MCNC: 172 MG/DL (ref 65–99)
HCT VFR BLD AUTO: 42.6 % (ref 37–47)
HGB BLD-MCNC: 14.4 G/DL (ref 12–16)
IMM GRANULOCYTES # BLD AUTO: 0.03 K/UL (ref 0–0.11)
IMM GRANULOCYTES NFR BLD AUTO: 0.6 % (ref 0–0.9)
LDH SERPL L TO P-CCNC: 181 U/L (ref 107–266)
LYMPHOCYTES # BLD AUTO: 1.57 K/UL (ref 1–4.8)
LYMPHOCYTES NFR BLD: 33.2 % (ref 22–41)
MCH RBC QN AUTO: 30 PG (ref 27–33)
MCHC RBC AUTO-ENTMCNC: 33.8 G/DL (ref 33.6–35)
MCV RBC AUTO: 88.8 FL (ref 81.4–97.8)
MONOCYTES # BLD AUTO: 0.81 K/UL (ref 0–0.85)
MONOCYTES NFR BLD AUTO: 17.1 % (ref 0–13.4)
NEUTROPHILS # BLD AUTO: 2.12 K/UL (ref 2–7.15)
NEUTROPHILS NFR BLD: 44.9 % (ref 44–72)
NRBC # BLD AUTO: 0 K/UL
NRBC BLD-RTO: 0 /100 WBC
PLATELET # BLD AUTO: 262 K/UL (ref 164–446)
PMV BLD AUTO: 10.1 FL (ref 9–12.9)
POTASSIUM SERPL-SCNC: 4.2 MMOL/L (ref 3.6–5.5)
PROT SERPL-MCNC: 7 G/DL (ref 6–8.2)
RBC # BLD AUTO: 4.8 M/UL (ref 4.2–5.4)
SODIUM SERPL-SCNC: 139 MMOL/L (ref 135–145)
WBC # BLD AUTO: 4.7 K/UL (ref 4.8–10.8)

## 2023-03-14 PROCEDURE — 83615 LACTATE (LD) (LDH) ENZYME: CPT

## 2023-03-14 PROCEDURE — 85025 COMPLETE CBC W/AUTO DIFF WBC: CPT

## 2023-03-14 PROCEDURE — 36415 COLL VENOUS BLD VENIPUNCTURE: CPT

## 2023-03-14 PROCEDURE — 80053 COMPREHEN METABOLIC PANEL: CPT

## 2023-03-28 ENCOUNTER — PATIENT MESSAGE (OUTPATIENT)
Dept: CARDIOLOGY | Facility: MEDICAL CENTER | Age: 58
End: 2023-03-28
Payer: COMMERCIAL

## 2023-04-03 NOTE — PATIENT COMMUNICATION
See [employer] message from 3.28.23  EKG note placed in appointment book.  Next f/u appointment w/ AB

## 2023-04-10 ENCOUNTER — HOSPITAL ENCOUNTER (OUTPATIENT)
Dept: CARDIOLOGY | Facility: MEDICAL CENTER | Age: 58
End: 2023-04-10
Attending: NURSE PRACTITIONER
Payer: COMMERCIAL

## 2023-04-10 ENCOUNTER — OFFICE VISIT (OUTPATIENT)
Dept: CARDIOLOGY | Facility: MEDICAL CENTER | Age: 58
End: 2023-04-10
Payer: COMMERCIAL

## 2023-04-10 VITALS
SYSTOLIC BLOOD PRESSURE: 128 MMHG | RESPIRATION RATE: 16 BRPM | WEIGHT: 212 LBS | HEIGHT: 66 IN | DIASTOLIC BLOOD PRESSURE: 62 MMHG | BODY MASS INDEX: 34.07 KG/M2 | HEART RATE: 77 BPM | OXYGEN SATURATION: 97 %

## 2023-04-10 DIAGNOSIS — I10 ESSENTIAL HYPERTENSION: ICD-10-CM

## 2023-04-10 DIAGNOSIS — I25.10 CORONARY ARTERY CALCIFICATION SEEN ON CT SCAN: ICD-10-CM

## 2023-04-10 DIAGNOSIS — C55 ENDOMETRIOID ADENOCARCINOMA OF UTERUS (HCC): ICD-10-CM

## 2023-04-10 DIAGNOSIS — E11.9 TYPE 2 DIABETES MELLITUS WITHOUT COMPLICATION, WITH LONG-TERM CURRENT USE OF INSULIN (HCC): ICD-10-CM

## 2023-04-10 DIAGNOSIS — C88.4 MALT LYMPHOMA (HCC): ICD-10-CM

## 2023-04-10 DIAGNOSIS — R00.2 PALPITATIONS: ICD-10-CM

## 2023-04-10 DIAGNOSIS — E78.5 DYSLIPIDEMIA: ICD-10-CM

## 2023-04-10 DIAGNOSIS — Z79.4 TYPE 2 DIABETES MELLITUS WITHOUT COMPLICATION, WITH LONG-TERM CURRENT USE OF INSULIN (HCC): ICD-10-CM

## 2023-04-10 DIAGNOSIS — R06.09 DYSPNEA ON EXERTION: ICD-10-CM

## 2023-04-10 PROCEDURE — 93306 TTE W/DOPPLER COMPLETE: CPT

## 2023-04-10 PROCEDURE — 99214 OFFICE O/P EST MOD 30 MIN: CPT | Performed by: NURSE PRACTITIONER

## 2023-04-10 ASSESSMENT — ENCOUNTER SYMPTOMS
DIZZINESS: 1
PND: 0
FEVER: 0
COUGH: 0
ABDOMINAL PAIN: 0
BRUISES/BLEEDS EASILY: 0
HEADACHES: 0
PALPITATIONS: 1
NAUSEA: 0
LOSS OF CONSCIOUSNESS: 0
CHILLS: 0
ORTHOPNEA: 0
SHORTNESS OF BREATH: 1
INSOMNIA: 0
MYALGIAS: 0

## 2023-04-10 ASSESSMENT — FIBROSIS 4 INDEX: FIB4 SCORE: 0.99

## 2023-04-10 NOTE — PROGRESS NOTES
"Chief Complaint   Patient presents with    Palpitations    Follow-Up    Shortness of Breath    HTN (Controlled)    Hyperlipidemia    Diabetes (Controlled)    Cancer       Subjective     Stefania Serrano is a 57 y.o. female who presents today for annual follow-up of shortness of breath and palpitations.    Stefania is a 57 year old female with history of MAST/pulmonary nodules and endometrial cancer, followed by [facility]. She also has hypertension, hyperlipidemia, and DM, all treated. She has been previously seen by Dr. Ocampo and Florence Welsh PA-C. She did have an echocardiogram in November 2021 which was normal.    More recently, she has been having more palpitations; she is on a research study medication, and has been told \"it can cause cardiac problems.\" She did have an EKG done at [facility] recently that showed some \"potential abnormalities.\"    She is still short of breath, mostly with exertion. No orthopnea or PND. No overt chest pain, pressure or discomfort; occasional heartburn. She does feel skipping and fast heart beats at time. Some dizziness with exertion, and near syncope, but no actual syncope. No LE edema. BP is stable. She has been working hard at losing weight, and is now off of insulin.    Past Medical History:   Diagnosis Date    Acquired hypothyroidism     Arthritis     Right ankle    Asthma     Inhaler for exercise induced -rarely uses    Back pain     Breath shortness     Cancer (HCC) 2019    lymphoma-lungs-chemo immunotherapy    Chickenpox     COPD (chronic obstructive pulmonary disease) (HCC)     Crohns disease     Daytime sleepiness     Essential hypertension     Fatigue     GERD (gastroesophageal reflux disease)     High cholesterol     Hypothyroidism     IBD (inflammatory bowel disease) 06/14/2021    Chron's    Influenza     Insomnia     Lung cancer (HCC)     MALT lymphoma (HCC)     Followed by [facility]    Obesity     Pulmonary emphysema (HCC)     Pulmonary nodules     Restless leg syndrome  "    Shortness of breath     Type 2 diabetes mellitus without complication, with long-term current use of insulin (HCC)     Wears glasses     Wheezing      Past Surgical History:   Procedure Laterality Date    PB PALMAR FASCIECTOMY Left 3/3/2022    Procedure: LEFT INDEX FINGER FASCIECTOMY;  Surgeon: Brien Villagomez M.D.;  Location: SURGERY SAME DAY [facility];  Service: Orthopedics    GASTRIC BYPASS LAPAROSCOPIC  2016    sleeve    ABDOMINAL HYSTERECTOMY TOTAL      NADER + SBO    BRONCHOSCOPY      CARPAL TUNNEL RELEASE      CHOLECYSTECTOMY      LUNG BIOPSY OPEN      SLEEVE,JESSICA VASO THIGH       Family History   Problem Relation Age of Onset    No Known Problems Other     Heart Disease Mother         rhuematic fever, valve replacement    Breast Cancer Mother     Heart Failure Father      Social History     Socioeconomic History    Marital status: Single     Spouse name: Not on file    Number of children: Not on file    Years of education: Not on file    Highest education level: Not on file   Occupational History    Not on file   Tobacco Use    Smoking status: Never    Smokeless tobacco: Never   Vaping Use    Vaping Use: Never used   Substance and Sexual Activity    Alcohol use: Yes     Alcohol/week: 0.0 oz     Comment: 1 to 2 drinks/year    Drug use: Never    Sexual activity: Not on file   Other Topics Concern    Not on file   Social History Narrative    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      Social Determinants of Health     Financial Resource Strain: Not on file   Food Insecurity: Not on file   Transportation Needs: Not on file   Physical Activity: Not on file   Stress: Not on file   Social Connections: Not on file   Intimate Partner Violence: Not on file   Housing Stability: Not on file     Allergies   Allergen Reactions    Paclitaxel Anaphylaxis     Tongue swelling, difficulty breathing, felt like she could have      Outpatient Encounter Medications as of 4/10/2023   Medication Sig  Dispense Refill    Continuous Blood Gluc Sensor (FREESTYLE SAADIA 2 SENSOR) Misc APPLY 1 SENSOR TOPICALLY EVERY 14 DAYS AS DIRECTED      XIGDUO XR 5-1000 MG TABLET SR 24 HR Take 1 Tablet by mouth 2 times a day.      pioglitazone (ACTOS) 30 MG Tab Take 1 Tablet by mouth every day.      MOUNJARO 7.5 MG/0.5ML Solution Pen-injector Inject 7.5 mg as directed.      ramipril (ALTACE) 2.5 MG Cap Take 1 Capsule by mouth every day. 90 Capsule 3    pantoprazole (PROTONIX) 20 MG tablet Take 1 Tablet by mouth every day. 90 Tablet 3    levothyroxine (SYNTHROID) 100 MCG Tab Take 1 Tablet by mouth every morning on an empty stomach. 90 Tablet 3    atorvastatin (LIPITOR) 40 MG Tab Take 1 Tablet by mouth at bedtime. 90 Tablet 3    ONETOUCH VERIO strip       therapeutic multivitamin-minerals (THERAGRAN-M) Tab Take 1 Tablet by mouth every day.      Blood Glucose Test Strips Use one One Touch Verio Flex strip to test blood sugar 4 times daily. 400 Strip 0    [DISCONTINUED] Insulin Degludec (TRESIBA FLEXTOUCH) 100 UNIT/ML Solution Pen-injector Inject 30 Units under the skin every morning. (Patient not taking: Reported on 4/10/2023) 1800 mL 3    [DISCONTINUED] Insulin Lispro (HUMALOG KWIKPEN) 200 UNIT/ML Solution Pen-injector Inject 15 Units under the skin 4 Times a Day:  Before Meals and at Bedtime. (Patient not taking: Reported on 4/10/2023) 21 mL 1    [DISCONTINUED] Lidocaine 4 % Patch Apply patch to painful area. Patch may remain in place for up to 12 hours in any 24-hour period. No more than 1 patch can be used in a 24-hour period. (Patient not taking: Reported on 4/10/2023) 15 Patch 0     No facility-administered encounter medications on file as of 4/10/2023.     Review of Systems   Constitutional:  Positive for malaise/fatigue. Negative for chills and fever.   HENT:  Negative for congestion.    Respiratory:  Positive for shortness of breath. Negative for cough.    Cardiovascular:  Positive for palpitations. Negative for chest pain,  "orthopnea, leg swelling and PND.   Gastrointestinal:  Negative for abdominal pain and nausea.   Musculoskeletal:  Positive for joint pain. Negative for myalgias.   Skin:  Negative for rash.   Neurological:  Positive for dizziness. Negative for loss of consciousness and headaches.   Endo/Heme/Allergies:  Does not bruise/bleed easily.   Psychiatric/Behavioral:  The patient does not have insomnia.             Objective     /62 (BP Location: Left arm, Patient Position: Sitting, BP Cuff Size: Adult)   Pulse 77   Resp 16   Ht 1.676 m (5' 6\")   Wt 96.2 kg (212 lb)   SpO2 97%   BMI 34.22 kg/m²     Physical Exam  Constitutional:       Appearance: She is well-developed.   HENT:      Head: Normocephalic.   Neck:      Vascular: No JVD.   Cardiovascular:      Rate and Rhythm: Normal rate and regular rhythm.      Heart sounds: Normal heart sounds.   Pulmonary:      Effort: Pulmonary effort is normal. No respiratory distress.      Breath sounds: Normal breath sounds. No wheezing or rales.   Abdominal:      General: Bowel sounds are normal. There is no distension.      Palpations: Abdomen is soft.      Tenderness: There is no abdominal tenderness.   Musculoskeletal:         General: Normal range of motion.      Cervical back: Normal range of motion and neck supple.   Skin:     General: Skin is warm and dry.      Findings: No rash.   Neurological:      Mental Status: She is alert and oriented to person, place, and time.   Psychiatric:         Mood and Affect: Mood normal.         Behavior: Behavior normal.     IMPRESSION OF PET/CT SCAN OF NECK TO PELVIX OF 3/25/2023:  1. Except for the lesion in the anterior aspect of the right upper   lobe, the other bilateral pulmonary lesions are similar or have decreased in size and/or FDG avidity. Deauville 5-point PET score of 3, based on the right upper lobe pulmonary opacity with radiotracer uptake greater than mediastinal blood pool but less than liver background. " Atherosclerotic calcifications of the aorta. Coronary calcifications.   2. Intense diffuse radiotracer uptake in the colon, possibly related to   Chron disease, possibly related to metformin use. Clinical correlation and   attention on follow up are recommended.   3. Asymmetric radiotracer uptake in the right parotid on tonsil.   Clinical correlation and attention on follow-up are recommended      CONCLUSIONS OF TTE OF 11/29/2021:  No prior study is available for comparison.   Normal left ventricular size, wall thickness, diastolic and systolic   function.  Left ventricular ejection fraction is estimated to be 65%.  Normal right ventricular size and systolic function.  No hemodynamically significant valvular heart disease noted.  Normal inferior vena cava size and inspiratory collapse.    INTERPRETATION OF EKG OF 3/21/2023 ([facility]):  Normal sinus rhythm  Right superior axis deviation  Possible Inferior infarct , age undetermined  Abnormal ECG  When compared with ECG of 09-DEC-2022 12:26,  Left anterior fascicular block is no longer Present  Minimal criteria for Inferior infarct is now Present  T wave inversion now evident in Lateral leads    Lab Results   Component Value Date/Time    CHOLSTRLTOT 168 10/31/2022 08:19 AM    LDL 79 10/31/2022 08:19 AM    HDL 58 10/31/2022 08:19 AM    TRIGLYCERIDE 155 (H) 10/31/2022 08:19 AM        Lab Results   Component Value Date/Time    SODIUM 139 03/14/2023 02:26 PM    POTASSIUM 4.2 03/14/2023 02:26 PM    CHLORIDE 106 03/14/2023 02:26 PM    CO2 23 03/14/2023 02:26 PM    GLUCOSE 172 (H) 03/14/2023 02:26 PM    BUN 14 03/14/2023 02:26 PM    CREATININE 0.72 03/14/2023 02:26 PM      Lab Results   Component Value Date/Time    WBC 4.7 (L) 03/14/2023 02:26 PM    RBC 4.80 03/14/2023 02:26 PM    HEMOGLOBIN 14.4 03/14/2023 02:26 PM    HEMATOCRIT 42.6 03/14/2023 02:26 PM    MCV 88.8 03/14/2023 02:26 PM    MCH 30.0 03/14/2023 02:26 PM    MCHC 33.8 03/14/2023 02:26 PM    MPV 10.1 03/14/2023  02:26 PM       Lab Results   Component Value Date/Time    ASTSGOT 22 03/14/2023 02:26 PM    ALTSGPT 24 03/14/2023 02:26 PM         Assessment & Plan     1. Palpitations  Holter Monitor Study    EC-ECHOCARDIOGRAM COMPLETE W/O CONT      2. Dyspnea on exertion        3. Essential hypertension        4. Dyslipidemia        5. Coronary artery calcification seen on CT scan        6. DM-2, Type 2 diabetes mellitus without complication, with long-term current use of insulin (HCC)        7. MALT lymphoma (HCC)        8. Endometrioid adenocarcinoma of uterus (HCC)            Medical Decision Making: Today's Assessment/Status/Plan:      1. Palpitations with dyspnea on exertion, possibly due to cancer medications. To obtain ZioPatch and repeat echocardiogram. She is agreeable.    2. Hypertension, treated with Altace 2.5mg once daily. BP is well controlled.    3. Hyperlipidemia and coronary/aortic calcification seen on CT scan treated with Lipitor 40mg.    4. Diabetes mellitus, treated/stable.    5. MALT lymphoma and endometrial cancer, followed by [facility], monitored regularly.    As above, to obtain ZioPatch and repeat echo. We will make changes based on these results. Keep follow-up with other providers too.

## 2023-04-10 NOTE — PROGRESS NOTES
Patient enrolled in 14 day Zio (XT/AT)  Zio Patch program per Laura Odonnell.  In clinic hookup.  >Currently pending EOS.  Monitor serial number: [id number]

## 2023-04-11 ENCOUNTER — HOSPITAL ENCOUNTER (OUTPATIENT)
Dept: LAB | Facility: MEDICAL CENTER | Age: 58
End: 2023-04-11
Attending: INTERNAL MEDICINE
Payer: COMMERCIAL

## 2023-04-11 LAB
ALBUMIN SERPL BCP-MCNC: 3.7 G/DL (ref 3.2–4.9)
ALBUMIN/GLOB SERPL: 1 G/DL
ALP SERPL-CCNC: 64 U/L (ref 30–99)
ALT SERPL-CCNC: 16 U/L (ref 2–50)
ANION GAP SERPL CALC-SCNC: 9 MMOL/L (ref 7–16)
AST SERPL-CCNC: 16 U/L (ref 12–45)
BASOPHILS # BLD AUTO: 0.2 % (ref 0–1.8)
BASOPHILS # BLD: 0.01 K/UL (ref 0–0.12)
BILIRUB SERPL-MCNC: 0.6 MG/DL (ref 0.1–1.5)
BUN SERPL-MCNC: 17 MG/DL (ref 8–22)
CALCIUM ALBUM COR SERPL-MCNC: 9.2 MG/DL (ref 8.5–10.5)
CALCIUM SERPL-MCNC: 9 MG/DL (ref 8.5–10.5)
CHLORIDE SERPL-SCNC: 108 MMOL/L (ref 96–112)
CO2 SERPL-SCNC: 26 MMOL/L (ref 20–33)
CREAT SERPL-MCNC: 0.61 MG/DL (ref 0.5–1.4)
EOSINOPHIL # BLD AUTO: 0.45 K/UL (ref 0–0.51)
EOSINOPHIL NFR BLD: 9.6 % (ref 0–6.9)
ERYTHROCYTE [DISTWIDTH] IN BLOOD BY AUTOMATED COUNT: 53.7 FL (ref 35.9–50)
GFR SERPLBLD CREATININE-BSD FMLA CKD-EPI: 103 ML/MIN/1.73 M 2
GLOBULIN SER CALC-MCNC: 3.7 G/DL (ref 1.9–3.5)
GLUCOSE SERPL-MCNC: 143 MG/DL (ref 65–99)
HCT VFR BLD AUTO: 45.1 % (ref 37–47)
HGB BLD-MCNC: 15 G/DL (ref 12–16)
IMM GRANULOCYTES # BLD AUTO: 0.03 K/UL (ref 0–0.11)
IMM GRANULOCYTES NFR BLD AUTO: 0.6 % (ref 0–0.9)
LDH SERPL L TO P-CCNC: 153 U/L (ref 107–266)
LV EJECT FRACT MOD 2C 99903: 40.84
LV EJECT FRACT MOD 4C 99902: 65.27
LV EJECT FRACT MOD BP 99901: 54.99
LYMPHOCYTES # BLD AUTO: 1.09 K/UL (ref 1–4.8)
LYMPHOCYTES NFR BLD: 23.2 % (ref 22–41)
MCH RBC QN AUTO: 30.4 PG (ref 27–33)
MCHC RBC AUTO-ENTMCNC: 33.3 G/DL (ref 33.6–35)
MCV RBC AUTO: 91.5 FL (ref 81.4–97.8)
MONOCYTES # BLD AUTO: 0.26 K/UL (ref 0–0.85)
MONOCYTES NFR BLD AUTO: 5.5 % (ref 0–13.4)
NEUTROPHILS # BLD AUTO: 2.86 K/UL (ref 2–7.15)
NEUTROPHILS NFR BLD: 60.9 % (ref 44–72)
NRBC # BLD AUTO: 0 K/UL
NRBC BLD-RTO: 0 /100 WBC
PLATELET # BLD AUTO: 262 K/UL (ref 164–446)
PMV BLD AUTO: 10.8 FL (ref 9–12.9)
POTASSIUM SERPL-SCNC: 3.9 MMOL/L (ref 3.6–5.5)
PROT SERPL-MCNC: 7.4 G/DL (ref 6–8.2)
RBC # BLD AUTO: 4.93 M/UL (ref 4.2–5.4)
SODIUM SERPL-SCNC: 143 MMOL/L (ref 135–145)
WBC # BLD AUTO: 4.7 K/UL (ref 4.8–10.8)

## 2023-04-11 PROCEDURE — 80053 COMPREHEN METABOLIC PANEL: CPT

## 2023-04-11 PROCEDURE — 83615 LACTATE (LD) (LDH) ENZYME: CPT

## 2023-04-11 PROCEDURE — 85025 COMPLETE CBC W/AUTO DIFF WBC: CPT

## 2023-04-11 PROCEDURE — 36415 COLL VENOUS BLD VENIPUNCTURE: CPT

## 2023-04-11 PROCEDURE — 93306 TTE W/DOPPLER COMPLETE: CPT | Mod: 26 | Performed by: INTERNAL MEDICINE

## 2023-05-03 ENCOUNTER — TELEPHONE (OUTPATIENT)
Dept: CARDIOLOGY | Facility: MEDICAL CENTER | Age: 58
End: 2023-05-03
Payer: COMMERCIAL

## 2023-05-09 ENCOUNTER — HOSPITAL ENCOUNTER (OUTPATIENT)
Dept: LAB | Facility: MEDICAL CENTER | Age: 58
End: 2023-05-09
Attending: INTERNAL MEDICINE
Payer: COMMERCIAL

## 2023-05-09 LAB
ALBUMIN SERPL BCP-MCNC: 3.8 G/DL (ref 3.2–4.9)
ALBUMIN/GLOB SERPL: 1 G/DL
ALP SERPL-CCNC: 81 U/L (ref 30–99)
ALT SERPL-CCNC: 21 U/L (ref 2–50)
ANION GAP SERPL CALC-SCNC: 9 MMOL/L (ref 7–16)
AST SERPL-CCNC: 22 U/L (ref 12–45)
BILIRUB SERPL-MCNC: 0.6 MG/DL (ref 0.1–1.5)
BUN SERPL-MCNC: 19 MG/DL (ref 8–22)
CALCIUM ALBUM COR SERPL-MCNC: 9.6 MG/DL (ref 8.5–10.5)
CALCIUM SERPL-MCNC: 9.4 MG/DL (ref 8.5–10.5)
CHLORIDE SERPL-SCNC: 105 MMOL/L (ref 96–112)
CO2 SERPL-SCNC: 25 MMOL/L (ref 20–33)
CREAT SERPL-MCNC: 0.7 MG/DL (ref 0.5–1.4)
GFR SERPLBLD CREATININE-BSD FMLA CKD-EPI: 100 ML/MIN/1.73 M 2
GLOBULIN SER CALC-MCNC: 4 G/DL (ref 1.9–3.5)
GLUCOSE SERPL-MCNC: 156 MG/DL (ref 65–99)
POTASSIUM SERPL-SCNC: 4 MMOL/L (ref 3.6–5.5)
PROT SERPL-MCNC: 7.8 G/DL (ref 6–8.2)
SODIUM SERPL-SCNC: 139 MMOL/L (ref 135–145)
URATE SERPL-MCNC: 2.7 MG/DL (ref 1.9–8.2)

## 2023-05-09 PROCEDURE — 84550 ASSAY OF BLOOD/URIC ACID: CPT

## 2023-05-09 PROCEDURE — 80053 COMPREHEN METABOLIC PANEL: CPT

## 2023-05-09 PROCEDURE — 36415 COLL VENOUS BLD VENIPUNCTURE: CPT

## 2023-05-23 ENCOUNTER — TELEMEDICINE (OUTPATIENT)
Dept: CARDIOLOGY | Facility: MEDICAL CENTER | Age: 58
End: 2023-05-23
Attending: INTERNAL MEDICINE
Payer: COMMERCIAL

## 2023-05-23 VITALS — BODY MASS INDEX: 32.14 KG/M2 | HEIGHT: 66 IN | WEIGHT: 200 LBS

## 2023-05-23 DIAGNOSIS — Z79.4 TYPE 2 DIABETES MELLITUS WITHOUT COMPLICATION, WITH LONG-TERM CURRENT USE OF INSULIN (HCC): ICD-10-CM

## 2023-05-23 DIAGNOSIS — I10 ESSENTIAL HYPERTENSION: ICD-10-CM

## 2023-05-23 DIAGNOSIS — E11.9 TYPE 2 DIABETES MELLITUS WITHOUT COMPLICATION, WITH LONG-TERM CURRENT USE OF INSULIN (HCC): ICD-10-CM

## 2023-05-23 DIAGNOSIS — I25.10 CORONARY ARTERY CALCIFICATION SEEN ON CT SCAN: ICD-10-CM

## 2023-05-23 DIAGNOSIS — E78.5 DYSLIPIDEMIA: ICD-10-CM

## 2023-05-23 DIAGNOSIS — R00.2 PALPITATIONS: ICD-10-CM

## 2023-05-23 PROCEDURE — 99213 OFFICE O/P EST LOW 20 MIN: CPT | Performed by: INTERNAL MEDICINE

## 2023-05-23 PROCEDURE — 99214 OFFICE O/P EST MOD 30 MIN: CPT | Mod: 95 | Performed by: INTERNAL MEDICINE

## 2023-05-23 RX ORDER — ACETAMINOPHEN 500 MG
1 TABLET ORAL EVERY 6 HOURS PRN
COMMUNITY
Start: 2023-01-05 | End: 2023-05-25

## 2023-05-23 RX ORDER — PSEUDOEPHEDRINE HCL 30 MG
100 TABLET ORAL
COMMUNITY
Start: 2023-05-12

## 2023-05-23 RX ORDER — ASPIRIN 81 MG/1
1 TABLET, CHEWABLE ORAL
COMMUNITY
Start: 2022-12-29 | End: 2023-12-24

## 2023-05-23 RX ORDER — PROCHLORPERAZINE MALEATE 10 MG
10 TABLET ORAL 3 TIMES DAILY PRN
COMMUNITY
Start: 2023-02-25

## 2023-05-23 RX ORDER — FILGRASTIM-SNDZ 480 UG/.8ML
480 INJECTION, SOLUTION INTRAVENOUS; SUBCUTANEOUS
COMMUNITY
Start: 2023-02-10 | End: 2023-08-09

## 2023-05-23 RX ORDER — LORAZEPAM 0.5 MG/1
0.5 TABLET ORAL EVERY 8 HOURS PRN
COMMUNITY
Start: 2023-03-29

## 2023-05-23 RX ORDER — OLANZAPINE 5 MG/1
1 TABLET ORAL
COMMUNITY
Start: 2023-05-22 | End: 2023-05-25

## 2023-05-23 RX ORDER — ACYCLOVIR 400 MG/1
TABLET ORAL
COMMUNITY
Start: 2023-05-21 | End: 2023-05-25

## 2023-05-23 RX ORDER — OLANZAPINE 5 MG/1
TABLET ORAL
COMMUNITY
Start: 2023-05-22

## 2023-05-23 RX ORDER — SENNOSIDES A AND B 8.6 MG/1
8.6 TABLET, FILM COATED ORAL
COMMUNITY
Start: 2023-05-12

## 2023-05-23 RX ORDER — POLYETHYLENE GLYCOL 3350 17 G/17G
POWDER, FOR SOLUTION ORAL
COMMUNITY
Start: 2023-05-12

## 2023-05-23 RX ORDER — COLESEVELAM 180 1/1
1 TABLET ORAL 2 TIMES DAILY WITH MEALS
COMMUNITY
Start: 2023-01-20 | End: 2024-01-15

## 2023-05-23 RX ORDER — LENALIDOMIDE 10 MG/1
CAPSULE ORAL
COMMUNITY

## 2023-05-23 RX ORDER — PANTOPRAZOLE SODIUM 40 MG/1
TABLET, DELAYED RELEASE ORAL
COMMUNITY
Start: 2023-05-21

## 2023-05-23 RX ORDER — ONDANSETRON 4 MG/1
TABLET, ORALLY DISINTEGRATING ORAL
COMMUNITY
Start: 2023-05-17

## 2023-05-23 RX ORDER — INSULIN DEGLUDEC 100 U/ML
INJECTION, SOLUTION SUBCUTANEOUS
COMMUNITY
Start: 2023-05-04 | End: 2023-05-25

## 2023-05-23 RX ORDER — ONDANSETRON HYDROCHLORIDE 8 MG/1
TABLET, FILM COATED ORAL
COMMUNITY
Start: 2023-03-19 | End: 2023-05-25

## 2023-05-23 ASSESSMENT — FIBROSIS 4 INDEX: FIB4 SCORE: 1.08

## 2023-05-23 NOTE — PROGRESS NOTES
Cardiology Virtual Follow-up Consultation Note    Date of note:  5/23/2023  Primary Care Provider: Luis Eduardo Hamm M.D.  Referring Provider: Luis Eduardo Hamm M.D.     Patient Name: Jaimee Serrano   YOB: 1965  MRN:              [id number]    Chief Complaint: fatigue    History of Present Illness: Jaimee Serrano is a 58 y.o. female whose current medical problems are included below who is here for follow-up.     HPI per oncology notes (Dr. 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 [place] [facility] [place] the care of Dr. Phil Lackey. She had CT imaging in 09/2019 which showed multiple bilateral large lung nodules (RUL 5.2 x 2.5cm, SUV max 2.9; RLL basal nodule up to 2.4cm, SUV max 3.5; LLL dominant nodule up to 3.5cm, SUV max 2.4) on a background of large pulmonary bullae. Prior to this she states she also had a CT A/P for Crohn's which caught abnormalities at her lung bases.     PET/CT was done on 05/30/19 which demonstrated multiple bilateral pulmonary lesions with low level FDG avidity (dominant nodule w/in the RUL 5.2 x 2.5cm, SUV max 2.9; RLL basal nodule up to 2.4cm, SUV max 3.5; LLL dominant nodule up to 3.5cm, SW max 2.4). On 7/31/19 she underwent CT guided FNA of the BARBARA; pathology was negative for malignant cells, showed few lymphocytes and histiocytes.     On 9/18/19 the patient underwent right lung bronchoscopic biopsy which was negative for metastases. On 11/19/19 she underwent VATS left lower lobe wedge biopsies which confirmed the diagnosis of a BALT lymphoma. The neoplastic cells expressed CD20, BCL2, and did not express Cyclin Dl, or CD43. Numerous + plasma cells are seen between the follicles and show no light-chain restriction; HHV8 and DICKSON negative.    On 9/28/2019 she  "underwent total abdominal hysterectomy, partial vaginectomy with para-aortic/pelvio lymph node biopsy with removal ovaries/tubes and sentinel node Injection procedure. Pathology showed grade 2 endometrioid carcinoma and isolated tumor cells (around 30 cells) was present in 1 of 1 lymph node.    She was started on Carbo/Taxol for endometrial adenocarcinoma, with the addition of rituximab for BALT lymphoma. She completed 6 cycles from 12/11/19 to 3/27/20. Post-treatment CT on 4/10/20 showed significant decreased in the ill-defined opacities in the LLL and RLL. Since completing therapy she has been on surveillance and followed by both medical oncology/hematology and gyn onc. She moved from [place] to [place] last year, and was seen at [facility]. She reports last having CT imaging in February 2021.     Regarding her Crohn's disease, she was diagnosed at age 31, and has been off of all therapy for the past eight years. She had been on various different medications until that point, and reports multiple side effects and liver injury. She reports her disease is under better control now. She was also diagnosed with hypothyroidism in her 20s.     She recently relocated to the [facility] for work. Her main symptom currently is fatigue and dyspnea with exertion. She has noticed decreased exercise tolerance since starting chemotherapy, which remains unchanged. She also endorses infrequent mild cough. She denies any fevers, drenching night sweats, weight loss, dyspnea at rest, abdominal pain, n/v/d, or other symptoms. She recently established oncologic care with Dr. Yohan Trejo at [facility], and also saw someone in gynecologic oncology for endometrial cancer surveillance.      At our initial visit, 11/3/2021, we discussed the following cardiac related history:   She reports once every couple weeks she experiences some mild to moderate severity substernal palpitations like a \"glug glug\" that lasts about 1 minute and then " resolves. Not exertional.     She also feels worsening fatigue, and ALLISON. She gets exhausted with almost any activity including grocery shopping and then she needs to rest.     Interim Events:  On chemo, worried about potential cardiac toxicity.     Moderate severity palpitations 1-2 times a week lasting a couple minutes, resolves spontaneously.     Occasional heart burn.     Unchanged SOB. Overall improved exercise tolerance.     Brianopatch personally reviewed, was completley normal sinus rhythm during her 12 symptoms. Did not wear this right after her chemo.       ROS    No bleeding issues, no CVA symptoms.       Past Medical History:   Diagnosis Date    Acquired hypothyroidism     Arthritis     Right ankle    Asthma     Inhaler for exercise induced -rarely uses    Back pain     Breath shortness     Cancer (HCC) 2019    lymphoma-lungs-chemo immunotherapy    Chickenpox     COPD (chronic obstructive pulmonary disease) (HCC)     Crohns disease     Daytime sleepiness     Essential hypertension     Fatigue     GERD (gastroesophageal reflux disease)     High cholesterol     Hypothyroidism     IBD (inflammatory bowel disease) 06/14/2021    Chron's    Influenza     Insomnia     Lung cancer (HCC)     MALT lymphoma (HCC)     Followed by [facility]    Obesity     Pulmonary emphysema (HCC)     Pulmonary nodules     Restless leg syndrome     Shortness of breath     Type 2 diabetes mellitus without complication, with long-term current use of insulin (HCC)     Wears glasses     Wheezing          Past Surgical History:   Procedure Laterality Date    PB PALMAR FASCIECTOMY Left 3/3/2022    Procedure: LEFT INDEX FINGER FASCIECTOMY;  Surgeon: Brien Villagomez M.D.;  [facility];  Service: Orthopedics    GASTRIC BYPASS LAPAROSCOPIC  2016    sleeve    ABDOMINAL HYSTERECTOMY TOTAL      NADER + SBO    BRONCHOSCOPY      CARPAL TUNNEL RELEASE      CHOLECYSTECTOMY      LUNG BIOPSY OPEN      SLEEVE,JESSICA VASO THIGH            Current Outpatient Medications   Medication Sig Dispense Refill    azaCITIDine 200 MG Tab Take  by mouth.      Lenalidomide 10 MG Cap Take  by mouth.      Continuous Blood Gluc Sensor (FREESTYLE SAADIA 2 SENSOR) Mis APPLY 1 SENSOR TOPICALLY EVERY 14 DAYS AS DIRECTED      XIGDUO XR 5-1000 MG TABLET SR 24 HR Take 1 Tablet by mouth 2 times a day.      pioglitazone (ACTOS) 30 MG Tab Take 1 Tablet by mouth every day.      MOUNJARO 7.5 MG/0.5ML Solution Pen-injector Inject 7.5 mg as directed.      ramipril (ALTACE) 2.5 MG Cap Take 1 Capsule by mouth every day. 90 Capsule 3    pantoprazole (PROTONIX) 20 MG tablet Take 1 Tablet by mouth every day. 90 Tablet 3    levothyroxine (SYNTHROID) 100 MCG Tab Take 1 Tablet by mouth every morning on an empty stomach. 90 Tablet 3    atorvastatin (LIPITOR) 40 MG Tab Take 1 Tablet by mouth at bedtime. 90 Tablet 3    ONETOUCH VERIO strip       therapeutic multivitamin-minerals (THERAGRAN-M) Tab Take 1 Tablet by mouth every day.      Blood Glucose Test Strips Use one One Touch Verio Flex strip to test blood sugar 4 times daily. 400 Strip 0     No current facility-administered medications for this visit.         Allergies   Allergen Reactions    Paclitaxel Anaphylaxis     Tongue swelling, difficulty breathing, felt like she could have          Family History   Problem Relation Age of Onset    No Known Problems Other     Heart Disease Mother         rhuematic fever, valve replacement    Breast Cancer Mother     Heart Failure Father          Social History     Socioeconomic History    Marital status: Single     Spouse name: Not on file    Number of children: Not on file    Years of education: Not on file    Highest education level: Not on file   Occupational History    Not on file   Tobacco Use    Smoking status: Never    Smokeless tobacco: Never   Vaping Use    Vaping Use: Never used   Substance and Sexual Activity    Alcohol use: Yes     Alcohol/week: 0.0 oz     Comment: 1 to 2  "drinks/year    Drug use: Never    Sexual activity: Not on file   Other Topics Concern    Not on file   Social History Narrative    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      Social Determinants of Health     Financial Resource Strain: Not on file   Food Insecurity: Not on file   Transportation Needs: Not on file   Physical Activity: Not on file   Stress: Not on file   Social Connections: Not on file   Intimate Partner Violence: Not on file   Housing Stability: Not on file         Physical Exam:  Ambulatory Vitals  Ht 1.676 m (5' 6\")   Wt 90.7 kg (200 lb)    Oxygen Therapy:  Pulse Oximetry:  (no vitals provided)  BP Readings from Last 4 Encounters:   04/10/23 128/62   11/11/22 132/88   11/10/22 110/70   08/11/22 116/68       Weight/BMI: Body mass index is 32.28 kg/m².  Wt Readings from Last 4 Encounters:   05/23/23 90.7 kg (200 lb)   04/10/23 96.2 kg (212 lb)   12/01/22 107 kg (235 lb)   11/11/22 110 kg (242 lb)     General: No apparent distress  Eyes: nl conjunctiva  Neck: JVP appeared normal from afar  Lungs: normal respiratory effort  Neurological: Moving upper extremities.   Psychiatric: Appropriate affect, A/O x 3, intact judgement and insight  Skin: no visible rashes        Lab Data Review:  Lab Results   Component Value Date/Time    CHOLSTRLTOT 168 10/31/2022 08:19 AM    LDL 79 10/31/2022 08:19 AM    HDL 58 10/31/2022 08:19 AM    TRIGLYCERIDE 155 (H) 10/31/2022 08:19 AM       Lab Results   Component Value Date/Time    SODIUM 139 05/09/2023 02:56 PM    POTASSIUM 4.0 05/09/2023 02:56 PM    CHLORIDE 105 05/09/2023 02:56 PM    CO2 25 05/09/2023 02:56 PM    GLUCOSE 156 (H) 05/09/2023 02:56 PM    BUN 19 05/09/2023 02:56 PM    CREATININE 0.70 05/09/2023 02:56 PM     Lab Results   Component Value Date/Time    ALKPHOSPHAT 81 05/09/2023 02:56 PM    ASTSGOT 22 05/09/2023 02:56 PM    ALTSGPT 21 05/09/2023 02:56 PM    TBILIRUBIN 0.6 05/09/2023 02:56 PM      Lab Results   Component Value Date/Time "    WBC 4.7 (L) 04/11/2023 02:43 PM     No components found for: HBGA1C  No components found for: TROPONIN  No results found for: BNP      Cardiac Imaging and Procedures Review:    EKG dated 11/3/2021 : My personal interpretation is NSR, poor r wave progression.     Echo dated 4/2023:  CONCLUSIONS  Technically difficult study.   Grossly normal left ventricular systolic function.  Image quality is supotimal to exclude specific wall motion   abnormalities.  Mildly dilated right ventricle.  Normal right ventricular systolic function.  No evidence of valvular abnormality based on Doppler evaluation.   Recommend repeat limited study with contrast to better assess left   ventricular function and wall motion and aortic valve    Holter 5/2023:  Short runs of supraventricular tachycardia that do not meet criteria for diagnosis of atrial flutter or atrial fibrillation.   Rare premature atrial complexes (<1%) and premature ventricular complexes (<1%).   No malignant arrhythmias identified.   No sinus pause.   No significant AV block.     Medical Decision Making:    Per previous onc notes:  Jaimee Serrano is a 56 year old woman with recent history of endometrial adenocarcinoma and incidentally found BALT lymphoma on staging imaging, s/p total abdominal hysterectomy, partial vaginectomy with para-aortic/pelvio lymph node biopsy and salpingoophercetomy, s/p 6 cycles of carbo/taxol with the addition of rituximab for the BALT, completed 03/27/20. Repeat CT Chest following completion of therapy in 04/2020 showed significant improvement in lungs. She has been under surveillance for both diagnoses since then.     She has stable symptoms of dyspnea on exertion and decreased exercise tolerance, however fatigue is now worsening. PET concerning for disease recurrence in the lung. An FNA of a lymph node showed lymphocytes suspicious for B-cell lymphoma; CD20+, CD5-, CD10-, Ki67 ~5%, with negative B-cell gene rearrangement. A LN core biopsy  showed clotted blood with foci of B-lymphocytes, many CD20-positive, mix of B and T cells. We discussed that this is concerning for recurrent disease. We also discussed additional treatment options, including repeat rituximab (weekly), rituximab plus chemotherapy such as bendamustine, BTK inhibitor such as ibrutinib, or clinical trial including [facility]#282, a phase I study of CC-486 with lenalidomide and obinutuzumab for patients with previously treated indolent lymphoma including MALT lymphoma (requires 2 or more prior lines of therapy).     # MALT Lymphoma of Lung - concern for recurrence given recent FNA LN pathology  - PET concerning for recurrent disease in lung. Results from Bronch also support diagnosis of MZL  - PFTs show DLCO 81%  - Plan for weekly rituximab x 4 (locally if possible, if not patient can travel here). If no/minimal improvement, consider [facility]282 vs BTKi or other systemic therapy    # Endometrial Carcinoma  - Follow up with gyn onc, patient to establish care with Dr. García here at [facility]    # Palpitations - resolved since last visit   - has cardiology appmt 11/03  - Advised she present to the ED with worsening chest pain, palpitations, or dyspnea        Cardiology notes:  # ALLISON/SOB - likely pulmonary disease from her therapies, PFTs show only mildly reduced DLCO.   -echocardiogram personally reviewed today and was normal except mild RV enlargement.   -CT reviewed and showed no CAC, however this was a contrast, non-gated study. If continued symptoms and no clear pulmonary cause, would consider nuclear pharmacological stress testing as given inflammation from cancer, chemo, and her diabetes, she is certainly higher risk for obstructive CAD.   -could also be a component of deconditioning, if echo stable as well as stress testing, would need PT and assistance to regain exertional capabilities.     # Palpitations - likely PACs/PVCs/SVT  -metoprolol tartrate 25mg PO bid prn palpitations.   -CTM. Keep  electrolytes WNL.     # Dyslipidemia - lipitor given her poorly controlled diabetes (this was when she ran out of meds, but still certainly high dose statin is indicated).     # aspirin - she is on this per oncology for clot prevention.     This evaluation was conducted via Zoom using secure and encrypted videoconferencing technology. The patient was in their home in the [facility].    The patient's identity was confirmed and verbal consent was obtained for this virtual visit.      Return in about 6 months (around 11/23/2023). With NURY Ocampo MD, [facility], [address].  [address] [place] [id number]  Phone: [phone]  Fax: [phone]

## 2023-05-25 ENCOUNTER — OFFICE VISIT (OUTPATIENT)
Dept: MEDICAL GROUP | Facility: PHYSICIAN GROUP | Age: 58
End: 2023-05-25
Payer: COMMERCIAL

## 2023-05-25 VITALS
BODY MASS INDEX: 32.47 KG/M2 | TEMPERATURE: 97.3 F | HEART RATE: 90 BPM | WEIGHT: 202 LBS | SYSTOLIC BLOOD PRESSURE: 122 MMHG | HEIGHT: 66 IN | RESPIRATION RATE: 16 BRPM | DIASTOLIC BLOOD PRESSURE: 76 MMHG | OXYGEN SATURATION: 95 %

## 2023-05-25 DIAGNOSIS — Z00.00 ENCOUNTER FOR PREVENTATIVE ADULT HEALTH CARE EXAMINATION: ICD-10-CM

## 2023-05-25 PROCEDURE — 99396 PREV VISIT EST AGE 40-64: CPT | Performed by: NURSE PRACTITIONER

## 2023-05-25 PROCEDURE — 3074F SYST BP LT 130 MM HG: CPT | Performed by: NURSE PRACTITIONER

## 2023-05-25 PROCEDURE — 3078F DIAST BP <80 MM HG: CPT | Performed by: NURSE PRACTITIONER

## 2023-05-25 RX ORDER — LENALIDOMIDE 5 MG/1
CAPSULE ORAL
COMMUNITY
Start: 2023-02-17 | End: 2023-05-25

## 2023-05-25 ASSESSMENT — FIBROSIS 4 INDEX: FIB4 SCORE: 1.08

## 2023-05-25 ASSESSMENT — PATIENT HEALTH QUESTIONNAIRE - PHQ9: CLINICAL INTERPRETATION OF PHQ2 SCORE: 0

## 2023-05-25 NOTE — PROGRESS NOTES
Subjective:     CC:   Chief Complaint   Patient presents with    Annual Exam       HPI:   58 y.o. female here today for annual exam.   She has been feeling more tired lately. 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 States her chest port was taken out a few weeks ago due to increased clot risks with infusion; using just peripheral IV. Dr Lombardo/her pulmonologist retired; she is meeting Dr Power/[facility] pulm in 8/11/2023. She is considering going on FMLA next week while she is on chemotherapy to help with her fatigue. She will bring in work forms and I can help her with this.   She recently had tele consult with Dr Ocampo/cardiology and started on prn metoprolol for her tachycardia/palpitations.    She continues management of hypothyroid, diabetes mellitus type 2 with DECON; tolerating xigduo, Mounjaro, increased to 12mg recently; tresiba d/taty. States A1C- 5.5 about 2 weeks ago.     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  - s/p NADER, partial vaginectomy with PPLND, BSO and sentinel node injection procedure on 9/28/2019.   - S/p Carbo/Taxol for endometrial adenocarcinoma, with the addition of rituximab for MALT lymphoma. Completed 6 cycles from 12/11/19 to 3/27/20 and radiation completed on 12/2019.    Health Maintenance  Advanced directive: none on file  Osteoporosis Screen/ DEXA: PET CT per ONC  PT/vit D for falls prevention: no recent falls/fractures  Cholesterol Screening: TC//79, on daily atorvatstatin  Diabetes Screening: seeing DECON for diabetes mellitus type 2 mgt; on xigduo, Mounjaro.  Aspirin Use: daily ASA 81mg  Diet: heart healthy; eliminate highly processed carbs; avoid foods with added sugars. Intermittent fasting if tolerable  Exercise: not exercising much due to worsening fatigue  Substance Abuse: denies  Safe in  relationship.  Seat belts, bike helmet discussed.  Sun protection used.    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  - s/p NADER, partial vaginectomy with PPLND, BSO and sentinel node injection procedure on 9/28/2019.   -  Dr Elliott/ONC [facility];  -  Dr García/ONC GYN at [facility]      Colorectal Cancer Screening:  approved referral to GI; pt to schedule   Lung Cancer Screening: concerning for recurrence MALT lung lymphoma;   Cervical Cancer Screening: s/p NADER 2019  Breast Cancer Screening: normal mammogram 9/2022    Infectious disease screening/Immunizations  --STI Screening: declined  --Practices safe sex.  --HIV Screening: -ve 12/2022  --Hepatitis C Screening: -ve 11/2021  --Immunizations: up to date  Immunization History   Administered Date(s) Administered    Influenza (IM) Preservative Free - HISTORICAL DATA 10/15/2015    Influenza Vaccine Quad Inj (Pf) 10/13/2021, 11/01/2022    PFIZER BIVALENT SARS-COV-2 VACCINE (12+) 11/04/2022    PFIZER PURPLE CAP SARS-COV-2 VACCINATION (12+) 03/15/2021, 04/05/2021, 11/03/2021    Pneumococcal Conjugate Vaccine (PCV20) 11/01/2022    Pneumococcal polysaccharide vaccine (PPSV-23) 10/13/2021    Tdap Vaccine 08/15/2016         She  has a past medical history of Acquired hypothyroidism, Arthritis, Asthma, Back pain, Breath shortness, Cancer (HCC) (2019), Chickenpox, COPD (chronic obstructive pulmonary disease) (HCC), Crohns disease, Daytime sleepiness, Essential hypertension, Fatigue, GERD (gastroesophageal reflux disease), High cholesterol, Hypothyroidism, IBD (inflammatory bowel disease) (06/14/2021), Influenza, Insomnia, Lung cancer (HCC), MALT lymphoma (HCC), Obesity, Pulmonary emphysema (HCC), Pulmonary nodules, Restless leg syndrome, Shortness of breath, Type 2 diabetes mellitus without complication, with long-term current use of insulin (HCC), Wears glasses, and  Wheezing.  She  has a past surgical history that includes abdominal hysterectomy total; cholecystectomy; gastric bypass laparoscopic (2016); Sleeve,Jesus Vaso Thigh; bronchoscopy; lung biopsy open; carpal tunnel release; and pr palmar fasciectomy (Left, 3/3/2022).    Family History   Problem Relation Age of Onset    No Known Problems Other     Heart Disease Mother         rhuematic fever, valve replacement    Breast Cancer Mother     Heart Failure Father        Social History     Socioeconomic History    Marital status: Single     Spouse name: Not on file    Number of children: Not on file    Years of education: Not on file    Highest education level: Not on file   Occupational History    Not on file   Tobacco Use    Smoking status: Never    Smokeless tobacco: Never   Vaping Use    Vaping Use: Never used   Substance and Sexual Activity    Alcohol use: Yes     Alcohol/week: 0.0 oz     Comment: 1 to 2 drinks/year    Drug use: Never    Sexual activity: Not on file   Other Topics Concern    Not on file   Social History Narrative    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      Social Determinants of Health     Financial Resource Strain: Not on file   Food Insecurity: Not on file   Transportation Needs: Not on file   Physical Activity: Not on file   Stress: Not on file   Social Connections: Not on file   Intimate Partner Violence: Not on file   Housing Stability: Not on file       Patient Active Problem List    Diagnosis Date Noted    Coronary artery calcification seen on CT scan 04/10/2023    Dupuytren's contracture of left hand 01/19/2022    Ganglion cyst of finger of left hand 12/09/2021    Abnormal laboratory test 10/13/2021    Encounter for completion of form with patient 09/08/2021    Mediastinal lymphadenopathy 09/03/2021    Hilar lymphadenopathy 09/03/2021    Cancer with pulmonary metastases (HCC) (possible)  09/03/2021    Palpitations 09/03/2021    Other fatigue 09/03/2021    Orthopnea  09/03/2021    Dyspnea on exertion 09/03/2021    Chronic bilateral low back pain without sciatica 06/24/2021    IBD (inflammatory bowel disease) 06/14/2021    MALT lymphoma (HCC) 06/14/2021    Endometrioid adenocarcinoma of uterus (HCC) 06/14/2021    DM-2, Type 2 diabetes mellitus without complication, with long-term current use of insulin (HCC) 06/14/2021    Acquired hypothyroidism 06/14/2021    Gastroesophageal reflux disease without esophagitis 06/14/2021    Essential hypertension 06/14/2021    Dyslipidemia 06/14/2021    Pulmonary nodules 06/14/2021         Current Outpatient Medications   Medication Sig Dispense Refill    azaCITIDine 200 MG Tab Take  by mouth.      Lenalidomide 10 MG Cap Take  by mouth.      aspirin (ASA) 81 MG Chew Tab chewable tablet Chew 1 Tablet every day.      colesevelam (WELCHOL) 625 MG Tab Take 1 Tablet by mouth 2 times a day with meals.      docusate sodium 100 MG Cap Take 100 mg by mouth.      filgrastim-sndz (ZARXIO) 480 MCG/0.8ML Solution Prefilled Syringe injection Inject 480 mcg under the skin.      LORazepam (ATIVAN) 0.5 MG Tab Take 0.5 mg by mouth every 8 hours as needed.      OLANZapine (ZYPREXA) 5 MG Tab       ondansetron (ZOFRAN ODT) 4 MG TABLET DISPERSIBLE DISSOLVE 1 TO 2 TABLETS ON THE TONGUE EVERY 8 HOURS AS NEEDED FOR NAUSEA OR VOMITING      pantoprazole (PROTONIX) 40 MG Tablet Delayed Response       polyethylene glycol/lytes (MIRALAX) 17 g Pack       prochlorperazine (COMPAZINE) 10 MG Tab Take 10 mg by mouth 3 times a day as needed.      sennosides (SENOKOT) 8.6 MG Tab Take 8.6 mg by mouth.      Continuous Blood Gluc Sensor (FREESTYLE SAADIA 2 SENSOR) Misc APPLY 1 SENSOR TOPICALLY EVERY 14 DAYS AS DIRECTED      XIGDUO XR 5-1000 MG TABLET SR 24 HR Take 1 Tablet by mouth 2 times a day.      pioglitazone (ACTOS) 30 MG Tab Take 1 Tablet by mouth every day.      MOUNJARO 7.5 MG/0.5ML Solution Pen-injector Inject 7.5 mg as directed.      ramipril (ALTACE) 2.5 MG Cap Take 1  "Capsule by mouth every day. 90 Capsule 3    levothyroxine (SYNTHROID) 100 MCG Tab Take 1 Tablet by mouth every morning on an empty stomach. 90 Tablet 3    atorvastatin (LIPITOR) 40 MG Tab Take 1 Tablet by mouth at bedtime. 90 Tablet 3    ONETOUCH VERIO strip       therapeutic multivitamin-minerals (THERAGRAN-M) Tab Take 1 Tablet by mouth every day.      Blood Glucose Test Strips Use one One Touch Verio Flex strip to test blood sugar 4 times daily. 400 Strip 0    metoprolol tartrate (LOPRESSOR) 25 MG Tab Take 1 Tablet by mouth 2 times a day as needed (palpitations). 180 Tablet 3     No current facility-administered medications for this visit.     Allergies   Allergen Reactions    Paclitaxel Anaphylaxis     Tongue swelling, difficulty breathing, felt like she could have        Review of Systems   Constitutional: Negative for fever, chills and malaise; has fatigue.   HENT: Negative for congestion.    Eyes: Negative for pain.    Respiratory: Negative for cough and shortness of breath.  Cardiovascular: Negative for leg swelling.   Gastrointestinal: Negative for nausea, vomiting, abdominal pain and diarrhea.   Genitourinary: Negative for dysuria and hematuria.   Skin: Negative for rash.   Neurological: Negative for dizziness, focal weakness and headaches.   Endo/Heme/Allergies: Does not bleed easily.   Psychiatric/Behavioral: Negative for depression.  The patient is not nervous/anxious.      Objective:     /76   Pulse 90   Temp 36.3 °C (97.3 °F) (Temporal)   Resp 16   Ht 1.676 m (5' 6\")   Wt 91.6 kg (202 lb)   SpO2 95%   BMI 32.60 kg/m²   Body mass index is 32.6 kg/m².  Wt Readings from Last 4 Encounters:   23 91.6 kg (202 lb)   23 90.7 kg (200 lb)   04/10/23 96.2 kg (212 lb)   22 107 kg (235 lb)       Physical Exam:  Constitutional: Well-developed and well-nourished. Not diaphoretic. No distress.   Skin: Skin is warm and dry. No rash noted.  Head: Atraumatic without lesions.  Eyes: " Conjunctivae and extraocular motions are normal. Pupils are equal, round, and reactive to light. No scleral icterus.   Ears:  External ears unremarkable. Tympanic membranes clear and intact.  Nose: Nares patent. Septum midline. Turbinates without erythema nor edema. No discharge.   Mouth/Throat: Dentition is okay, no visible cavities. Tongue normal. Oropharynx is clear and moist. Posterior pharynx without erythema or exudates.  Neck: Supple, trachea midline. Normal range of motion. No thyromegaly present. No lymphadenopathy--cervical or supraclavicular.  Cardiovascular: Regular rate and rhythm, S1 and S2 without murmur, rubs, or gallops.  Lungs: Normal inspiratory effort, CTA bilaterally, no wheezes/rhonchi/rales  Breast: SBE per pt; no new Breast/nipple changes reported.  Abdomen: Soft, non tender, and without distention. Active bowel sounds in all four quadrants. No rebound, guarding, masses or HSM.  : deferred to GYN ONC  Extremities: No cyanosis, clubbing, erythema, nor edema. Distal pulses intact and symmetric.   Musculoskeletal: All major joints AROM full in all directions without pain.  Neurological: Alert and oriented x 3. DTRs 2+/3 and symmetric. No cranial nerve deficit. 5/5 myotomes. Sensation intact.   Psychiatric:  Behavior, mood, and affect are appropriate.    Assessment and Plan:     1. Encounter for preventative adult health care examination          Health Care Maintenance:     Labs per orders/ONC  Immunizations per orders; none due today  Patient counseled about skin care, diet, supplements, vitamins, safe sex and exercise.      Follow-up: Return in about 6 months (around 11/25/2023).

## 2023-05-26 DIAGNOSIS — R00.2 PALPITATIONS: ICD-10-CM

## 2023-05-26 NOTE — TELEPHONE ENCOUNTER
Is the patient due for a refill? Yes- pharmacy requesting 90-day supply per insurance.     Was the patient seen the past year? Yes    Date of last office visit: 4/23/2023    Does the patient have an upcoming appointment?  No   If yes, When?     Provider to refill:ABBY    Does the patients insurance require a 100 day supply?  No

## 2023-06-06 ENCOUNTER — HOSPITAL ENCOUNTER (OUTPATIENT)
Dept: LAB | Facility: MEDICAL CENTER | Age: 58
End: 2023-06-06
Attending: INTERNAL MEDICINE
Payer: COMMERCIAL

## 2023-06-06 LAB
BASOPHILS # BLD AUTO: 1.1 % (ref 0–1.8)
BASOPHILS # BLD: 0.04 K/UL (ref 0–0.12)
EOSINOPHIL # BLD AUTO: 0.34 K/UL (ref 0–0.51)
EOSINOPHIL NFR BLD: 9.2 % (ref 0–6.9)
ERYTHROCYTE [DISTWIDTH] IN BLOOD BY AUTOMATED COUNT: 48.6 FL (ref 35.9–50)
HCT VFR BLD AUTO: 46.4 % (ref 37–47)
HGB BLD-MCNC: 15.3 G/DL (ref 12–16)
IMM GRANULOCYTES # BLD AUTO: 0.01 K/UL (ref 0–0.11)
IMM GRANULOCYTES NFR BLD AUTO: 0.3 % (ref 0–0.9)
LYMPHOCYTES # BLD AUTO: 1.14 K/UL (ref 1–4.8)
LYMPHOCYTES NFR BLD: 30.8 % (ref 22–41)
MCH RBC QN AUTO: 30 PG (ref 27–33)
MCHC RBC AUTO-ENTMCNC: 33 G/DL (ref 32.2–35.5)
MCV RBC AUTO: 91 FL (ref 81.4–97.8)
MONOCYTES # BLD AUTO: 0.36 K/UL (ref 0–0.85)
MONOCYTES NFR BLD AUTO: 9.7 % (ref 0–13.4)
NEUTROPHILS # BLD AUTO: 1.81 K/UL (ref 1.82–7.42)
NEUTROPHILS NFR BLD: 48.9 % (ref 44–72)
NRBC # BLD AUTO: 0 K/UL
NRBC BLD-RTO: 0 /100 WBC (ref 0–0.2)
PLATELET # BLD AUTO: 269 K/UL (ref 164–446)
PMV BLD AUTO: 11.3 FL (ref 9–12.9)
RBC # BLD AUTO: 5.1 M/UL (ref 4.2–5.4)
WBC # BLD AUTO: 3.7 K/UL (ref 4.8–10.8)

## 2023-06-06 PROCEDURE — 36415 COLL VENOUS BLD VENIPUNCTURE: CPT

## 2023-06-06 PROCEDURE — 80053 COMPREHEN METABOLIC PANEL: CPT

## 2023-06-06 PROCEDURE — 84550 ASSAY OF BLOOD/URIC ACID: CPT

## 2023-06-06 PROCEDURE — 85025 COMPLETE CBC W/AUTO DIFF WBC: CPT

## 2023-06-06 PROCEDURE — 83615 LACTATE (LD) (LDH) ENZYME: CPT

## 2023-06-07 LAB
ALBUMIN SERPL BCP-MCNC: 4 G/DL (ref 3.2–4.9)
ALBUMIN/GLOB SERPL: 1.3 G/DL
ALP SERPL-CCNC: 72 U/L (ref 30–99)
ALT SERPL-CCNC: 20 U/L (ref 2–50)
ANION GAP SERPL CALC-SCNC: 12 MMOL/L (ref 7–16)
AST SERPL-CCNC: 19 U/L (ref 12–45)
BILIRUB SERPL-MCNC: 0.7 MG/DL (ref 0.1–1.5)
BUN SERPL-MCNC: 18 MG/DL (ref 8–22)
CALCIUM ALBUM COR SERPL-MCNC: 9.4 MG/DL (ref 8.5–10.5)
CALCIUM SERPL-MCNC: 9.4 MG/DL (ref 8.5–10.5)
CHLORIDE SERPL-SCNC: 107 MMOL/L (ref 96–112)
CO2 SERPL-SCNC: 25 MMOL/L (ref 20–33)
CREAT SERPL-MCNC: 0.75 MG/DL (ref 0.5–1.4)
GFR SERPLBLD CREATININE-BSD FMLA CKD-EPI: 92 ML/MIN/1.73 M 2
GLOBULIN SER CALC-MCNC: 3.2 G/DL (ref 1.9–3.5)
GLUCOSE SERPL-MCNC: 127 MG/DL (ref 65–99)
LDH SERPL L TO P-CCNC: 145 U/L (ref 107–266)
POTASSIUM SERPL-SCNC: 4.2 MMOL/L (ref 3.6–5.5)
PROT SERPL-MCNC: 7.2 G/DL (ref 6–8.2)
SODIUM SERPL-SCNC: 144 MMOL/L (ref 135–145)
URATE SERPL-MCNC: 3.1 MG/DL (ref 1.9–8.2)

## 2023-07-03 ENCOUNTER — HOSPITAL ENCOUNTER (OUTPATIENT)
Dept: LAB | Facility: MEDICAL CENTER | Age: 58
End: 2023-07-03
Attending: INTERNAL MEDICINE
Payer: COMMERCIAL

## 2023-07-03 LAB
ALBUMIN SERPL BCP-MCNC: 4.2 G/DL (ref 3.2–4.9)
ALBUMIN/GLOB SERPL: 1.3 G/DL
ALP SERPL-CCNC: 88 U/L (ref 30–99)
ALT SERPL-CCNC: 19 U/L (ref 2–50)
ANION GAP SERPL CALC-SCNC: 14 MMOL/L (ref 7–16)
AST SERPL-CCNC: 19 U/L (ref 12–45)
BASOPHILS # BLD AUTO: 1.1 % (ref 0–1.8)
BASOPHILS # BLD: 0.05 K/UL (ref 0–0.12)
BILIRUB SERPL-MCNC: 0.5 MG/DL (ref 0.1–1.5)
BUN SERPL-MCNC: 18 MG/DL (ref 8–22)
CALCIUM ALBUM COR SERPL-MCNC: 9.5 MG/DL (ref 8.5–10.5)
CALCIUM SERPL-MCNC: 9.7 MG/DL (ref 8.5–10.5)
CHLORIDE SERPL-SCNC: 105 MMOL/L (ref 96–112)
CO2 SERPL-SCNC: 23 MMOL/L (ref 20–33)
CREAT SERPL-MCNC: 0.8 MG/DL (ref 0.5–1.4)
EOSINOPHIL # BLD AUTO: 0.6 K/UL (ref 0–0.51)
EOSINOPHIL NFR BLD: 12.7 % (ref 0–6.9)
ERYTHROCYTE [DISTWIDTH] IN BLOOD BY AUTOMATED COUNT: 48 FL (ref 35.9–50)
GFR SERPLBLD CREATININE-BSD FMLA CKD-EPI: 85 ML/MIN/1.73 M 2
GLOBULIN SER CALC-MCNC: 3.3 G/DL (ref 1.9–3.5)
GLUCOSE SERPL-MCNC: 167 MG/DL (ref 65–99)
HCT VFR BLD AUTO: 44.4 % (ref 37–47)
HGB BLD-MCNC: 14.8 G/DL (ref 12–16)
IMM GRANULOCYTES # BLD AUTO: 0.05 K/UL (ref 0–0.11)
IMM GRANULOCYTES NFR BLD AUTO: 1.1 % (ref 0–0.9)
LDH SERPL L TO P-CCNC: 167 U/L (ref 107–266)
LYMPHOCYTES # BLD AUTO: 1.71 K/UL (ref 1–4.8)
LYMPHOCYTES NFR BLD: 36.1 % (ref 22–41)
MCH RBC QN AUTO: 30.1 PG (ref 27–33)
MCHC RBC AUTO-ENTMCNC: 33.3 G/DL (ref 32.2–35.5)
MCV RBC AUTO: 90.2 FL (ref 81.4–97.8)
MONOCYTES # BLD AUTO: 0.68 K/UL (ref 0–0.85)
MONOCYTES NFR BLD AUTO: 14.3 % (ref 0–13.4)
NEUTROPHILS # BLD AUTO: 1.65 K/UL (ref 1.82–7.42)
NEUTROPHILS NFR BLD: 34.7 % (ref 44–72)
NRBC # BLD AUTO: 0 K/UL
NRBC BLD-RTO: 0 /100 WBC (ref 0–0.2)
PLATELET # BLD AUTO: 232 K/UL (ref 164–446)
PMV BLD AUTO: 11.2 FL (ref 9–12.9)
POTASSIUM SERPL-SCNC: 3.8 MMOL/L (ref 3.6–5.5)
PROT SERPL-MCNC: 7.5 G/DL (ref 6–8.2)
RBC # BLD AUTO: 4.92 M/UL (ref 4.2–5.4)
SODIUM SERPL-SCNC: 142 MMOL/L (ref 135–145)
URATE SERPL-MCNC: 3.2 MG/DL (ref 1.9–8.2)
WBC # BLD AUTO: 4.7 K/UL (ref 4.8–10.8)

## 2023-07-03 PROCEDURE — 84550 ASSAY OF BLOOD/URIC ACID: CPT

## 2023-07-03 PROCEDURE — 85025 COMPLETE CBC W/AUTO DIFF WBC: CPT

## 2023-07-03 PROCEDURE — 36415 COLL VENOUS BLD VENIPUNCTURE: CPT

## 2023-07-03 PROCEDURE — 83615 LACTATE (LD) (LDH) ENZYME: CPT

## 2023-07-03 PROCEDURE — 80053 COMPREHEN METABOLIC PANEL: CPT

## 2023-07-31 ENCOUNTER — HOSPITAL ENCOUNTER (OUTPATIENT)
Dept: LAB | Facility: MEDICAL CENTER | Age: 58
End: 2023-07-31
Attending: INTERNAL MEDICINE
Payer: COMMERCIAL

## 2023-07-31 LAB
ALBUMIN SERPL BCP-MCNC: 3.8 G/DL (ref 3.2–4.9)
ALBUMIN/GLOB SERPL: 1.1 G/DL
ALP SERPL-CCNC: 73 U/L (ref 30–99)
ALT SERPL-CCNC: 17 U/L (ref 2–50)
ANION GAP SERPL CALC-SCNC: 8 MMOL/L (ref 7–16)
AST SERPL-CCNC: 22 U/L (ref 12–45)
BASOPHILS # BLD AUTO: 0.9 % (ref 0–1.8)
BASOPHILS # BLD: 0.04 K/UL (ref 0–0.12)
BILIRUB SERPL-MCNC: 0.5 MG/DL (ref 0.1–1.5)
BUN SERPL-MCNC: 13 MG/DL (ref 8–22)
CALCIUM ALBUM COR SERPL-MCNC: 9.2 MG/DL (ref 8.5–10.5)
CALCIUM SERPL-MCNC: 9 MG/DL (ref 8.5–10.5)
CHLORIDE SERPL-SCNC: 107 MMOL/L (ref 96–112)
CO2 SERPL-SCNC: 27 MMOL/L (ref 20–33)
CREAT SERPL-MCNC: 0.8 MG/DL (ref 0.5–1.4)
EOSINOPHIL # BLD AUTO: 0.67 K/UL (ref 0–0.51)
EOSINOPHIL NFR BLD: 15.3 % (ref 0–6.9)
ERYTHROCYTE [DISTWIDTH] IN BLOOD BY AUTOMATED COUNT: 50 FL (ref 35.9–50)
GFR SERPLBLD CREATININE-BSD FMLA CKD-EPI: 85 ML/MIN/1.73 M 2
GLOBULIN SER CALC-MCNC: 3.5 G/DL (ref 1.9–3.5)
GLUCOSE SERPL-MCNC: 156 MG/DL (ref 65–99)
HCT VFR BLD AUTO: 45.7 % (ref 37–47)
HGB BLD-MCNC: 15.1 G/DL (ref 12–16)
IMM GRANULOCYTES # BLD AUTO: 0.02 K/UL (ref 0–0.11)
IMM GRANULOCYTES NFR BLD AUTO: 0.5 % (ref 0–0.9)
LDH SERPL L TO P-CCNC: 132 U/L (ref 107–266)
LYMPHOCYTES # BLD AUTO: 1.22 K/UL (ref 1–4.8)
LYMPHOCYTES NFR BLD: 27.8 % (ref 22–41)
MCH RBC QN AUTO: 29.9 PG (ref 27–33)
MCHC RBC AUTO-ENTMCNC: 33 G/DL (ref 32.2–35.5)
MCV RBC AUTO: 90.5 FL (ref 81.4–97.8)
MONOCYTES # BLD AUTO: 0.62 K/UL (ref 0–0.85)
MONOCYTES NFR BLD AUTO: 14.1 % (ref 0–13.4)
NEUTROPHILS # BLD AUTO: 1.82 K/UL (ref 1.82–7.42)
NEUTROPHILS NFR BLD: 41.4 % (ref 44–72)
NRBC # BLD AUTO: 0 K/UL
NRBC BLD-RTO: 0 /100 WBC (ref 0–0.2)
PLATELET # BLD AUTO: 268 K/UL (ref 164–446)
PMV BLD AUTO: 10.9 FL (ref 9–12.9)
POTASSIUM SERPL-SCNC: 3.8 MMOL/L (ref 3.6–5.5)
PROT SERPL-MCNC: 7.3 G/DL (ref 6–8.2)
RBC # BLD AUTO: 5.05 M/UL (ref 4.2–5.4)
SODIUM SERPL-SCNC: 142 MMOL/L (ref 135–145)
WBC # BLD AUTO: 4.4 K/UL (ref 4.8–10.8)

## 2023-07-31 PROCEDURE — 80053 COMPREHEN METABOLIC PANEL: CPT

## 2023-07-31 PROCEDURE — 85025 COMPLETE CBC W/AUTO DIFF WBC: CPT

## 2023-07-31 PROCEDURE — 83615 LACTATE (LD) (LDH) ENZYME: CPT

## 2023-07-31 PROCEDURE — 36415 COLL VENOUS BLD VENIPUNCTURE: CPT

## 2023-08-11 ENCOUNTER — OFFICE VISIT (OUTPATIENT)
Dept: SLEEP MEDICINE | Facility: MEDICAL CENTER | Age: 58
End: 2023-08-11
Attending: INTERNAL MEDICINE
Payer: COMMERCIAL

## 2023-08-11 VITALS
BODY MASS INDEX: 29.89 KG/M2 | HEIGHT: 66 IN | WEIGHT: 186 LBS | DIASTOLIC BLOOD PRESSURE: 84 MMHG | SYSTOLIC BLOOD PRESSURE: 122 MMHG | OXYGEN SATURATION: 99 % | HEART RATE: 73 BPM

## 2023-08-11 DIAGNOSIS — R93.89 ABNORMAL CT OF THE CHEST: ICD-10-CM

## 2023-08-11 DIAGNOSIS — R91.8 PULMONARY NODULES: ICD-10-CM

## 2023-08-11 DIAGNOSIS — J84.9 ILD (INTERSTITIAL LUNG DISEASE) (HCC): ICD-10-CM

## 2023-08-11 PROCEDURE — 99214 OFFICE O/P EST MOD 30 MIN: CPT | Performed by: INTERNAL MEDICINE

## 2023-08-11 PROCEDURE — 3079F DIAST BP 80-89 MM HG: CPT | Performed by: INTERNAL MEDICINE

## 2023-08-11 PROCEDURE — 99213 OFFICE O/P EST LOW 20 MIN: CPT | Performed by: INTERNAL MEDICINE

## 2023-08-11 PROCEDURE — 3074F SYST BP LT 130 MM HG: CPT | Performed by: INTERNAL MEDICINE

## 2023-08-11 ASSESSMENT — FIBROSIS 4 INDEX: FIB4 SCORE: 1.17

## 2023-08-11 NOTE — PROGRESS NOTES
"Pulmonary Clinic follow up    Date of Service: 8/11/2023    Reason for follow up:  Follow-Up (Last seen 11/10/22 with Dr. 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 LIP  PFTs have been normal  Has MALT lymphoma and receiving Tx  Has hx of endometrial Ca  At this time following PFTs uearly  Ordered  Off O2 for now  Requested pulm rehab          Other Visit Diagnoses       ILD (interstitial lung disease) (HCC)        Relevant Orders    PULMONARY FUNCTION TESTS -Test requested: Complete Pulmonary Function Test    Referral to Pulmonary Rehab              History of Present Illness: Jaimee Serrano is a 58 y.o. female who has followed up with Dr. Lombardo and complex hx see below : Last seen by DR. Lombardo on 11/2022  \"58-year-old woman with a rather complex medical history.  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  During her evaluation at [facility] in [facility] she was found to have multiple pulmonary nodules.  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  At that time she was also noted to have evidence of large bilateral cysts on her chest CT.  There was no specific diagnosis of the pulmonary cystic disease.  She tells me at that time the physicians were uncertain about the etiology of her pulmonary cysts.  At that time she had a hysterectomy and NADER/BSO with lymph node dissection and partial vaginectomy.  She was treated with 6 cycles of carbo/Taxol and several cycles of rituximab for her BALT lymphoma.  Patient had been living in [place] but moved to [place] and has subsequently moved to this area.  She has " "been seen by oncology and GYN oncology at [facility].  She had recurrent pulmonary opacities and underwent bronchoscopy with lymph node fine-needle aspiration in September 2021 which was consistent with MALT lymphoma.  She had 4 subsequent treatments with rituximab.  Her complicated history is outlined by oncology notes and gynecologic oncology notes from [facility].  CT scan from 8/24/2021 was reviewed showing significant cystic lung disease involving primarily the right lung with an upper lobe predominance.  There are a few small cysts on the left side.  A PET scan from 2/9/2022 shows no significant FDG avidity in the lungs.  There is no change in the cystic lung disease and scattered nodules.  The patient tells me that she believes she had alpha 1 antitrypsin testing at [facility] that showed no evidence of that disease.  She has not had testing for BARROS.  The pathology reports that I have reviewed our FNA aspirates and flow cytometry with no description of the histology from her VATS biopsy.  She is a never smoker with no history of significant asthma or COPD.  She does have a history of Crohn's disease and a history of diabetes mellitus.  [place] has experienced some shortness of breath with activity.  She is not complaining of any significant cough or sputum production.  She says she does have some occasional wheezing.  She has no history of hemoptysis.  The patient is also complaining of some poor sleep quality with some daytime hypersomnolence.  Oxygen saturation at rest on room air was 96% and fell to 91% with ambulation.\" From Dr. Lombardo' note        Her home sleep study has demonstrated moderate obstructive sleep apnea with mild hypoxemia and an ELIZABETH of 23 events per hour.  She has been more symptomatic and is falling asleep during the day at times that are inappropriate.  She has not had any problems driving.  She also has some degree of insomnia and has been using melatonin without " "success.  We started her on auto CPAP but she was unable to tolerate it.     Pulmonary function testing demonstrates an FEV1 of 1.89 L (69% predicted).  FEV1 FVC ratio was 78%.  Total lung capacity was 89% of predicted.  DLCO was 111% of predicted.  Repeat testing was in the same range with an FEV1 of 1.89 L (71% predicted).  FEV1 FVC ratio was 77% with a total lung capacity of 105% of predicted and a DLCO of 103% of predicted.  Residual volume was increased on the second study so that component of air trapping was commented upon.  However this may have been due to less effort in the expiratory maneuver.     She was seen by thoracic surgery at [facility] and it was felt that repeat thoracoscopy for biopsy was not necessary at this time.  A repeat PET scan showed no uptake in her pulmonary nodules.  She had minimal uptake in her right lower lobe groundglass opacity that was felt to be most likely inflammatory.  There was felt to be no evidence of recurrent or residual lymphoma.   She had a repeat PET scan on 8/18/2022.  This time there was no change in her nodules or cysts.  However, there was slight uptake in the right midlung linear opacity consistent with possible recurrence of her MALT lymphoma.  She is being scheduled for a repeat course of chemotherapy for her lymphoma.  The finding of the increased residual volume and some air trapping should not interfere with her treatment.\" From DR. Lombardo's note    Dr. Lombardo was following PFTs, cystic dz of unknown etiology ( alpha 1 antitrypsin is negative)  D/d LIP.    Pt has had her PET done 5/2023 we dont have the scan but read shows   \"IMPRESSION:   1. Stable lesions and chronic changes bilaterally in the lungs. The   previously described lesion in the anterior right lung upper lobe is stable   in size, configuration and FDG avidity (which is below background blood   pool today).  Highest Deauville 5-point PET score today is 2. No evidence   of disease progression is " "identified.   2. Resolution of previously seen intense uptake in the colon.   3. Please see above for additional discussion of findings.  \"    Pt is still undergoing chemotherapy  She has good and bad days  Currently O2 sats are wnl    Review of Systems   Constitutional:  Positive for malaise/fatigue.   HENT: Negative.     Eyes: Negative.    Respiratory:  Positive for shortness of breath.    Cardiovascular: Negative.    Gastrointestinal: Negative.    Genitourinary: Negative.    Musculoskeletal: Negative.    Skin: Negative.    Neurological:  Positive for weakness.   Endo/Heme/Allergies: Negative.    Psychiatric/Behavioral: Negative.         Current Outpatient Medications on File Prior to Visit   Medication Sig Dispense Refill    metoprolol tartrate (LOPRESSOR) 25 MG Tab Take 1 Tablet by mouth 2 times a day as needed (palpitations). 180 Tablet 3    azaCITIDine 200 MG Tab Take  by mouth.      Lenalidomide 10 MG Cap Take  by mouth.      aspirin (ASA) 81 MG Chew Tab chewable tablet Chew 1 Tablet every day.      colesevelam (WELCHOL) 625 MG Tab Take 1 Tablet by mouth 2 times a day with meals.      docusate sodium 100 MG Cap Take 100 mg by mouth.      LORazepam (ATIVAN) 0.5 MG Tab Take 0.5 mg by mouth every 8 hours as needed.      OLANZapine (ZYPREXA) 5 MG Tab       ondansetron (ZOFRAN ODT) 4 MG TABLET DISPERSIBLE DISSOLVE 1 TO 2 TABLETS ON THE TONGUE EVERY 8 HOURS AS NEEDED FOR NAUSEA OR VOMITING      pantoprazole (PROTONIX) 40 MG Tablet Delayed Response       polyethylene glycol/lytes (MIRALAX) 17 g Pack       prochlorperazine (COMPAZINE) 10 MG Tab Take 10 mg by mouth 3 times a day as needed.      sennosides (SENOKOT) 8.6 MG Tab Take 8.6 mg by mouth.      Continuous Blood Gluc Sensor (FREESTYLE SAADIA 2 SENSOR) Misc APPLY 1 SENSOR TOPICALLY EVERY 14 DAYS AS DIRECTED      XIGDUO XR 5-1000 MG TABLET SR 24 HR Take 1 Tablet by mouth 2 times a day.      pioglitazone (ACTOS) 30 MG Tab Take 1 Tablet by mouth every day.      " MOUNJARO 7.5 MG/0.5ML Solution Pen-injector Inject 7.5 mg as directed.      ramipril (ALTACE) 2.5 MG Cap Take 1 Capsule by mouth every day. 90 Capsule 3    levothyroxine (SYNTHROID) 100 MCG Tab Take 1 Tablet by mouth every morning on an empty stomach. 90 Tablet 3    atorvastatin (LIPITOR) 40 MG Tab Take 1 Tablet by mouth at bedtime. 90 Tablet 3    ONETOUCH VERIO strip       therapeutic multivitamin-minerals (THERAGRAN-M) Tab Take 1 Tablet by mouth every day.      Blood Glucose Test Strips Use one One Touch Verio Flex strip to test blood sugar 4 times daily. 400 Strip 0     No current facility-administered medications on file prior to visit.       Social History     Tobacco Use    Smoking status: Never    Smokeless tobacco: Never   Vaping Use    Vaping Use: Never used   Substance Use Topics    Alcohol use: Yes     Alcohol/week: 0.0 oz     Comment: 1 to 2 drinks/year    Drug use: Never        Past Medical History:   Diagnosis Date    Acquired hypothyroidism     Arthritis     Right ankle    Asthma     Inhaler for exercise induced -rarely uses    Back pain     Breath shortness     Cancer (HCC) 2019    lymphoma-lungs-chemo immunotherapy    Chickenpox     COPD (chronic obstructive pulmonary disease) (HCC)     Crohns disease     Daytime sleepiness     Essential hypertension     Fatigue     GERD (gastroesophageal reflux disease)     High cholesterol     Hypothyroidism     IBD (inflammatory bowel disease) 06/14/2021    Chron's    Influenza     Insomnia     Lung cancer (HCC)     MALT lymphoma (HCC)     Followed by [facility]    Obesity     Pulmonary emphysema (HCC)     Pulmonary nodules     Restless leg syndrome     Shortness of breath     Type 2 diabetes mellitus without complication, with long-term current use of insulin (HCC)     Wears glasses     Wheezing        Past Surgical History:   Procedure Laterality Date    PB PALMAR FASCIECTOMY Left 3/3/2022    Procedure: LEFT INDEX FINGER FASCIECTOMY;  Surgeon: Brien Villagomez,  "M.D.;  Location: SURGERY SAME DAY [facility];  Service: Orthopedics    GASTRIC BYPASS LAPAROSCOPIC  2016    sleeve    ABDOMINAL HYSTERECTOMY TOTAL      NADER + SBO    BRONCHOSCOPY      CARPAL TUNNEL RELEASE      CHOLECYSTECTOMY      LUNG BIOPSY OPEN      SLEEVE,JESSICA VASO THIGH         Allergies: Paclitaxel    Family History   Problem Relation Age of Onset    No Known Problems Other     Heart Disease Mother         rhuematic fever, valve replacement    Breast Cancer Mother     Heart Failure Father        Vitals:    08/11/23 1320   Height: 1.676 m (5' 6\")   Weight: 84.4 kg (186 lb)   Weight % change since last entry.: 0 %   BP: 122/84   Pulse: 73   BMI (Calculated): 30.02       Physical Examination  Physical Exam  Constitutional:       Appearance: She is obese.   HENT:      Head: Normocephalic and atraumatic.   Eyes:      Extraocular Movements: Extraocular movements intact.      Pupils: Pupils are equal, round, and reactive to light.   Cardiovascular:      Rate and Rhythm: Normal rate.   Pulmonary:      Effort: Pulmonary effort is normal.      Breath sounds: Normal breath sounds.   Musculoskeletal:         General: Normal range of motion.      Cervical back: Normal range of motion.   Skin:     General: Skin is warm and dry.   Neurological:      General: No focal deficit present.      Mental Status: She is alert and oriented to person, place, and time.   Psychiatric:         Mood and Affect: Mood normal.         Behavior: Behavior normal.         Thought Content: Thought content normal.         Judgment: Judgment normal.              Derrell Lopez M.D., MD MPH [place]  [facility]  "

## 2023-08-13 PROBLEM — R93.89 ABNORMAL CT OF THE CHEST: Status: ACTIVE | Noted: 2023-08-13

## 2023-08-13 ASSESSMENT — ENCOUNTER SYMPTOMS
SHORTNESS OF BREATH: 1
MUSCULOSKELETAL NEGATIVE: 1
WEAKNESS: 1
EYES NEGATIVE: 1
PSYCHIATRIC NEGATIVE: 1
GASTROINTESTINAL NEGATIVE: 1
CARDIOVASCULAR NEGATIVE: 1

## 2023-08-13 NOTE — ASSESSMENT & PLAN NOTE
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 LIP  PFTs have been normal  Has MALT lymphoma and receiving Tx  Has hx of endometrial Ca  At this time following PFTs uearly  Ordered  Off O2 for now  Requested pulm rehab

## 2023-08-13 NOTE — ASSESSMENT & PLAN NOTE
Have the report of the last PET scan 5/2023  Nodules stable or resolved  BRUNO Mccabe continues to monitor

## 2023-08-17 ENCOUNTER — TELEPHONE (OUTPATIENT)
Dept: HEALTH INFORMATION MANAGEMENT | Facility: OTHER | Age: 58
End: 2023-08-17

## 2023-08-18 ENCOUNTER — NON-PROVIDER VISIT (OUTPATIENT)
Dept: SLEEP MEDICINE | Facility: MEDICAL CENTER | Age: 58
End: 2023-08-18
Attending: INTERNAL MEDICINE
Payer: COMMERCIAL

## 2023-08-18 VITALS — HEIGHT: 65 IN | BODY MASS INDEX: 30.82 KG/M2 | WEIGHT: 185 LBS

## 2023-08-18 DIAGNOSIS — J84.9 ILD (INTERSTITIAL LUNG DISEASE) (HCC): ICD-10-CM

## 2023-08-18 PROCEDURE — 94729 DIFFUSING CAPACITY: CPT | Mod: 26 | Performed by: INTERNAL MEDICINE

## 2023-08-18 PROCEDURE — 94060 EVALUATION OF WHEEZING: CPT | Mod: 26 | Performed by: INTERNAL MEDICINE

## 2023-08-18 PROCEDURE — 94060 EVALUATION OF WHEEZING: CPT | Performed by: INTERNAL MEDICINE

## 2023-08-18 PROCEDURE — 94726 PLETHYSMOGRAPHY LUNG VOLUMES: CPT | Performed by: INTERNAL MEDICINE

## 2023-08-18 PROCEDURE — 94729 DIFFUSING CAPACITY: CPT | Performed by: INTERNAL MEDICINE

## 2023-08-18 PROCEDURE — 94726 PLETHYSMOGRAPHY LUNG VOLUMES: CPT | Mod: 26 | Performed by: INTERNAL MEDICINE

## 2023-08-18 ASSESSMENT — PULMONARY FUNCTION TESTS
FVC: 2.93
FEV1/FVC: 74
FVC_PERCENT_PREDICTED: 90
FEV1/FVC_PERCENT_CHANGE: 2
FVC_PREDICTED: 3.3
FEV1/FVC_PERCENT_PREDICTED: 97
FEV1_PERCENT_PREDICTED: 87
FEV1/FVC_PREDICTED: 79
FEV1_PERCENT_PREDICTED: 83
FVC: 2.98
FVC_LLN: 2.75
FEV1_LLN: 2.17
FEV1/FVC_PERCENT_CHANGE: 300
FEV1_PERCENT_CHANGE: 1
FEV1/FVC_PERCENT_LLN: 66
FEV1/FVC_PERCENT_PREDICTED: 94
FEV1/FVC_PERCENT_PREDICTED: 93
FEV1_PERCENT_CHANGE: 3
FEV1/FVC: 76.17
FEV1/FVC: 76
FEV1_PREDICTED: 2.6
FEV1: 2.27
FEV1/FVC_PERCENT_PREDICTED: 79
FEV1/FVC_PERCENT_PREDICTED: 96
FEV1: 2.18
FVC_PERCENT_PREDICTED: 88
FEV1/FVC: 74

## 2023-08-18 ASSESSMENT — FIBROSIS 4 INDEX: FIB4 SCORE: 1.17

## 2023-08-18 NOTE — PROCEDURES
Tech: Marian Helton, RT  Good patient effort & cooperation.  Test was performed on the [employer] Body Plethysmograph- Elite DX system.  The predicted sets used for Spirometry are GLI-2012, for Lung Volumes are ITS, and for DLCO is GLI 2017.  The results of this test meet the ATS standards for acceptability and repeatability.  The DLCO was uncorrected for Hb.  A bronchodilator of Ventolin HFA 2 puffs via spacer was administered.  DLCO was performed during dilation period.    Interpretation:   There is no significant obstructive ventilatory defect on spirometry.  There is no significant response to bronchodilators.    Lung volumes demonstrate a reduced expiratory reserve volume, likely attributable to the reported BMI of 30.8.  Otherwise lung volumes are preserved.    Diffusion capacity is also preserved.    Flow volume loop is consistent with the above interpretation.    Compared to prior testing in 2022, the total lung capacity has improved slightly from 5.46 L to 5.62 L, or 108% predicted.    IChester M.D. am the author of this note (PFT interpretation).    __________  Chester Eastman MD  Pulmonary and Critical Care Medicine  [facility]

## 2023-08-30 ENCOUNTER — HOSPITAL ENCOUNTER (OUTPATIENT)
Dept: LAB | Facility: MEDICAL CENTER | Age: 58
End: 2023-08-30
Attending: INTERNAL MEDICINE
Payer: COMMERCIAL

## 2023-08-30 LAB
ALBUMIN SERPL BCP-MCNC: 4 G/DL (ref 3.2–4.9)
ALBUMIN/GLOB SERPL: 1.2 G/DL
ALP SERPL-CCNC: 85 U/L (ref 30–99)
ALT SERPL-CCNC: 19 U/L (ref 2–50)
ANION GAP SERPL CALC-SCNC: 12 MMOL/L (ref 7–16)
AST SERPL-CCNC: 20 U/L (ref 12–45)
BASOPHILS # BLD AUTO: 0.9 % (ref 0–1.8)
BASOPHILS # BLD: 0.04 K/UL (ref 0–0.12)
BILIRUB SERPL-MCNC: 0.6 MG/DL (ref 0.1–1.5)
BUN SERPL-MCNC: 18 MG/DL (ref 8–22)
CALCIUM ALBUM COR SERPL-MCNC: 9.7 MG/DL (ref 8.5–10.5)
CALCIUM SERPL-MCNC: 9.7 MG/DL (ref 8.5–10.5)
CHLORIDE SERPL-SCNC: 107 MMOL/L (ref 96–112)
CO2 SERPL-SCNC: 24 MMOL/L (ref 20–33)
CREAT SERPL-MCNC: 0.75 MG/DL (ref 0.5–1.4)
EOSINOPHIL # BLD AUTO: 0.39 K/UL (ref 0–0.51)
EOSINOPHIL NFR BLD: 9 % (ref 0–6.9)
ERYTHROCYTE [DISTWIDTH] IN BLOOD BY AUTOMATED COUNT: 50 FL (ref 35.9–50)
GFR SERPLBLD CREATININE-BSD FMLA CKD-EPI: 91 ML/MIN/1.73 M 2
GLOBULIN SER CALC-MCNC: 3.4 G/DL (ref 1.9–3.5)
GLUCOSE SERPL-MCNC: 149 MG/DL (ref 65–99)
HCT VFR BLD AUTO: 46.1 % (ref 37–47)
HGB BLD-MCNC: 15.3 G/DL (ref 12–16)
IMM GRANULOCYTES # BLD AUTO: 0.03 K/UL (ref 0–0.11)
IMM GRANULOCYTES NFR BLD AUTO: 0.7 % (ref 0–0.9)
LDH SERPL L TO P-CCNC: 140 U/L (ref 107–266)
LYMPHOCYTES # BLD AUTO: 1.22 K/UL (ref 1–4.8)
LYMPHOCYTES NFR BLD: 28.2 % (ref 22–41)
MCH RBC QN AUTO: 30.5 PG (ref 27–33)
MCHC RBC AUTO-ENTMCNC: 33.2 G/DL (ref 32.2–35.5)
MCV RBC AUTO: 92 FL (ref 81.4–97.8)
MONOCYTES # BLD AUTO: 0.49 K/UL (ref 0–0.85)
MONOCYTES NFR BLD AUTO: 11.3 % (ref 0–13.4)
NEUTROPHILS # BLD AUTO: 2.15 K/UL (ref 1.82–7.42)
NEUTROPHILS NFR BLD: 49.9 % (ref 44–72)
NRBC # BLD AUTO: 0 K/UL
NRBC BLD-RTO: 0 /100 WBC (ref 0–0.2)
PLATELET # BLD AUTO: 272 K/UL (ref 164–446)
PMV BLD AUTO: 10.6 FL (ref 9–12.9)
POTASSIUM SERPL-SCNC: 4.4 MMOL/L (ref 3.6–5.5)
PROT SERPL-MCNC: 7.4 G/DL (ref 6–8.2)
RBC # BLD AUTO: 5.01 M/UL (ref 4.2–5.4)
SODIUM SERPL-SCNC: 143 MMOL/L (ref 135–145)
WBC # BLD AUTO: 4.3 K/UL (ref 4.8–10.8)

## 2023-08-30 PROCEDURE — 80053 COMPREHEN METABOLIC PANEL: CPT

## 2023-08-30 PROCEDURE — 83615 LACTATE (LD) (LDH) ENZYME: CPT

## 2023-08-30 PROCEDURE — 36415 COLL VENOUS BLD VENIPUNCTURE: CPT

## 2023-08-30 PROCEDURE — 85025 COMPLETE CBC W/AUTO DIFF WBC: CPT

## 2023-09-26 ENCOUNTER — HOSPITAL ENCOUNTER (OUTPATIENT)
Dept: LAB | Facility: MEDICAL CENTER | Age: 58
End: 2023-09-26
Attending: INTERNAL MEDICINE
Payer: COMMERCIAL

## 2023-09-26 LAB
ALBUMIN SERPL BCP-MCNC: 4 G/DL (ref 3.2–4.9)
ALBUMIN/GLOB SERPL: 1.3 G/DL
ALP SERPL-CCNC: 81 U/L (ref 30–99)
ALT SERPL-CCNC: 18 U/L (ref 2–50)
ANION GAP SERPL CALC-SCNC: 9 MMOL/L (ref 7–16)
AST SERPL-CCNC: 22 U/L (ref 12–45)
BASOPHILS # BLD AUTO: 1.3 % (ref 0–1.8)
BASOPHILS # BLD: 0.07 K/UL (ref 0–0.12)
BILIRUB SERPL-MCNC: 0.5 MG/DL (ref 0.1–1.5)
BUN SERPL-MCNC: 15 MG/DL (ref 8–22)
CALCIUM ALBUM COR SERPL-MCNC: 9.7 MG/DL (ref 8.5–10.5)
CALCIUM SERPL-MCNC: 9.7 MG/DL (ref 8.5–10.5)
CHLORIDE SERPL-SCNC: 105 MMOL/L (ref 96–112)
CO2 SERPL-SCNC: 26 MMOL/L (ref 20–33)
CREAT SERPL-MCNC: 0.76 MG/DL (ref 0.5–1.4)
EOSINOPHIL # BLD AUTO: 0.35 K/UL (ref 0–0.51)
EOSINOPHIL NFR BLD: 6.7 % (ref 0–6.9)
ERYTHROCYTE [DISTWIDTH] IN BLOOD BY AUTOMATED COUNT: 46.9 FL (ref 35.9–50)
GFR SERPLBLD CREATININE-BSD FMLA CKD-EPI: 90 ML/MIN/1.73 M 2
GLOBULIN SER CALC-MCNC: 3.2 G/DL (ref 1.9–3.5)
GLUCOSE SERPL-MCNC: 120 MG/DL (ref 65–99)
HCT VFR BLD AUTO: 47 % (ref 37–47)
HGB BLD-MCNC: 15.8 G/DL (ref 12–16)
IMM GRANULOCYTES # BLD AUTO: 0.03 K/UL (ref 0–0.11)
IMM GRANULOCYTES NFR BLD AUTO: 0.6 % (ref 0–0.9)
LDH SERPL L TO P-CCNC: 143 U/L (ref 107–266)
LYMPHOCYTES # BLD AUTO: 1.48 K/UL (ref 1–4.8)
LYMPHOCYTES NFR BLD: 28.2 % (ref 22–41)
MCH RBC QN AUTO: 29.9 PG (ref 27–33)
MCHC RBC AUTO-ENTMCNC: 33.6 G/DL (ref 32.2–35.5)
MCV RBC AUTO: 88.8 FL (ref 81.4–97.8)
MONOCYTES # BLD AUTO: 0.94 K/UL (ref 0–0.85)
MONOCYTES NFR BLD AUTO: 17.9 % (ref 0–13.4)
NEUTROPHILS # BLD AUTO: 2.38 K/UL (ref 1.82–7.42)
NEUTROPHILS NFR BLD: 45.3 % (ref 44–72)
NRBC # BLD AUTO: 0 K/UL
NRBC BLD-RTO: 0 /100 WBC (ref 0–0.2)
PLATELET # BLD AUTO: 229 K/UL (ref 164–446)
PMV BLD AUTO: 10.6 FL (ref 9–12.9)
POTASSIUM SERPL-SCNC: 4 MMOL/L (ref 3.6–5.5)
PROT SERPL-MCNC: 7.2 G/DL (ref 6–8.2)
RBC # BLD AUTO: 5.29 M/UL (ref 4.2–5.4)
SODIUM SERPL-SCNC: 140 MMOL/L (ref 135–145)
WBC # BLD AUTO: 5.3 K/UL (ref 4.8–10.8)

## 2023-09-26 PROCEDURE — 85025 COMPLETE CBC W/AUTO DIFF WBC: CPT

## 2023-09-26 PROCEDURE — 36415 COLL VENOUS BLD VENIPUNCTURE: CPT

## 2023-09-26 PROCEDURE — 80053 COMPREHEN METABOLIC PANEL: CPT

## 2023-09-26 PROCEDURE — 83615 LACTATE (LD) (LDH) ENZYME: CPT

## 2023-10-23 ENCOUNTER — HOSPITAL ENCOUNTER (OUTPATIENT)
Dept: LAB | Facility: MEDICAL CENTER | Age: 58
End: 2023-10-23
Attending: INTERNAL MEDICINE
Payer: COMMERCIAL

## 2023-10-23 LAB
ALBUMIN SERPL BCP-MCNC: 3.8 G/DL (ref 3.2–4.9)
ALBUMIN/GLOB SERPL: 1.2 G/DL
ALP SERPL-CCNC: 79 U/L (ref 30–99)
ALT SERPL-CCNC: 19 U/L (ref 2–50)
ANION GAP SERPL CALC-SCNC: 9 MMOL/L (ref 7–16)
AST SERPL-CCNC: 23 U/L (ref 12–45)
BASOPHILS # BLD AUTO: 1.2 % (ref 0–1.8)
BASOPHILS # BLD: 0.06 K/UL (ref 0–0.12)
BILIRUB SERPL-MCNC: 0.4 MG/DL (ref 0.1–1.5)
BUN SERPL-MCNC: 14 MG/DL (ref 8–22)
CALCIUM ALBUM COR SERPL-MCNC: 9.2 MG/DL (ref 8.5–10.5)
CALCIUM SERPL-MCNC: 9 MG/DL (ref 8.5–10.5)
CHLORIDE SERPL-SCNC: 107 MMOL/L (ref 96–112)
CO2 SERPL-SCNC: 26 MMOL/L (ref 20–33)
CREAT SERPL-MCNC: 0.79 MG/DL (ref 0.5–1.4)
EOSINOPHIL # BLD AUTO: 0.6 K/UL (ref 0–0.51)
EOSINOPHIL NFR BLD: 12.1 % (ref 0–6.9)
ERYTHROCYTE [DISTWIDTH] IN BLOOD BY AUTOMATED COUNT: 47.7 FL (ref 35.9–50)
GFR SERPLBLD CREATININE-BSD FMLA CKD-EPI: 86 ML/MIN/1.73 M 2
GLOBULIN SER CALC-MCNC: 3.3 G/DL (ref 1.9–3.5)
GLUCOSE SERPL-MCNC: 135 MG/DL (ref 65–99)
HCT VFR BLD AUTO: 43.5 % (ref 37–47)
HGB BLD-MCNC: 14.8 G/DL (ref 12–16)
IMM GRANULOCYTES # BLD AUTO: 0.03 K/UL (ref 0–0.11)
IMM GRANULOCYTES NFR BLD AUTO: 0.6 % (ref 0–0.9)
LYMPHOCYTES # BLD AUTO: 1.67 K/UL (ref 1–4.8)
LYMPHOCYTES NFR BLD: 33.8 % (ref 22–41)
MCH RBC QN AUTO: 30.9 PG (ref 27–33)
MCHC RBC AUTO-ENTMCNC: 34 G/DL (ref 32.2–35.5)
MCV RBC AUTO: 90.8 FL (ref 81.4–97.8)
MONOCYTES # BLD AUTO: 0.67 K/UL (ref 0–0.85)
MONOCYTES NFR BLD AUTO: 13.6 % (ref 0–13.4)
NEUTROPHILS # BLD AUTO: 1.91 K/UL (ref 1.82–7.42)
NEUTROPHILS NFR BLD: 38.7 % (ref 44–72)
NRBC # BLD AUTO: 0 K/UL
NRBC BLD-RTO: 0 /100 WBC (ref 0–0.2)
PLATELET # BLD AUTO: 207 K/UL (ref 164–446)
PMV BLD AUTO: 11 FL (ref 9–12.9)
POTASSIUM SERPL-SCNC: 4.2 MMOL/L (ref 3.6–5.5)
PROT SERPL-MCNC: 7.1 G/DL (ref 6–8.2)
RBC # BLD AUTO: 4.79 M/UL (ref 4.2–5.4)
SODIUM SERPL-SCNC: 142 MMOL/L (ref 135–145)
WBC # BLD AUTO: 4.9 K/UL (ref 4.8–10.8)

## 2023-10-23 PROCEDURE — 80053 COMPREHEN METABOLIC PANEL: CPT

## 2023-10-23 PROCEDURE — 36415 COLL VENOUS BLD VENIPUNCTURE: CPT

## 2023-10-23 PROCEDURE — 85025 COMPLETE CBC W/AUTO DIFF WBC: CPT

## 2023-11-20 ENCOUNTER — HOSPITAL ENCOUNTER (OUTPATIENT)
Dept: LAB | Facility: MEDICAL CENTER | Age: 58
End: 2023-11-20
Attending: INTERNAL MEDICINE
Payer: COMMERCIAL

## 2023-11-20 LAB
ALBUMIN SERPL BCP-MCNC: 4.1 G/DL (ref 3.2–4.9)
ALBUMIN/GLOB SERPL: 1.3 G/DL
ALP SERPL-CCNC: 82 U/L (ref 30–99)
ALT SERPL-CCNC: 14 U/L (ref 2–50)
ANION GAP SERPL CALC-SCNC: 11 MMOL/L (ref 7–16)
AST SERPL-CCNC: 14 U/L (ref 12–45)
BASOPHILS # BLD AUTO: 1.1 % (ref 0–1.8)
BASOPHILS # BLD: 0.05 K/UL (ref 0–0.12)
BILIRUB SERPL-MCNC: 0.7 MG/DL (ref 0.1–1.5)
BUN SERPL-MCNC: 13 MG/DL (ref 8–22)
CALCIUM ALBUM COR SERPL-MCNC: 9.1 MG/DL (ref 8.5–10.5)
CALCIUM SERPL-MCNC: 9.2 MG/DL (ref 8.5–10.5)
CHLORIDE SERPL-SCNC: 106 MMOL/L (ref 96–112)
CO2 SERPL-SCNC: 24 MMOL/L (ref 20–33)
CREAT SERPL-MCNC: 0.7 MG/DL (ref 0.5–1.4)
EOSINOPHIL # BLD AUTO: 0.59 K/UL (ref 0–0.51)
EOSINOPHIL NFR BLD: 12.6 % (ref 0–6.9)
ERYTHROCYTE [DISTWIDTH] IN BLOOD BY AUTOMATED COUNT: 46.5 FL (ref 35.9–50)
GFR SERPLBLD CREATININE-BSD FMLA CKD-EPI: 99 ML/MIN/1.73 M 2
GLOBULIN SER CALC-MCNC: 3.1 G/DL (ref 1.9–3.5)
GLUCOSE SERPL-MCNC: 136 MG/DL (ref 65–99)
HCT VFR BLD AUTO: 44.4 % (ref 37–47)
HGB BLD-MCNC: 15.3 G/DL (ref 12–16)
IMM GRANULOCYTES # BLD AUTO: 0.04 K/UL (ref 0–0.11)
IMM GRANULOCYTES NFR BLD AUTO: 0.9 % (ref 0–0.9)
LDH SERPL L TO P-CCNC: 128 U/L (ref 107–266)
LYMPHOCYTES # BLD AUTO: 1.6 K/UL (ref 1–4.8)
LYMPHOCYTES NFR BLD: 34.3 % (ref 22–41)
MCH RBC QN AUTO: 31.4 PG (ref 27–33)
MCHC RBC AUTO-ENTMCNC: 34.5 G/DL (ref 32.2–35.5)
MCV RBC AUTO: 91.2 FL (ref 81.4–97.8)
MONOCYTES # BLD AUTO: 0.71 K/UL (ref 0–0.85)
MONOCYTES NFR BLD AUTO: 15.2 % (ref 0–13.4)
NEUTROPHILS # BLD AUTO: 1.68 K/UL (ref 1.82–7.42)
NEUTROPHILS NFR BLD: 35.9 % (ref 44–72)
NRBC # BLD AUTO: 0 K/UL
NRBC BLD-RTO: 0 /100 WBC (ref 0–0.2)
PLATELET # BLD AUTO: 171 K/UL (ref 164–446)
PMV BLD AUTO: 10.9 FL (ref 9–12.9)
POTASSIUM SERPL-SCNC: 3.9 MMOL/L (ref 3.6–5.5)
PROT SERPL-MCNC: 7.2 G/DL (ref 6–8.2)
RBC # BLD AUTO: 4.87 M/UL (ref 4.2–5.4)
SODIUM SERPL-SCNC: 141 MMOL/L (ref 135–145)
WBC # BLD AUTO: 4.7 K/UL (ref 4.8–10.8)

## 2023-11-20 PROCEDURE — 36415 COLL VENOUS BLD VENIPUNCTURE: CPT

## 2023-11-20 PROCEDURE — 83615 LACTATE (LD) (LDH) ENZYME: CPT

## 2023-11-20 PROCEDURE — 80053 COMPREHEN METABOLIC PANEL: CPT

## 2023-11-20 PROCEDURE — 85025 COMPLETE CBC W/AUTO DIFF WBC: CPT

## 2023-12-29 ENCOUNTER — OFFICE VISIT (OUTPATIENT)
Dept: SLEEP MEDICINE | Facility: MEDICAL CENTER | Age: 58
End: 2023-12-29
Attending: INTERNAL MEDICINE
Payer: COMMERCIAL

## 2023-12-29 VITALS
SYSTOLIC BLOOD PRESSURE: 128 MMHG | BODY MASS INDEX: 28.28 KG/M2 | HEIGHT: 66 IN | HEART RATE: 68 BPM | DIASTOLIC BLOOD PRESSURE: 72 MMHG | OXYGEN SATURATION: 97 % | WEIGHT: 176 LBS

## 2023-12-29 DIAGNOSIS — R93.89 ABNORMAL CT OF THE CHEST: ICD-10-CM

## 2023-12-29 PROCEDURE — 3078F DIAST BP <80 MM HG: CPT | Performed by: INTERNAL MEDICINE

## 2023-12-29 PROCEDURE — 99212 OFFICE O/P EST SF 10 MIN: CPT | Performed by: INTERNAL MEDICINE

## 2023-12-29 PROCEDURE — 3074F SYST BP LT 130 MM HG: CPT | Performed by: INTERNAL MEDICINE

## 2023-12-29 PROCEDURE — 99213 OFFICE O/P EST LOW 20 MIN: CPT | Performed by: INTERNAL MEDICINE

## 2023-12-29 ASSESSMENT — ENCOUNTER SYMPTOMS
CARDIOVASCULAR NEGATIVE: 1
RESPIRATORY NEGATIVE: 1
PSYCHIATRIC NEGATIVE: 1
EYES NEGATIVE: 1
NEUROLOGICAL NEGATIVE: 1
GASTROINTESTINAL NEGATIVE: 1
MUSCULOSKELETAL NEGATIVE: 1
CONSTITUTIONAL NEGATIVE: 1

## 2023-12-29 ASSESSMENT — FIBROSIS 4 INDEX: FIB4 SCORE: 1.29

## 2023-12-29 NOTE — ASSESSMENT & PLAN NOTE
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 LIP  PFTs have been normal and improved 8/2023 compared to 9/2022  Has MALT lymphoma and completed Tx  Has hx of endometrial Ca  At this time following PFTs yearly next is 8/2024  On RA sats 97%  Pt will follow up post PFTs

## 2023-12-29 NOTE — PROGRESS NOTES
"Pulmonary Clinic follow up    Date of Service: 12/29/2023    Reason for follow up:  Follow-Up (ILD. Last seen 8/11/23 w/ Dr. 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 LIP  PFTs have been normal and improved 8/2023 compared to 9/2022  Has MALT lymphoma and completed Tx  Has hx of endometrial Ca  At this time following PFTs yearly next is 8/2024  On RA sats 97%  Pt will follow up post PFTs           Relevant Orders    PULMONARY FUNCTION TESTS -Test requested: Complete Pulmonary Function Test         History of Present Illness: Jaimee Serrano is a    \"58-year-old woman with a rather complex medical history.  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  During her evaluation at [facility] in [facility] she was found to have multiple pulmonary nodules.  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  At that time she was also noted to have evidence of large bilateral cysts on her chest CT.  There was no specific diagnosis of the pulmonary cystic disease.  She tells me at that time the physicians were uncertain about the etiology of her pulmonary cysts.  At that time she had a hysterectomy and NADER/BSO with lymph node dissection and partial vaginectomy.  She was treated with 6 cycles of carbo/Taxol and several cycles of rituximab for her BALT lymphoma.  Patient had been living in [place] but moved to [place] and has subsequently moved to this area.  She has been seen by oncology and GYN oncology at [facility].  She had recurrent pulmonary opacities and underwent bronchoscopy with lymph node fine-needle aspiration in September 2021 which was consistent with MALT lymphoma.  She had 4 subsequent treatments with rituximab.  Her complicated history " "is outlined by oncology notes and gynecologic oncology notes from [facility].  CT scan from 8/24/2021 was reviewed showing significant cystic lung disease involving primarily the right lung with an upper lobe predominance.  There are a few small cysts on the left side.  A PET scan from 2/9/2022 shows no significant FDG avidity in the lungs.  There is no change in the cystic lung disease and scattered nodules.  The patient tells me that she believes she had alpha 1 antitrypsin testing at [facility] that showed no evidence of that disease.  She has not had testing for BARROS.  The pathology reports that I have reviewed our FNA aspirates and flow cytometry with no description of the histology from her VATS biopsy.  She is a never smoker with no history of significant asthma or COPD.  She does have a history of Crohn's disease and a history of diabetes mellitus.  [place] has experienced some shortness of breath with activity.  She is not complaining of any significant cough or sputum production.  She says she does have some occasional wheezing.  She has no history of hemoptysis.  The patient is also complaining of some poor sleep quality with some daytime hypersomnolence.  Oxygen saturation at rest on room air was 96% and fell to 91% with ambulation.\" From Dr. Lombardo' note        Her home sleep study has demonstrated moderate obstructive sleep apnea with mild hypoxemia and an ELIZABETH of 23 events per hour.  She has been more symptomatic and is falling asleep during the day at times that are inappropriate.  She has not had any problems driving.  She also has some degree of insomnia and has been using melatonin without success.  We started her on auto CPAP but she was unable to tolerate it.     Pulmonary function testing demonstrates an FEV1 of 1.89 L (69% predicted).  FEV1 FVC ratio was 78%.  Total lung capacity was 89% of predicted.  DLCO was 111% of predicted.  Repeat testing was in the same range " "with an FEV1 of 1.89 L (71% predicted).  FEV1 FVC ratio was 77% with a total lung capacity of 105% of predicted and a DLCO of 103% of predicted.  Residual volume was increased on the second study so that component of air trapping was commented upon.  However this may have been due to less effort in the expiratory maneuver.     She was seen by thoracic surgery at [facility] and it was felt that repeat thoracoscopy for biopsy was not necessary at this time.  A repeat PET scan showed no uptake in her pulmonary nodules.  She had minimal uptake in her right lower lobe groundglass opacity that was felt to be most likely inflammatory.  There was felt to be no evidence of recurrent or residual lymphoma.   She had a repeat PET scan on 8/18/2022.  This time there was no change in her nodules or cysts.  However, there was slight uptake in the right midlung linear opacity consistent with possible recurrence of her MALT lymphoma.  She is being scheduled for a repeat course of chemotherapy for her lymphoma.  The finding of the increased residual volume and some air trapping should not interfere with her treatment.\" From DR. Lombardo's note     Dr. Lombardo was following PFTs, cystic dz of unknown etiology ( alpha 1 antitrypsin is negative)  D/d LIP.     Pt has had her PET done 5/2023 we dont have the scan but read shows   \"IMPRESSION:   1. Stable lesions and chronic changes bilaterally in the lungs. The   previously described lesion in the anterior right lung upper lobe is stable   in size, configuration and FDG avidity (which is below background blood   pool today).  Highest Deauville 5-point PET score today is 2. No evidence   of disease progression is identified.   2. Resolution of previously seen intense uptake in the colon.   3. Please see above for additional discussion of findings.  \"    I last saw pt 8/2023 and she was still undergoing chemotherapy which she has now completed  Occasional dry cough  O2 sats 97% RA    8/2023 PFTS " below are normal and better than 2022 PFT      Review of Systems   Constitutional: Negative.    HENT: Negative.     Eyes: Negative.    Respiratory: Negative.     Cardiovascular: Negative.    Gastrointestinal: Negative.    Genitourinary: Negative.    Musculoskeletal: Negative.    Skin: Negative.    Neurological: Negative.    Endo/Heme/Allergies: Negative.    Psychiatric/Behavioral: Negative.         Current Outpatient Medications on File Prior to Visit   Medication Sig Dispense Refill    metoprolol tartrate (LOPRESSOR) 25 MG Tab Take 1 Tablet by mouth 2 times a day as needed (palpitations). 180 Tablet 3    azaCITIDine 200 MG Tab Take  by mouth.      Lenalidomide 10 MG Cap Take  by mouth.      colesevelam (WELCHOL) 625 MG Tab Take 1 Tablet by mouth 2 times a day with meals.      docusate sodium 100 MG Cap Take 100 mg by mouth.      LORazepam (ATIVAN) 0.5 MG Tab Take 0.5 mg by mouth every 8 hours as needed.      OLANZapine (ZYPREXA) 5 MG Tab       ondansetron (ZOFRAN ODT) 4 MG TABLET DISPERSIBLE DISSOLVE 1 TO 2 TABLETS ON THE TONGUE EVERY 8 HOURS AS NEEDED FOR NAUSEA OR VOMITING      pantoprazole (PROTONIX) 40 MG Tablet Delayed Response       polyethylene glycol/lytes (MIRALAX) 17 g Pack       prochlorperazine (COMPAZINE) 10 MG Tab Take 10 mg by mouth 3 times a day as needed.      sennosides (SENOKOT) 8.6 MG Tab Take 8.6 mg by mouth.      Continuous Blood Gluc Sensor (FREESTYLE SAADIA 2 SENSOR) Misc APPLY 1 SENSOR TOPICALLY EVERY 14 DAYS AS DIRECTED      XIGDUO XR 5-1000 MG TABLET SR 24 HR Take 1 Tablet by mouth 2 times a day.      pioglitazone (ACTOS) 30 MG Tab Take 1 Tablet by mouth every day.      MOUNJARO 7.5 MG/0.5ML Solution Pen-injector Inject 7.5 mg as directed.      ramipril (ALTACE) 2.5 MG Cap Take 1 Capsule by mouth every day. 90 Capsule 3    levothyroxine (SYNTHROID) 100 MCG Tab Take 1 Tablet by mouth every morning on an empty stomach. 90 Tablet 3    atorvastatin (LIPITOR) 40 MG Tab Take 1 Tablet by mouth at  bedtime. 90 Tablet 3    ONETOUCH VERIO strip       therapeutic multivitamin-minerals (THERAGRAN-M) Tab Take 1 Tablet by mouth every day.      Blood Glucose Test Strips Use one One Touch Verio Flex strip to test blood sugar 4 times daily. 400 Strip 0     No current facility-administered medications on file prior to visit.       Social History     Tobacco Use    Smoking status: Never    Smokeless tobacco: Never   Vaping Use    Vaping Use: Never used   Substance Use Topics    Alcohol use: Yes     Alcohol/week: 0.0 oz     Comment: 1 to 2 drinks/year    Drug use: Never        Past Medical History:   Diagnosis Date    Acquired hypothyroidism     Arthritis     Right ankle    Asthma     Inhaler for exercise induced -rarely uses    Back pain     Breath shortness     Cancer (HCC) 2019    lymphoma-lungs-chemo immunotherapy    Chickenpox     COPD (chronic obstructive pulmonary disease) (HCC)     Crohns disease     Daytime sleepiness     Essential hypertension     Fatigue     GERD (gastroesophageal reflux disease)     High cholesterol     Hypothyroidism     IBD (inflammatory bowel disease) 06/14/2021    Chron's    Influenza     Insomnia     Lung cancer (HCC)     MALT lymphoma (HCC)     Followed by [facility]    Obesity     Pulmonary emphysema (HCC)     Pulmonary nodules     Restless leg syndrome     Shortness of breath     Type 2 diabetes mellitus without complication, with long-term current use of insulin (HCC)     Wears glasses     Wheezing        Past Surgical History:   Procedure Laterality Date    PB PALMAR FASCIECTOMY Left 3/3/2022    Procedure: LEFT INDEX FINGER FASCIECTOMY;  Surgeon: Brien Villagomez M.D.;  Location: SURGERY SAME DAY [facility];  Service: Orthopedics    GASTRIC BYPASS LAPAROSCOPIC  2016    sleeve    ABDOMINAL HYSTERECTOMY TOTAL      NADER + SBO    BRONCHOSCOPY      CARPAL TUNNEL RELEASE      CHOLECYSTECTOMY      LUNG BIOPSY OPEN      SLEEVE,JESSICA VASO THIGH         Allergies: Paclitaxel    Family History  "  Problem Relation Age of Onset    No Known Problems Other     Heart Disease Mother         rhuematic fever, valve replacement    Breast Cancer Mother     Heart Failure Father        Vitals:    12/29/23 1326   Height: 1.676 m (5' 6\")   Weight: 79.8 kg (176 lb)   Weight % change since last entry.: 0 %   BP: 128/72   Pulse: 68   BMI (Calculated): 28.41       Physical Examination  Physical Exam  HENT:      Head: Normocephalic and atraumatic.   Eyes:      Extraocular Movements: Extraocular movements intact.      Pupils: Pupils are equal, round, and reactive to light.   Cardiovascular:      Rate and Rhythm: Normal rate.   Pulmonary:      Effort: Pulmonary effort is normal.      Breath sounds: Normal breath sounds.   Musculoskeletal:         General: Normal range of motion.      Cervical back: Normal range of motion.   Skin:     General: Skin is warm and dry.   Neurological:      General: No focal deficit present.      Mental Status: She is alert and oriented to person, place, and time.   Psychiatric:         Mood and Affect: Mood normal.         Behavior: Behavior normal.         Thought Content: Thought content normal.         Judgment: Judgment normal.                 Derrell Lopez M.D., MD MPH [facility]  [facility]  "

## 2024-01-12 ENCOUNTER — HOSPITAL ENCOUNTER (OUTPATIENT)
Dept: LAB | Facility: MEDICAL CENTER | Age: 59
End: 2024-01-12
Attending: INTERNAL MEDICINE

## 2024-01-12 LAB
BASOPHILS # BLD AUTO: 0.9 % (ref 0–1.8)
BASOPHILS # BLD: 0.05 K/UL (ref 0–0.12)
EOSINOPHIL # BLD AUTO: 0.24 K/UL (ref 0–0.51)
EOSINOPHIL NFR BLD: 4.1 % (ref 0–6.9)
ERYTHROCYTE [DISTWIDTH] IN BLOOD BY AUTOMATED COUNT: 44.1 FL (ref 35.9–50)
HCT VFR BLD AUTO: 45.4 % (ref 37–47)
HGB BLD-MCNC: 15.3 G/DL (ref 12–16)
IMM GRANULOCYTES # BLD AUTO: 0.02 K/UL (ref 0–0.11)
IMM GRANULOCYTES NFR BLD AUTO: 0.3 % (ref 0–0.9)
LYMPHOCYTES # BLD AUTO: 1.61 K/UL (ref 1–4.8)
LYMPHOCYTES NFR BLD: 27.6 % (ref 22–41)
MCH RBC QN AUTO: 31.4 PG (ref 27–33)
MCHC RBC AUTO-ENTMCNC: 33.7 G/DL (ref 32.2–35.5)
MCV RBC AUTO: 93 FL (ref 81.4–97.8)
MONOCYTES # BLD AUTO: 0.68 K/UL (ref 0–0.85)
MONOCYTES NFR BLD AUTO: 11.7 % (ref 0–13.4)
NEUTROPHILS # BLD AUTO: 3.23 K/UL (ref 1.82–7.42)
NEUTROPHILS NFR BLD: 55.4 % (ref 44–72)
NRBC # BLD AUTO: 0 K/UL
NRBC BLD-RTO: 0 /100 WBC (ref 0–0.2)
PLATELET # BLD AUTO: 168 K/UL (ref 164–446)
PMV BLD AUTO: 11.3 FL (ref 9–12.9)
RBC # BLD AUTO: 4.88 M/UL (ref 4.2–5.4)
WBC # BLD AUTO: 5.8 K/UL (ref 4.8–10.8)

## 2024-01-12 PROCEDURE — 85025 COMPLETE CBC W/AUTO DIFF WBC: CPT

## 2024-01-12 PROCEDURE — 36415 COLL VENOUS BLD VENIPUNCTURE: CPT

## 2024-05-16 ENCOUNTER — TELEPHONE (OUTPATIENT)
Dept: HEALTH INFORMATION MANAGEMENT | Facility: OTHER | Age: 59
End: 2024-05-16

## 2024-07-09 ENCOUNTER — TELEPHONE (OUTPATIENT)
Dept: HEALTH INFORMATION MANAGEMENT | Facility: OTHER | Age: 59
End: 2024-07-09

## 2024-08-15 ENCOUNTER — HOSPITAL ENCOUNTER (OUTPATIENT)
Facility: MEDICAL CENTER | Age: 59
End: 2024-08-15
Attending: FAMILY MEDICINE
Payer: COMMERCIAL

## 2024-08-15 ENCOUNTER — OFFICE VISIT (OUTPATIENT)
Dept: MEDICAL GROUP | Facility: PHYSICIAN GROUP | Age: 59
End: 2024-08-15
Payer: COMMERCIAL

## 2024-08-15 VITALS
BODY MASS INDEX: 32.38 KG/M2 | TEMPERATURE: 97.5 F | HEIGHT: 66 IN | OXYGEN SATURATION: 99 % | DIASTOLIC BLOOD PRESSURE: 72 MMHG | WEIGHT: 201.5 LBS | SYSTOLIC BLOOD PRESSURE: 116 MMHG | HEART RATE: 70 BPM

## 2024-08-15 DIAGNOSIS — I10 ESSENTIAL HYPERTENSION: ICD-10-CM

## 2024-08-15 DIAGNOSIS — E66.9 OBESITY (BMI 30-39.9): ICD-10-CM

## 2024-08-15 DIAGNOSIS — Z00.00 ENCOUNTER FOR PREVENTATIVE ADULT HEALTH CARE EXAMINATION: ICD-10-CM

## 2024-08-15 DIAGNOSIS — K50.819 CROHN'S DISEASE OF SMALL AND LARGE INTESTINES WITH COMPLICATION (HCC): ICD-10-CM

## 2024-08-15 DIAGNOSIS — E78.5 DYSLIPIDEMIA: ICD-10-CM

## 2024-08-15 DIAGNOSIS — Z79.4 TYPE 2 DIABETES MELLITUS WITHOUT COMPLICATION, WITH LONG-TERM CURRENT USE OF INSULIN (HCC): ICD-10-CM

## 2024-08-15 DIAGNOSIS — E11.9 TYPE 2 DIABETES MELLITUS WITHOUT COMPLICATION, WITH LONG-TERM CURRENT USE OF INSULIN (HCC): ICD-10-CM

## 2024-08-15 DIAGNOSIS — Z12.31 ENCOUNTER FOR SCREENING MAMMOGRAM FOR MALIGNANT NEOPLASM OF BREAST: ICD-10-CM

## 2024-08-15 DIAGNOSIS — M65.312 TRIGGER FINGER OF LEFT THUMB: ICD-10-CM

## 2024-08-15 DIAGNOSIS — Z11.3 SCREEN FOR STD (SEXUALLY TRANSMITTED DISEASE): ICD-10-CM

## 2024-08-15 DIAGNOSIS — K21.9 GASTROESOPHAGEAL REFLUX DISEASE WITHOUT ESOPHAGITIS: ICD-10-CM

## 2024-08-15 DIAGNOSIS — E03.9 ACQUIRED HYPOTHYROIDISM: ICD-10-CM

## 2024-08-15 PROBLEM — R79.89 ABNORMAL LIVER FUNCTION TESTS: Status: ACTIVE | Noted: 2019-03-18

## 2024-08-15 PROBLEM — C54.1 ENDOMETRIAL CARCINOMA (HCC): Status: ACTIVE | Noted: 2020-01-20

## 2024-08-15 PROBLEM — C85.90 MALIGNANT LYMPHOMA (HCC): Status: ACTIVE | Noted: 2020-01-20

## 2024-08-15 LAB
CREAT UR-MCNC: 110.35 MG/DL
MICROALBUMIN UR-MCNC: <1.2 MG/DL
MICROALBUMIN/CREAT UR: NORMAL MG/G (ref 0–30)

## 2024-08-15 PROCEDURE — 3074F SYST BP LT 130 MM HG: CPT | Performed by: FAMILY MEDICINE

## 2024-08-15 PROCEDURE — 20550 NJX 1 TENDON SHEATH/LIGAMENT: CPT | Performed by: FAMILY MEDICINE

## 2024-08-15 PROCEDURE — 82043 UR ALBUMIN QUANTITATIVE: CPT

## 2024-08-15 PROCEDURE — 99214 OFFICE O/P EST MOD 30 MIN: CPT | Mod: 25 | Performed by: FAMILY MEDICINE

## 2024-08-15 PROCEDURE — 82570 ASSAY OF URINE CREATININE: CPT

## 2024-08-15 PROCEDURE — 92250 FUNDUS PHOTOGRAPHY W/I&R: CPT | Mod: TC | Performed by: FAMILY MEDICINE

## 2024-08-15 PROCEDURE — 3078F DIAST BP <80 MM HG: CPT | Performed by: FAMILY MEDICINE

## 2024-08-15 RX ORDER — OMEPRAZOLE 40 MG/1
40 CAPSULE, DELAYED RELEASE ORAL DAILY
COMMUNITY

## 2024-08-15 RX ORDER — LEVOTHYROXINE SODIUM 100 UG/1
100 TABLET ORAL
Qty: 90 TABLET | Refills: 3 | Status: SHIPPED | OUTPATIENT
Start: 2024-08-15

## 2024-08-15 RX ORDER — ASPIRIN 81 MG/1
81 TABLET ORAL DAILY
Qty: 90 TABLET | Refills: 3 | Status: SHIPPED | OUTPATIENT
Start: 2024-08-15

## 2024-08-15 RX ORDER — RAMIPRIL 2.5 MG/1
2.5 CAPSULE ORAL DAILY
Qty: 90 CAPSULE | Refills: 3 | Status: SHIPPED | OUTPATIENT
Start: 2024-08-15

## 2024-08-15 RX ORDER — ATORVASTATIN CALCIUM 40 MG/1
40 TABLET, FILM COATED ORAL
Qty: 90 TABLET | Refills: 3 | Status: SHIPPED | OUTPATIENT
Start: 2024-08-15

## 2024-08-15 RX ORDER — TRIAMCINOLONE ACETONIDE 40 MG/ML
40 INJECTION, SUSPENSION INTRA-ARTICULAR; INTRAMUSCULAR ONCE
Status: COMPLETED | OUTPATIENT
Start: 2024-08-15 | End: 2024-08-15

## 2024-08-15 RX ADMIN — TRIAMCINOLONE ACETONIDE 40 MG: 40 INJECTION, SUSPENSION INTRA-ARTICULAR; INTRAMUSCULAR at 13:56

## 2024-08-15 RX ADMIN — Medication 1 ML: at 13:55

## 2024-08-15 ASSESSMENT — ENCOUNTER SYMPTOMS
VOMITING: 0
SHORTNESS OF BREATH: 0
FEVER: 0
NAUSEA: 0
CHILLS: 0
COUGH: 0
ABDOMINAL PAIN: 0
PALPITATIONS: 0
SORE THROAT: 0

## 2024-08-15 ASSESSMENT — FIBROSIS 4 INDEX: FIB4 SCORE: 1.336306209562121923

## 2024-08-15 ASSESSMENT — PATIENT HEALTH QUESTIONNAIRE - PHQ9: CLINICAL INTERPRETATION OF PHQ2 SCORE: 0

## 2024-08-15 NOTE — PROGRESS NOTES
Verbal consent was acquired by the patient to use MXP4 ambient listening note generation during this visit.    Subjective:     HPI:   History of Present Illness  The patient is a 59-year-old female who presents today for evaluation and establishment of care. She has a history of hypertension, hyperlipidemia, type 2 diabetes, hypothyroidism, irritable bowel disease, multiple lymphoma, and endometrioid adenocarcinoma of the uterus with pulmonary metastases. She is due for diabetic screening, annual labs, and a mammogram. Her last A1c was done on 08/11/2022 and was 10.8. She used to be under the care of Dr. Makenna Holland, SE.    She reports difficulty in performing her work as an  due to pain in her hand, primarily in the thumb.  Patient notes she is getting some catching and clicking noted in the thumb and fourth digit.  Patient also admits to nodule formation and the sensation of her finger getting stuck.  Patient reports she has had these complications on the opposite side and did require trigger finger release in her past.  Patient has not had any previous injections or surgical procedures about the left hand.  No previous x-rays MRI imaging or other diagnostics.  Patient has not completed any home exercise program or occupational therapy.  Patient has been taking ibuprofen and Tylenol as needed as well as some topical creams to help with the pain and significant mechanical symptoms.  She has not had any issues with previous steroid administration and does not have any allergies to local anesthetics like lidocaine.    She has been managing her diabetes with Mounjaro, which she reports has been effective. Her last A1c test was conducted 2 months ago at [facility], and she recalls the result being around 5. She is under the care of an endocrinologist for her diabetes at [facility]. Her primary care physician had previously performed a foot exam, but she has not had one recently. She has not  undergone a urine screen for diabetes within the last year.  She has not had recent retinopathy screening.  Patient reports she has no urinary symptoms neuropathy or visual changes.  Patient is currently on ramipril for kidney protectant as well as Mounjaro 7.5 mg q. 7 days for diabetes and has been well-controlled.    Patient does currently take metoprolol 25 mg twice daily as needed for tachycardia and abnormal beats.  Patient reports the symptoms were only present during her chemotherapy and she has not required the medication in multiple months.. She experienced irregular heartbeats during her chemotherapy treatment, but this issue has not recurred since the completion of her treatment. She believes she may have some mild cardiovascular issues related to her lung condition. She has undergone a echocardiogram and is under the care of a cardiologist, Dr. Jos Ocampo, with her next appointment scheduled for November 2024.    She has been taking omeprazole 40 mg in the morning for her acid reflux for the past 6 weeks, having switched from Protonix. She underwent an endoscopy/colonoscopy last month and is under the care of a gastroenterologist.  Patient also has a history of Crohn's disease/inflammatory bowel disease but is currently not on any medications as there is a concern these may have contributed to her lymphoma.She is under the care of [facility] for her GI issues.    She is on Lipitor for her cholesterol management and levothyroxine 100 mcg daily for her hypothyroidism.    She completed her lenalidomide treatment in January 2024 and is not currently on chemotherapy. She is under the care of Dr. Celestin at [facility] for her oncology follow-up. She underwent a hysterectomy, and bilateral salpingo-oophorectomy.  She did require both chemotherapy and radiation as well as surgical removal for treatment of the adenocarcinoma.  Her lymphoma is considered stage 4 and has metastasized to her  "lungs, but it is slow to spread.     She reports no breast lumps or bumps.  She does get regular mammograms with her last mammogram being approximately 1 year ago.     She has been advised to undergo colon cancer screening every 2 years.     She currently denies any chest pain shortness of breath fever chills or other constitutional symptoms.     She works remotely for a [place] company and has been able to continue working throughout her treatment, except for when she was undergoing chemotherapy. She is considering applying for disability due to her stage 4 cancer diagnosis.    Review of Systems   Constitutional:  Negative for chills and fever.   HENT:  Negative for congestion and sore throat.    Respiratory:  Negative for cough and shortness of breath.    Cardiovascular:  Negative for chest pain and palpitations.   Gastrointestinal:  Negative for abdominal pain, nausea and vomiting.       Health Maintenance: Completed    Objective:     Exam:  /72 (BP Location: Right arm, Patient Position: Sitting, BP Cuff Size: Adult)   Pulse 70   Temp 36.4 °C (97.5 °F) (Temporal)   Ht 1.676 m (5' 6\")   Wt 91.4 kg (201 lb 8 oz)   SpO2 99%   BMI 32.52 kg/m²  Body mass index is 32.52 kg/m².    Physical Exam  Vitals reviewed.   Constitutional:       Appearance: Normal appearance.   HENT:      Head: Normocephalic and atraumatic.      Right Ear: External ear normal.      Left Ear: External ear normal.      Nose: Nose normal.   Cardiovascular:      Rate and Rhythm: Normal rate and regular rhythm.      Pulses: Normal pulses.      Heart sounds: Normal heart sounds. No murmur heard.  Pulmonary:      Effort: Pulmonary effort is normal. No respiratory distress.      Breath sounds: Normal breath sounds. No wheezing.   Abdominal:      General: Abdomen is flat.      Palpations: Abdomen is soft.   Skin:     General: Skin is warm and dry.   Neurological:      Mental Status: She is alert and oriented to person, place, and time. "   Psychiatric:         Mood and Affect: Mood normal.         Behavior: Behavior normal.     Monofilament testing with a 10 gram force: sensation intact: intact bilaterally  Visual Inspection: Feet without maceration, ulcers, fissures.  Pedal pulses: intact bilaterally        General Procedure    Date/Time: 8/15/2024 9:02 AM    Performed by: Dai Roberto M.D.  Authorized by: Dai Roberto M.D.  Preparation: Patient was prepped and draped in the usual sterile fashion.  Local anesthesia used: yes  Anesthesia: see MAR for details    Anesthesia:  Local anesthesia used: yes  Local Anesthetic: lidocaine 1% without epinephrine  Anesthetic total: 1 mL    Sedation:  Patient sedated: no    Comments: Kenalog 40 mg/1 cc- Lidocaine W/o EPI 1 cc into 1st a1 pulley   Indication: trigger finger   Tolerated well without complications           Results  Laboratory Studies  Last A1c was 10.8 on 8/11/2022.    Assessment & Plan:     1. Type 2 diabetes mellitus without complication, with long-term current use of insulin ([facility])  CBC WITHOUT DIFFERENTIAL    TSH WITH REFLEX TO FT4    Lipid Profile    Comp Metabolic Panel    POCT Retinal Eye Exam    Diabetic Monofilament LE Exam    MICROALBUMIN CREAT RATIO URINE    CANCELED: MICROALBUMIN CREAT RATIO URINE      2. Dyslipidemia  Lipid Profile    Comp Metabolic Panel    atorvastatin (LIPITOR) 40 MG Tab      3. Acquired hypothyroidism  TSH WITH REFLEX TO FT4    levothyroxine (SYNTHROID) 100 MCG Tab      4. Gastroesophageal reflux disease without esophagitis  omeprazole (PRILOSEC) 40 MG delayed-release capsule      5. Encounter for screening mammogram for malignant neoplasm of breast  MA-SCREENING MAMMO BILAT W/TOMOSYNTHESIS W/CAD      6. Essential hypertension  ramipril (ALTACE) 2.5 MG Cap          Assessment & Plan  1. 1st digit A1 pulley, trigger Finger.  Corticosteroid injection performed in office today  The potential risks and benefits of this treatment have been discussed. Should the  condition worsen, surgical release may be considered.    2. Type 2 Diabetes Mellitus.  She follows with [facility]/endocrinology  Last A1c was completed approximately 2 months ago and is in the fives per patient, unfortunately labs are not available for review in office today  Diabetic retinopathy screening performed in office today  Urine microalbumin/creatinine ratio performed in office today  Monofilament diabetic foot exam performed in office today  Continue 7.5 mg Mounjaro q. 7 days  Recommended close follow-up with her endocrinologist for A1c monitoring.    3. Gastroesophageal Reflux Disease (GERD).  Continue omeprazole 40 mg nightly    4. Hypothyroidism.  Continue levothyroxine 100 mcg daily    5. Hyperlipidemia.  Continue atorvastatin 40 mg daily    6. Hypertension.  Continue ramipril 2.5 mg daily    Health Maintenance.  Annual labs including CMP CBC TSH lipid pending   Mammogram pending        Return in about 6 months (around 2/15/2025) for f/up.    Please note that this dictation was created using voice recognition software. I have made every reasonable attempt to correct obvious errors, but I expect that there are errors of grammar and possibly content that I did not discover before finalizing the note.    Dai Roberto MD  Family Medicine and Non - Operative Sports Medicine   [facility]

## 2024-08-23 ENCOUNTER — HOSPITAL ENCOUNTER (OUTPATIENT)
Dept: RADIOLOGY | Facility: MEDICAL CENTER | Age: 59
End: 2024-08-23
Attending: FAMILY MEDICINE
Payer: COMMERCIAL

## 2024-08-23 DIAGNOSIS — Z12.31 ENCOUNTER FOR SCREENING MAMMOGRAM FOR MALIGNANT NEOPLASM OF BREAST: ICD-10-CM

## 2024-08-23 PROCEDURE — 77067 SCR MAMMO BI INCL CAD: CPT

## 2024-08-29 LAB — RETINAL SCREEN: NEGATIVE

## 2024-09-05 ENCOUNTER — HOSPITAL ENCOUNTER (OUTPATIENT)
Dept: LAB | Facility: MEDICAL CENTER | Age: 59
End: 2024-09-05
Payer: COMMERCIAL

## 2024-09-05 LAB
BASOPHILS # BLD AUTO: 0.8 % (ref 0–1.8)
BASOPHILS # BLD: 0.05 K/UL (ref 0–0.12)
EOSINOPHIL # BLD AUTO: 0.06 K/UL (ref 0–0.51)
EOSINOPHIL NFR BLD: 1 % (ref 0–6.9)
ERYTHROCYTE [DISTWIDTH] IN BLOOD BY AUTOMATED COUNT: 39.9 FL (ref 35.9–50)
HCT VFR BLD AUTO: 44.9 % (ref 37–47)
HGB BLD-MCNC: 15 G/DL (ref 12–16)
IMM GRANULOCYTES # BLD AUTO: 0.02 K/UL (ref 0–0.11)
IMM GRANULOCYTES NFR BLD AUTO: 0.3 % (ref 0–0.9)
LYMPHOCYTES # BLD AUTO: 1.24 K/UL (ref 1–4.8)
LYMPHOCYTES NFR BLD: 19.9 % (ref 22–41)
MCH RBC QN AUTO: 30.2 PG (ref 27–33)
MCHC RBC AUTO-ENTMCNC: 33.4 G/DL (ref 32.2–35.5)
MCV RBC AUTO: 90.5 FL (ref 81.4–97.8)
MONOCYTES # BLD AUTO: 0.77 K/UL (ref 0–0.85)
MONOCYTES NFR BLD AUTO: 12.4 % (ref 0–13.4)
NEUTROPHILS # BLD AUTO: 4.08 K/UL (ref 1.82–7.42)
NEUTROPHILS NFR BLD: 65.6 % (ref 44–72)
NRBC # BLD AUTO: 0 K/UL
NRBC BLD-RTO: 0 /100 WBC (ref 0–0.2)
PLATELET # BLD AUTO: 184 K/UL (ref 164–446)
PMV BLD AUTO: 10.2 FL (ref 9–12.9)
RBC # BLD AUTO: 4.96 M/UL (ref 4.2–5.4)
WBC # BLD AUTO: 6.2 K/UL (ref 4.8–10.8)

## 2024-09-05 PROCEDURE — 85025 COMPLETE CBC W/AUTO DIFF WBC: CPT

## 2024-09-05 PROCEDURE — 36415 COLL VENOUS BLD VENIPUNCTURE: CPT

## 2024-09-23 NOTE — PROCEDURES
DATE OF SERVICE:  09/24/2022     PULMONARY FUNCTION TEST INTERPRETATION     REFERRING PHYSICIAN:  Carlos Alberto Elliott MD     INTERPRETING PHYSICIAN:  Paige Portillo MD     REASON FOR STUDY:  MALT (mucosa associated lymphoid tissue).     STUDY ADEQUACY:  Good efforts, the patient met ATS standards by flow volume   loop and expiratory time.     RESULTS:  SPIROMETRY:  FVC is 2.47, which is 73% predicted without a significant change   postbronchodilator.  FEV1 is 1.89, which is 71% predicted without a significant change   postbronchodilator.  FEV1/FVC is 79.     LUNG VOLUMES:  TLC is 5.46, which is 105% predicted.  RV is 2.94, which is 147% predicted.     DIFFUSION CAPACITY:  DLCO is 21.13, which is 103% predicted.  DL/VA is 5.68, which is 144% predicted.     INTERPRETATION:  1.  Spirometry is normal.  2.  There is no significant change postbronchodilator.  3.  The flow volume loop is consistent with the spirometry data.  4.  Lung volumes are consistent with air trapping with an RV/TLC ratio of 54   without hyperinflation.  5.  Diffusion capacity is normal.  6.  In comparison to the prior study from 03/18/2022, there is now airtrapping   present and the lung volumes, which was previously not seen.        ______________________________  MD JAVIER Sherman/RORO    DD:  09/25/2022 12:34  DT:  09/25/2022 13:27    Job#:  [id number]   Detail Level: Detailed Detail Level: Generalized Detail Level: Zone

## 2024-11-04 ENCOUNTER — OFFICE VISIT (OUTPATIENT)
Dept: URGENT CARE | Facility: PHYSICIAN GROUP | Age: 59
End: 2024-11-04
Payer: COMMERCIAL

## 2024-11-04 VITALS
TEMPERATURE: 97.5 F | HEART RATE: 72 BPM | BODY MASS INDEX: 33.11 KG/M2 | RESPIRATION RATE: 16 BRPM | HEIGHT: 66 IN | WEIGHT: 206 LBS | DIASTOLIC BLOOD PRESSURE: 82 MMHG | OXYGEN SATURATION: 97 % | SYSTOLIC BLOOD PRESSURE: 122 MMHG

## 2024-11-04 DIAGNOSIS — R35.0 URINARY FREQUENCY: ICD-10-CM

## 2024-11-04 DIAGNOSIS — N30.00 ACUTE CYSTITIS WITHOUT HEMATURIA: ICD-10-CM

## 2024-11-04 LAB
APPEARANCE UR: CLEAR
BILIRUB UR STRIP-MCNC: NORMAL MG/DL
COLOR UR AUTO: YELLOW
GLUCOSE UR STRIP.AUTO-MCNC: NORMAL MG/DL
KETONES UR STRIP.AUTO-MCNC: NORMAL MG/DL
LEUKOCYTE ESTERASE UR QL STRIP.AUTO: NORMAL
NITRITE UR QL STRIP.AUTO: NORMAL
PH UR STRIP.AUTO: 5.5 [PH] (ref 5–8)
PROT UR QL STRIP: NORMAL MG/DL
RBC UR QL AUTO: NORMAL
SP GR UR STRIP.AUTO: 1.01
UROBILINOGEN UR STRIP-MCNC: 0.2 MG/DL

## 2024-11-04 PROCEDURE — 81002 URINALYSIS NONAUTO W/O SCOPE: CPT | Performed by: PHYSICIAN ASSISTANT

## 2024-11-04 PROCEDURE — 99214 OFFICE O/P EST MOD 30 MIN: CPT | Performed by: PHYSICIAN ASSISTANT

## 2024-11-04 PROCEDURE — 3074F SYST BP LT 130 MM HG: CPT | Performed by: PHYSICIAN ASSISTANT

## 2024-11-04 PROCEDURE — 3079F DIAST BP 80-89 MM HG: CPT | Performed by: PHYSICIAN ASSISTANT

## 2024-11-04 RX ORDER — NITROFURANTOIN 25; 75 MG/1; MG/1
100 CAPSULE ORAL 2 TIMES DAILY
Qty: 10 CAPSULE | Refills: 0 | Status: SHIPPED | OUTPATIENT
Start: 2024-11-04 | End: 2024-11-09

## 2024-11-04 ASSESSMENT — ENCOUNTER SYMPTOMS
CHILLS: 0
NAUSEA: 0
SWEATS: 0
FLANK PAIN: 0
VOMITING: 0

## 2024-11-04 ASSESSMENT — FIBROSIS 4 INDEX: FIB4 SCORE: 1.22

## 2024-11-04 NOTE — PROGRESS NOTES
Subjective:   Jaimee Serrano is a 59 y.o. female who presents for Urinary Frequency (x1week) and Dysuria        Dysuria   This is a new problem. The current episode started in the past 7 days. The problem has been gradually worsening. The quality of the pain is described as burning. There has been no fever. There is No history of pyelonephritis. Associated symptoms include frequency and urgency. Pertinent negatives include no chills, discharge, flank pain, hematuria, hesitancy, nausea, sweats or vomiting. She has tried increased fluids for the symptoms. The treatment provided mild relief. There is no history of recurrent UTIs.     Review of Systems   Constitutional:  Negative for chills.   Gastrointestinal:  Negative for nausea and vomiting.   Genitourinary:  Positive for dysuria, frequency and urgency. Negative for flank pain, hematuria and hesitancy.       PMH:  has a past medical history of Acquired hypothyroidism, Arthritis, Asthma, Back pain, Breath shortness, Cancer (Formerly [facility]) (2019), Chickenpox, COPD (chronic obstructive pulmonary disease) (Formerly [facility]), Crohns disease, Daytime sleepiness, Essential hypertension, Fatigue, GERD (gastroesophageal reflux disease), High cholesterol, Hypothyroidism, IBD (inflammatory bowel disease) (06/14/2021), Influenza, Insomnia, Lung cancer ([facility]), MALT lymphoma ([facility]), Obesity, Pulmonary emphysema ([facility]ly [facility]), Pulmonary nodules, Restless leg syndrome, Shortness of breath, Type 2 diabetes mellitus without complication, with long-term current use of insulin ([facility]), Wears glasses, and Wheezing.    She has no past medical history of Cough, Painful breathing, or Sputum production.  MEDS:   Current Outpatient Medications:     MOUNJARO 12.5 MG/0.5ML Solution Pen-injector, ADMINISTER 12.5 MG UNDER THE SKIN WEEKLY, Disp: , Rfl:     nitrofurantoin (MACROBID) 100 MG Cap, Take 1 Capsule by mouth 2 times a day for 5 days., Disp: 10 Capsule, Rfl: 0    omeprazole (PRILOSEC) 40 MG delayed-release capsule,  Take 40 mg by mouth every day., Disp: , Rfl:     levothyroxine (SYNTHROID) 100 MCG Tab, Take 1 Tablet by mouth every morning on an empty stomach., Disp: 90 Tablet, Rfl: 3    atorvastatin (LIPITOR) 40 MG Tab, Take 1 Tablet by mouth at bedtime., Disp: 90 Tablet, Rfl: 3    ramipril (ALTACE) 2.5 MG Cap, Take 1 Capsule by mouth every day., Disp: 90 Capsule, Rfl: 3    aspirin 81 MG EC tablet, Take 1 Tablet by mouth every day., Disp: 90 Tablet, Rfl: 3    ONETOUCH VERIO strip, , Disp: , Rfl:     therapeutic multivitamin-minerals (THERAGRAN-M) Tab, Take 1 Tablet by mouth every day., Disp: , Rfl:     Blood Glucose Test Strips, Use one One Touch Verio Flex strip to test blood sugar 4 times daily., Disp: 400 Strip, Rfl: 0    metoprolol tartrate (LOPRESSOR) 25 MG Tab, Take 1 Tablet by mouth 2 times a day as needed (palpitations). (Patient not taking: Reported on 2024), Disp: 180 Tablet, Rfl: 3    Continuous Blood Gluc Sensor (FREESTYLE SAADIA 2 SENSOR) Mis, APPLY 1 SENSOR TOPICALLY EVERY 14 DAYS AS DIRECTED (Patient not taking: Reported on 2024), Disp: , Rfl:   ALLERGIES:   Allergies   Allergen Reactions    Paclitaxel Anaphylaxis     Tongue swelling, difficulty breathing, felt like she could have      SURGHX:   Past Surgical History:   Procedure Laterality Date    PB PALMAR FASCIECTOMY Left 3/3/2022    Procedure: LEFT INDEX FINGER FASCIECTOMY;  Surgeon: Brien Villagomez M.D.;  Location: SURGERY SAME DAY [facility];  Service: Orthopedics    GASTRIC BYPASS LAPAROSCOPIC  2016    sleeve    ABDOMINAL HYSTERECTOMY TOTAL      NADER + SBO    BRONCHOSCOPY      CARPAL TUNNEL RELEASE      CHOLECYSTECTOMY      LUNG BIOPSY OPEN      SLEEVE,JESSICA VASO THIGH       SOCHX:  reports that she has never smoked. She has never used smokeless tobacco. She reports current alcohol use. She reports that she does not use drugs.  FH: Family history was reviewed, no pertinent findings to report   Objective:   /82   Pulse 72   Temp 36.4 °C  "(97.5 °F) (Temporal)   Resp 16   Ht 1.676 m (5' 6\")   Wt 93.4 kg (206 lb)   SpO2 97%   BMI 33.25 kg/m²   Physical Exam  Vitals reviewed.   Constitutional:       General: She is not in acute distress.     Appearance: Normal appearance. She is well-developed. She is not toxic-appearing.   HENT:      Head: Normocephalic and atraumatic.      Right Ear: External ear normal.      Left Ear: External ear normal.      Nose: Nose normal.   Cardiovascular:      Rate and Rhythm: Normal rate and regular rhythm.      Heart sounds: Normal heart sounds, S1 normal and S2 normal.   Pulmonary:      Effort: Pulmonary effort is normal. No respiratory distress.      Breath sounds: Normal breath sounds. No stridor. No decreased breath sounds, wheezing, rhonchi or rales.   Abdominal:      Tenderness: There is no abdominal tenderness. There is no right CVA tenderness or left CVA tenderness.   Skin:     General: Skin is dry.   Neurological:      Comments: Alert and oriented.    Psychiatric:         Speech: Speech normal.         Behavior: Behavior normal.           Assessment/Plan:   1. Acute cystitis without hematuria  - nitrofurantoin (MACROBID) 100 MG Cap; Take 1 Capsule by mouth 2 times a day for 5 days.  Dispense: 10 Capsule; Refill: 0    2. Urinary frequency  - POCT Urinalysis  - URINE CULTURE    Other orders  - MOUNJARO 12.5 MG/0.5ML Solution Pen-injector; ADMINISTER 12.5 MG UNDER THE SKIN WEEKLY      UA + leuks. UA and hx consistent with acute cystitis. Pyelonephritis unlikely as pt has no flank pain, CVA tenderness, N/V, F/C. Low clinical suspicion for stone pathology.    Pt started on abx therapy.  Continue to drink plenty of fluids.  Avoid holding urine/ empty bladder frequently.  Pyridium/ Azo prn pain. Alternatively my take tylenol or ibuprofen, if permitted.  Urine cx pending. I will contact pt with results via MyChart and adjust treatment plan, as indicated.    If sx fail to improve in 48 hrs, new sx develop at any point, " or sx worsen RTC or see PCP for reevaluation.    If pt develops fevers, abdominal pain, flank pain, nausea/ vomiting or other severe and concerning symptoms --> to ED for reevaluation.

## 2024-12-06 ENCOUNTER — OFFICE VISIT (OUTPATIENT)
Dept: URGENT CARE | Facility: PHYSICIAN GROUP | Age: 59
End: 2024-12-06
Payer: COMMERCIAL

## 2024-12-06 ENCOUNTER — HOSPITAL ENCOUNTER (OUTPATIENT)
Facility: MEDICAL CENTER | Age: 59
End: 2024-12-06
Payer: COMMERCIAL

## 2024-12-06 VITALS
RESPIRATION RATE: 16 BRPM | SYSTOLIC BLOOD PRESSURE: 124 MMHG | BODY MASS INDEX: 34.23 KG/M2 | HEIGHT: 66 IN | OXYGEN SATURATION: 97 % | HEART RATE: 74 BPM | TEMPERATURE: 97.6 F | WEIGHT: 213 LBS | DIASTOLIC BLOOD PRESSURE: 80 MMHG

## 2024-12-06 DIAGNOSIS — R39.9 UTI SYMPTOMS: ICD-10-CM

## 2024-12-06 DIAGNOSIS — R30.0 DYSURIA: ICD-10-CM

## 2024-12-06 LAB
APPEARANCE UR: NORMAL
BILIRUB UR STRIP-MCNC: NORMAL MG/DL
COLOR UR AUTO: YELLOW
GLUCOSE UR STRIP.AUTO-MCNC: NORMAL MG/DL
KETONES UR STRIP.AUTO-MCNC: NORMAL MG/DL
LEUKOCYTE ESTERASE UR QL STRIP.AUTO: NORMAL
NITRITE UR QL STRIP.AUTO: NORMAL
PH UR STRIP.AUTO: 5 [PH] (ref 5–8)
PROT UR QL STRIP: NORMAL MG/DL
RBC UR QL AUTO: NORMAL
SP GR UR STRIP.AUTO: 1.02
UROBILINOGEN UR STRIP-MCNC: 0.2 MG/DL

## 2024-12-06 PROCEDURE — 3079F DIAST BP 80-89 MM HG: CPT

## 2024-12-06 PROCEDURE — 87086 URINE CULTURE/COLONY COUNT: CPT

## 2024-12-06 PROCEDURE — 3074F SYST BP LT 130 MM HG: CPT

## 2024-12-06 PROCEDURE — 81002 URINALYSIS NONAUTO W/O SCOPE: CPT

## 2024-12-06 PROCEDURE — 99213 OFFICE O/P EST LOW 20 MIN: CPT

## 2024-12-06 PROCEDURE — 87077 CULTURE AEROBIC IDENTIFY: CPT

## 2024-12-06 PROCEDURE — 87186 SC STD MICRODIL/AGAR DIL: CPT

## 2024-12-06 RX ORDER — CEFDINIR 300 MG/1
300 CAPSULE ORAL 2 TIMES DAILY
Qty: 10 CAPSULE | Refills: 0 | Status: SHIPPED | OUTPATIENT
Start: 2024-12-06 | End: 2024-12-11

## 2024-12-06 ASSESSMENT — ENCOUNTER SYMPTOMS
WEIGHT LOSS: 0
ABDOMINAL PAIN: 0
PALPITATIONS: 0
COUGH: 0

## 2024-12-06 ASSESSMENT — FIBROSIS 4 INDEX: FIB4 SCORE: 1.22

## 2024-12-06 NOTE — PROGRESS NOTES
"Subjective:   Jaimee Serrano is a 59 y.o. female who presents for Follow-Up, Urinary Frequency, and Painful Urination      HPI: Patient was seen 11/4/2024 for acute cystitis. C&S ordered however status/collection unknown. Patient here today for continued symptoms of urinary frequency, urgency, and burning. Patient \"felt better from last month but feels like it did not quite go away this time around, needed longer treatment\". No fevers, chills, back/flank pain. No NV, tolerating PO intake well.    Review of Systems   Constitutional:  Negative for malaise/fatigue and weight loss.   Respiratory:  Negative for cough.    Cardiovascular:  Negative for chest pain and palpitations.   Gastrointestinal:  Negative for abdominal pain.   Genitourinary:  Positive for dysuria, frequency and urgency. Negative for hematuria.   All other systems reviewed and are negative.      Medications:    aspirin  atorvastatin Tabs  Blood Glucose Test Strips  FreeStyle Bhavik 2 Sensor Misc  levothyroxine Tabs  Mounjaro Sopn  omeprazole  OneTouch Verio Strp  ramipril Caps  therapeutic multivitamin-minerals Tabs    Allergies: Paclitaxel    Problem List: Jaimee Serrano does not have any pertinent problems on file.    Surgical History:  Past Surgical History:   Procedure Laterality Date    PB PALMAR FASCIECTOMY Left 3/3/2022    Procedure: LEFT INDEX FINGER FASCIECTOMY;  Surgeon: Brien Villagomez M.D.;  Location: SURGERY SAME DAY [facility];  Service: Orthopedics    GASTRIC BYPASS LAPAROSCOPIC  2016    sleeve    ABDOMINAL HYSTERECTOMY TOTAL      NADER + SBO    BRONCHOSCOPY      CARPAL TUNNEL RELEASE      CHOLECYSTECTOMY      LUNG BIOPSY OPEN      SLEEVE,JESSICA VASO THIGH         Past Social Hx: Jaimee Serrano  reports that she has never smoked. She has never used smokeless tobacco. She reports current alcohol use. She reports that she does not use drugs.     Past Family Hx:  Jaimee Serrano family history includes Breast Cancer in her mother; " "Heart Disease in her mother; Heart Failure in her father; No Known Problems in an other family member.     Problem list, medications, and allergies reviewed by myself today in Epic.     Objective:     /80   Pulse 74   Temp 36.4 °C (97.6 °F) (Temporal)   Resp 16   Ht 1.676 m (5' 6\")   Wt 96.6 kg (213 lb)   SpO2 97%   BMI 34.38 kg/m²     Physical Exam  Vitals reviewed.   Constitutional:       General: She is not in acute distress.     Appearance: Normal appearance. She is not ill-appearing, toxic-appearing or diaphoretic.   HENT:      Nose: Nose normal.      Mouth/Throat:      Mouth: Mucous membranes are moist.      Pharynx: Oropharynx is clear.   Eyes:      Pupils: Pupils are equal, round, and reactive to light.   Cardiovascular:      Pulses: Normal pulses.      Heart sounds: Normal heart sounds.   Pulmonary:      Effort: Pulmonary effort is normal.      Breath sounds: Normal breath sounds.   Abdominal:      General: Abdomen is flat. Bowel sounds are normal.      Palpations: Abdomen is soft.      Tenderness: There is no abdominal tenderness. There is no right CVA tenderness, left CVA tenderness or guarding.   Musculoskeletal:         General: Normal range of motion.      Cervical back: Normal range of motion.   Skin:     General: Skin is warm and dry.      Capillary Refill: Capillary refill takes less than 2 seconds.   Neurological:      General: No focal deficit present.      Mental Status: She is alert and oriented to person, place, and time.   Psychiatric:         Mood and Affect: Mood normal.       Assessment & Plan  Dysuria    Orders:    POCT Urinalysis    URINE CULTURE(NEW); Future    UTI symptoms            MDM/Comments:     Small leukocytes on UA. Culture pending. Provided antibiotics given patient's symptoms and history of pyelo and recurrent UTIs. Most recent labs reviewed and CrCl calculated. May take OTC medications for pain. Discussed potential side effects of medication.     Differential " diagnosis, natural history, supportive care, and indications for immediate follow-up discussed. Advised the patient to follow-up with PCP for recheck, reevaluation, and consideration of further management.    Christopher Taylor DNP, APRN, FNP-BC

## 2024-12-09 LAB
BACTERIA UR CULT: ABNORMAL
BACTERIA UR CULT: ABNORMAL
SIGNIFICANT IND 70042: ABNORMAL
SITE SITE: ABNORMAL
SOURCE SOURCE: ABNORMAL

## 2024-12-11 ENCOUNTER — TELEPHONE (OUTPATIENT)
Dept: URGENT CARE | Facility: PHYSICIAN GROUP | Age: 59
End: 2024-12-11
Payer: COMMERCIAL

## 2024-12-11 NOTE — TELEPHONE ENCOUNTER
Contacted via phone call 12/11, left voicemail to check in - call back Urgent Care to discuss results

## 2024-12-15 ENCOUNTER — SUPERVISING PHYSICIAN REVIEW (OUTPATIENT)
Dept: URGENT CARE | Facility: PHYSICIAN GROUP | Age: 59
End: 2024-12-15
Payer: COMMERCIAL

## 2025-02-09 ENCOUNTER — HOSPITAL ENCOUNTER (OUTPATIENT)
Facility: MEDICAL CENTER | Age: 60
End: 2025-02-09
Attending: NURSE PRACTITIONER
Payer: COMMERCIAL

## 2025-02-09 ENCOUNTER — OFFICE VISIT (OUTPATIENT)
Dept: URGENT CARE | Facility: PHYSICIAN GROUP | Age: 60
End: 2025-02-09
Payer: COMMERCIAL

## 2025-02-09 VITALS
TEMPERATURE: 97 F | HEIGHT: 65 IN | DIASTOLIC BLOOD PRESSURE: 78 MMHG | HEART RATE: 74 BPM | OXYGEN SATURATION: 98 % | BODY MASS INDEX: 36.89 KG/M2 | WEIGHT: 221.4 LBS | RESPIRATION RATE: 18 BRPM | SYSTOLIC BLOOD PRESSURE: 126 MMHG

## 2025-02-09 DIAGNOSIS — R39.9 LOWER URINARY TRACT SYMPTOMS (LUTS): ICD-10-CM

## 2025-02-09 LAB
APPEARANCE UR: CLEAR
BILIRUB UR STRIP-MCNC: NORMAL MG/DL
COLOR UR AUTO: YELLOW
GLUCOSE UR STRIP.AUTO-MCNC: 1000 MG/DL
KETONES UR STRIP.AUTO-MCNC: NORMAL MG/DL
LEUKOCYTE ESTERASE UR QL STRIP.AUTO: NORMAL
NITRITE UR QL STRIP.AUTO: NORMAL
PH UR STRIP.AUTO: 5 [PH] (ref 5–8)
PROT UR QL STRIP: NORMAL MG/DL
RBC UR QL AUTO: NORMAL
SP GR UR STRIP.AUTO: 1.02
UROBILINOGEN UR STRIP-MCNC: 0.2 MG/DL

## 2025-02-09 PROCEDURE — 87077 CULTURE AEROBIC IDENTIFY: CPT

## 2025-02-09 PROCEDURE — 87086 URINE CULTURE/COLONY COUNT: CPT

## 2025-02-09 PROCEDURE — 87186 SC STD MICRODIL/AGAR DIL: CPT

## 2025-02-09 PROCEDURE — 3074F SYST BP LT 130 MM HG: CPT | Performed by: NURSE PRACTITIONER

## 2025-02-09 PROCEDURE — 3078F DIAST BP <80 MM HG: CPT | Performed by: NURSE PRACTITIONER

## 2025-02-09 PROCEDURE — 1126F AMNT PAIN NOTED NONE PRSNT: CPT | Performed by: NURSE PRACTITIONER

## 2025-02-09 PROCEDURE — 81002 URINALYSIS NONAUTO W/O SCOPE: CPT | Performed by: NURSE PRACTITIONER

## 2025-02-09 PROCEDURE — 99213 OFFICE O/P EST LOW 20 MIN: CPT | Performed by: NURSE PRACTITIONER

## 2025-02-09 RX ORDER — TIRZEPATIDE 12.5 MG/.5ML
INJECTION, SOLUTION SUBCUTANEOUS
COMMUNITY
End: 2025-02-09

## 2025-02-09 RX ORDER — CEFDINIR 300 MG/1
300 CAPSULE ORAL 2 TIMES DAILY
Qty: 10 CAPSULE | Refills: 0 | Status: SHIPPED | OUTPATIENT
Start: 2025-02-09 | End: 2025-02-14

## 2025-02-09 ASSESSMENT — ENCOUNTER SYMPTOMS
DIARRHEA: 0
ABDOMINAL PAIN: 1
NAUSEA: 0
BACK PAIN: 0
ORTHOPNEA: 0
FLANK PAIN: 0
HEADACHES: 0
FEVER: 0
DIZZINESS: 0
CHILLS: 0
VOMITING: 0

## 2025-02-09 ASSESSMENT — FIBROSIS 4 INDEX: FIB4 SCORE: 1.22

## 2025-02-09 ASSESSMENT — PAIN SCALES - GENERAL: PAINLEVEL_OUTOF10: NO PAIN

## 2025-02-09 NOTE — PROGRESS NOTES
Subjective     Stefania Stacie Serrano is a 59 y.o. female who presents with Dysuria (Burning & urgency with urination, cloudy urine, for 2-3 weeks)            HPI  New problem.  Patient is a 59-year-old female who presents with a 2-3 History of burning with urination, urgency, and cloudy urine.  She denies any fever, chills, nausea, or back pain.  She has been treated with her last UTI with a 3-day course of something which I assume is Bactrim however cannot find it on her chart.    Paclitaxel and Seasonal  Current Outpatient Medications on File Prior to Visit   Medication Sig Dispense Refill    MOUNJARO 12.5 MG/0.5ML Solution Pen-injector ADMINISTER 12.5 MG UNDER THE SKIN WEEKLY      omeprazole (PRILOSEC) 40 MG delayed-release capsule Take 40 mg by mouth every day.      levothyroxine (SYNTHROID) 100 MCG Tab Take 1 Tablet by mouth every morning on an empty stomach. 90 Tablet 3    atorvastatin (LIPITOR) 40 MG Tab Take 1 Tablet by mouth at bedtime. 90 Tablet 3    ramipril (ALTACE) 2.5 MG Cap Take 1 Capsule by mouth every day. 90 Capsule 3    ONETOUCH VERIO strip       therapeutic multivitamin-minerals (THERAGRAN-M) Tab Take 1 Tablet by mouth every day.       No current facility-administered medications on file prior to visit.     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      Social Drivers of Health     Financial Resource Strain: Not on  "file   Food Insecurity: Not on file   Transportation Needs: Not on file   Physical Activity: Not on file   Stress: Not on file   Social Connections: Not on file   Intimate Partner Violence: Not on file   Housing Stability: Not on file     Breast Cancer-related family history is not on file.        Review of Systems   Constitutional:  Negative for chills and fever.   Cardiovascular:  Negative for chest pain and orthopnea.   Gastrointestinal:  Positive for abdominal pain. Negative for diarrhea, nausea and vomiting.   Genitourinary:  Positive for dysuria, frequency and urgency. Negative for flank pain and hematuria.   Musculoskeletal:  Negative for back pain.   Neurological:  Negative for dizziness and headaches.   All other systems reviewed and are negative.             Objective     /78 (BP Location: Left arm, Patient Position: Sitting, BP Cuff Size: Adult)   Pulse 74   Temp 36.1 °C (97 °F) (Temporal)   Resp 18   Ht 1.651 m (5' 5\")   Wt 100 kg (221 lb 6.4 oz)   SpO2 98%   BMI 36.84 kg/m²      Physical Exam  Vitals reviewed.   Constitutional:       General: She is not in acute distress.     Appearance: She is well-developed.   Cardiovascular:      Rate and Rhythm: Normal rate and regular rhythm.      Heart sounds: Normal heart sounds. No murmur heard.  Pulmonary:      Effort: Pulmonary effort is normal. No respiratory distress.      Breath sounds: Normal breath sounds.   Abdominal:      General: Bowel sounds are normal.      Palpations: Abdomen is soft.      Tenderness: There is abdominal tenderness in the suprapubic area. There is no right CVA tenderness or left CVA tenderness.   Musculoskeletal:         General: Normal range of motion.      Comments: Moves all 4 extremities normally   Skin:     General: Skin is warm and dry.   Neurological:      Mental Status: She is alert and oriented to person, place, and time.   Psychiatric:         Behavior: Behavior normal.         Thought Content: Thought content " normal.                             Assessment & Plan   1. Lower urinary tract symptoms (LUTS)  POCT Urinalysis    URINE CULTURE(NEW)    URINE CULTURE(NEW)    cefdinir (OMNICEF) 300 MG Cap        Negative U/A other than glucose (diabetic)  Culture and cefdinir- she will stop if culture is negative.  Differential diagnosis, natural history, supportive care, and indications for immediate follow-up were discussed.        Assessment & Plan  Lower urinary tract symptoms (LUTS)    Orders:    POCT Urinalysis

## 2025-02-10 DIAGNOSIS — R39.9 LOWER URINARY TRACT SYMPTOMS (LUTS): ICD-10-CM

## 2025-02-18 ENCOUNTER — OFFICE VISIT (OUTPATIENT)
Dept: MEDICAL GROUP | Facility: PHYSICIAN GROUP | Age: 60
End: 2025-02-18
Payer: COMMERCIAL

## 2025-02-18 ENCOUNTER — HOSPITAL ENCOUNTER (OUTPATIENT)
Facility: MEDICAL CENTER | Age: 60
End: 2025-02-18
Attending: FAMILY MEDICINE
Payer: COMMERCIAL

## 2025-02-18 VITALS
DIASTOLIC BLOOD PRESSURE: 70 MMHG | BODY MASS INDEX: 36.32 KG/M2 | HEART RATE: 84 BPM | SYSTOLIC BLOOD PRESSURE: 126 MMHG | OXYGEN SATURATION: 97 % | HEIGHT: 65 IN | TEMPERATURE: 97.2 F | WEIGHT: 218 LBS

## 2025-02-18 DIAGNOSIS — Z79.4 TYPE 2 DIABETES MELLITUS WITHOUT COMPLICATION, WITH LONG-TERM CURRENT USE OF INSULIN (HCC): ICD-10-CM

## 2025-02-18 DIAGNOSIS — E11.9 TYPE 2 DIABETES MELLITUS WITHOUT COMPLICATION, WITH LONG-TERM CURRENT USE OF INSULIN (HCC): ICD-10-CM

## 2025-02-18 DIAGNOSIS — L98.9 SKIN LESION: ICD-10-CM

## 2025-02-18 DIAGNOSIS — N23 URINARY TRACT PAIN: ICD-10-CM

## 2025-02-18 PROBLEM — E11.65 UNCONTROLLED TYPE 2 DIABETES MELLITUS WITH HYPERGLYCEMIA (HCC): Status: ACTIVE | Noted: 2019-03-18

## 2025-02-18 LAB
AMBIGUOUS DTTM AMBI4: NORMAL
APPEARANCE UR: CLEAR
BILIRUB UR STRIP-MCNC: NORMAL MG/DL
COLOR UR AUTO: YELLOW
GLUCOSE UR STRIP.AUTO-MCNC: 1000 MG/DL
HBA1C MFR BLD: 8.7 % (ref ?–5.8)
KETONES UR STRIP.AUTO-MCNC: NORMAL MG/DL
LEUKOCYTE ESTERASE UR QL STRIP.AUTO: NORMAL
NITRITE UR QL STRIP.AUTO: NORMAL
PH UR STRIP.AUTO: 5.5 [PH] (ref 5–8)
POCT INT CON NEG: NEGATIVE
POCT INT CON POS: POSITIVE
PROT UR QL STRIP: NORMAL MG/DL
RBC UR QL AUTO: NORMAL
SP GR UR STRIP.AUTO: 1.02
UROBILINOGEN UR STRIP-MCNC: 0.2 MG/DL

## 2025-02-18 PROCEDURE — 3074F SYST BP LT 130 MM HG: CPT | Performed by: FAMILY MEDICINE

## 2025-02-18 PROCEDURE — 81002 URINALYSIS NONAUTO W/O SCOPE: CPT | Performed by: FAMILY MEDICINE

## 2025-02-18 PROCEDURE — 99214 OFFICE O/P EST MOD 30 MIN: CPT | Performed by: FAMILY MEDICINE

## 2025-02-18 PROCEDURE — 3078F DIAST BP <80 MM HG: CPT | Performed by: FAMILY MEDICINE

## 2025-02-18 PROCEDURE — 87077 CULTURE AEROBIC IDENTIFY: CPT

## 2025-02-18 PROCEDURE — 83036 HEMOGLOBIN GLYCOSYLATED A1C: CPT | Performed by: FAMILY MEDICINE

## 2025-02-18 PROCEDURE — 87086 URINE CULTURE/COLONY COUNT: CPT

## 2025-02-18 RX ORDER — PIOGLITAZONE 30 MG/1
TABLET ORAL
COMMUNITY
End: 2025-02-18

## 2025-02-18 ASSESSMENT — FIBROSIS 4 INDEX: FIB4 SCORE: 1.22

## 2025-02-18 ASSESSMENT — PATIENT HEALTH QUESTIONNAIRE - PHQ9: CLINICAL INTERPRETATION OF PHQ2 SCORE: 0

## 2025-02-18 NOTE — PROGRESS NOTES
Verbal consent was acquired by the patient to use idiag ambient listening note generation during this visit.     Subjective:     HPI:   History of Present Illness  The patient presents for a follow-up visit.    She was diagnosed with a urinary tract infection (UTI) approximately 9 days ago and was prescribed cefdinir, which she completed a 5-day course of 3 days ago. However, she experienced a recurrence of dysuria this morning. She reports no suprapubic pain but notes increased urinary frequency and urgency. She is not experiencing any systemic symptoms such as fever or chills. She has a history of recurrent UTIs since the age of 11, with periods of remission lasting up to 2 years. She reports no postmenopausal bleeding or vaginal discharge. She underwent a hysterectomy 4 to 5 years ago and does not experience vaginal dryness or pruritus. She is due for her last Pap smear, which will be conducted by her oncologist before transitioning care to her gynecologist. She has previously responded well to ciprofloxacin for her UTIs. She has no known allergies to antibiotics.    She is currently on Mounjaro for diabetes management. During her recent visit to her endocrinologist, her HbA1c level was found to be elevated at 8.7%, marking the first significant increase in 2 years. This is attributed to irregular use of Mounjaro. She has not undergone any recent laboratory tests. Her endocrinologist has prescribed Xigduo, which she initiated today.    She has been experiencing a non-acne facial lesion for the past 3 months. The lesion is not painful but becomes scaly and pruritic. She occasionally peels off the scale, which regenerates within 2 to 3 days. The lesion does not resemble a mole or age spot and has remained the same size. She has not consulted a dermatologist for this issue. She has attempted treatment with Zit cream without success.    She has been cleared to return to work by her oncologist but reports  "significant fatigue, with work consuming nearly all her energy. She has expressed a desire to maintain some energy reserves for exercise and overall health. She has undergone 4 rounds of chemotherapy and is currently classified as disabled.    ALLERGIES  The patient has no known allergies.    MEDICATIONS  Current: cefdinir, metformin, Xigduo    Health Maintenance: Completed    Objective:     Exam:  /70 (BP Location: Right arm, Patient Position: Sitting, BP Cuff Size: Large adult)   Pulse 84   Temp 36.2 °C (97.2 °F) (Temporal)   Ht 1.651 m (5' 5\")   Wt 98.9 kg (218 lb)   SpO2 97%   BMI 36.28 kg/m²  Body mass index is 36.28 kg/m².    Physical Exam  Vitals reviewed.   Constitutional:       Appearance: Normal appearance.   HENT:      Head: Normocephalic and atraumatic.      Right Ear: External ear normal.      Left Ear: External ear normal.      Nose: Nose normal.   Cardiovascular:      Rate and Rhythm: Normal rate and regular rhythm.      Pulses: Normal pulses.      Heart sounds: Normal heart sounds. No murmur heard.  Pulmonary:      Effort: Pulmonary effort is normal. No respiratory distress.      Breath sounds: Normal breath sounds. No wheezing.   Abdominal:      General: Abdomen is flat.      Palpations: Abdomen is soft.   Skin:     General: Skin is warm and dry.   Neurological:      Mental Status: She is alert and oriented to person, place, and time.   Psychiatric:         Mood and Affect: Mood normal.         Behavior: Behavior normal.       Small pruritic scab lesion located on the left cheek , No underlying nevi or seborrheic keratosis.  Dry skin noted throughout.      Results  Laboratory Studies  A1c was 8.7. Urine culture grew E. coli. Glucose present in urine.    Assessment & Plan:     1. Urinary tract pain  POCT Urinalysis      2. Type 2 diabetes mellitus without complication, with long-term current use of insulin ([facility])  POCT  A1C    URINE CULTURE(NEW)      3. Skin lesion  Referral to Dermatology "    CANCELED: Referral to Dermatology          Assessment & Plan  1. Urinary Tract Infection (UTI).  The patient was treated with cefdinir for 5 days, but symptoms of burning and increased frequency have recurred. The urine culture previously grew E. coli. The presence of glucose in the urine suggests uncontrolled diabetes, which may contribute to the increased frequency of urination and higher risk of infection. She is advised to urinate after sexual intercourse and maintain good hygiene. A urine culture will be sent for further analysis. If the urine culture returns positive, a stronger antibiotic such as ciprofloxacin will be prescribed.    2. Diabetes Mellitus.  Her A1c was recently noted to be 8.7, indicating poor glycemic control. She has started Xigduo today. She is advised to continue her current medication regimen and monitor her blood sugar levels closely. Fasting labs will be ordered to check kidney function, cholesterol, liver function, thyroid, and complete blood count. She is advised to complete these labs on Friday morning.    3. Facial Lesion.  The lesion has been present for 3 months, is scaly, and itches but does not appear to be a mole or age spot. It does not exhibit typical characteristics of skin cancer. She is advised to apply over-the-counter topical cortisone, Aquaphor, or Vaseline to the affected area and keep the skin well-hydrated. Antihistamines such as Benadryl can be used for itching. A referral to dermatology will be made for further evaluation and potential biopsy.    4. Disability Management.  She reports fatigue and difficulty managing work due to her cancer treatment history. She is advised to discuss her work situation with her oncologist to explore potential accommodations or part-time work arrangements. If additional paperwork is needed for disability claims, assistance will be provided.    PROCEDURE  The patient underwent a hysterectomy 4 to 5 years ago.    Return in about 3  months (around 5/18/2025) for F/u DM.    Please note that this dictation was created using voice recognition software. I have made every reasonable attempt to correct obvious errors, but I expect that there are errors of grammar and possibly content that I did not discover before finalizing the note.    Dai Roberto MD  Family Medicine and Non - Operative Sports Medicine   [facility]

## 2025-02-19 NOTE — Clinical Note
REFERRAL APPROVAL NOTICE         Sent on February 19, 2025                   Stefania Serrano  [address] [place] [address]                   Dear Ms. Serrano,    After a careful review of the medical information and benefit coverage, [facility] has processed your referral. See below for additional details.    If applicable, you must be actively enrolled with your insurance for coverage of the authorized service. If you have any questions regarding your coverage, please contact your insurance directly.    REFERRAL INFORMATION   Referral #:  [id number]  Referred-To Provider    Referred-By Provider:  Dermatology    KAMLESH Rasheed KEITH M      [address]  [place] [address]  [phone] [address] [facility] [address] [place] [address]  [phone]    Referral Start Date:  02/18/2025  Referral End Date:   02/18/2026             SCHEDULING  If you do not already have an appointment, please call [phone] to make an appointment.     MORE INFORMATION  If you do not already have a [employer] account, sign up at: [email or web address]  You can access your medical information, make appointments, see lab results, billing information, and more.  If you have questions regarding this referral, please contact  the [facility] Referrals department at:             [phone]. Monday - Friday 8:00AM - 5:00PM.     Sincerely,    [facility]

## 2025-02-21 LAB
BACTERIA UR CULT: NORMAL
SIGNIFICANT IND 70042: NORMAL
SITE SITE: NORMAL
SOURCE SOURCE: NORMAL

## 2025-02-23 ENCOUNTER — RESULTS FOLLOW-UP (OUTPATIENT)
Dept: MEDICAL GROUP | Facility: PHYSICIAN GROUP | Age: 60
End: 2025-02-23

## 2025-02-25 ENCOUNTER — HOSPITAL ENCOUNTER (OUTPATIENT)
Dept: LAB | Facility: MEDICAL CENTER | Age: 60
End: 2025-02-25
Attending: FAMILY MEDICINE
Payer: COMMERCIAL

## 2025-02-25 DIAGNOSIS — Z00.00 ENCOUNTER FOR PREVENTATIVE ADULT HEALTH CARE EXAMINATION: ICD-10-CM

## 2025-02-25 DIAGNOSIS — Z11.3 SCREEN FOR STD (SEXUALLY TRANSMITTED DISEASE): ICD-10-CM

## 2025-02-25 DIAGNOSIS — Z79.4 TYPE 2 DIABETES MELLITUS WITHOUT COMPLICATION, WITH LONG-TERM CURRENT USE OF INSULIN (HCC): ICD-10-CM

## 2025-02-25 DIAGNOSIS — E78.5 DYSLIPIDEMIA: ICD-10-CM

## 2025-02-25 DIAGNOSIS — E03.9 ACQUIRED HYPOTHYROIDISM: ICD-10-CM

## 2025-02-25 DIAGNOSIS — E11.9 TYPE 2 DIABETES MELLITUS WITHOUT COMPLICATION, WITH LONG-TERM CURRENT USE OF INSULIN (HCC): ICD-10-CM

## 2025-02-25 LAB
ALBUMIN SERPL BCP-MCNC: 4.1 G/DL (ref 3.2–4.9)
ALBUMIN/GLOB SERPL: 1.2 G/DL
ALP SERPL-CCNC: 84 U/L (ref 30–99)
ALT SERPL-CCNC: 40 U/L (ref 2–50)
ANION GAP SERPL CALC-SCNC: 11 MMOL/L (ref 7–16)
AST SERPL-CCNC: 33 U/L (ref 12–45)
BILIRUB SERPL-MCNC: 0.6 MG/DL (ref 0.1–1.5)
BUN SERPL-MCNC: 16 MG/DL (ref 8–22)
CALCIUM ALBUM COR SERPL-MCNC: 9 MG/DL (ref 8.5–10.5)
CALCIUM SERPL-MCNC: 9.1 MG/DL (ref 8.5–10.5)
CHLORIDE SERPL-SCNC: 104 MMOL/L (ref 96–112)
CHOLEST SERPL-MCNC: 189 MG/DL (ref 100–199)
CO2 SERPL-SCNC: 24 MMOL/L (ref 20–33)
CREAT SERPL-MCNC: 0.78 MG/DL (ref 0.5–1.4)
CREAT UR-MCNC: 205 MG/DL
EST. AVERAGE GLUCOSE BLD GHB EST-MCNC: 235 MG/DL
FASTING STATUS PATIENT QL REPORTED: NORMAL
GFR SERPLBLD CREATININE-BSD FMLA CKD-EPI: 86 ML/MIN/1.73 M 2
GLOBULIN SER CALC-MCNC: 3.5 G/DL (ref 1.9–3.5)
GLUCOSE SERPL-MCNC: 192 MG/DL (ref 65–99)
HBA1C MFR BLD: 9.8 % (ref 4–5.6)
HDLC SERPL-MCNC: 59 MG/DL
HIV 1+2 AB+HIV1 P24 AG SERPL QL IA: NORMAL
LDLC SERPL CALC-MCNC: 93 MG/DL
MICROALBUMIN UR-MCNC: 2.9 MG/DL
MICROALBUMIN/CREAT UR: 14 MG/G (ref 0–30)
POTASSIUM SERPL-SCNC: 4.3 MMOL/L (ref 3.6–5.5)
PROT SERPL-MCNC: 7.6 G/DL (ref 6–8.2)
SODIUM SERPL-SCNC: 139 MMOL/L (ref 135–145)
TRIGL SERPL-MCNC: 183 MG/DL (ref 0–149)

## 2025-02-25 PROCEDURE — 80053 COMPREHEN METABOLIC PANEL: CPT

## 2025-02-25 PROCEDURE — 82043 UR ALBUMIN QUANTITATIVE: CPT

## 2025-02-25 PROCEDURE — 84439 ASSAY OF FREE THYROXINE: CPT

## 2025-02-25 PROCEDURE — 82570 ASSAY OF URINE CREATININE: CPT

## 2025-02-25 PROCEDURE — 84443 ASSAY THYROID STIM HORMONE: CPT

## 2025-02-25 PROCEDURE — 83036 HEMOGLOBIN GLYCOSYLATED A1C: CPT

## 2025-02-25 PROCEDURE — 36415 COLL VENOUS BLD VENIPUNCTURE: CPT

## 2025-02-25 PROCEDURE — 87389 HIV-1 AG W/HIV-1&-2 AB AG IA: CPT

## 2025-02-25 PROCEDURE — 80061 LIPID PANEL: CPT

## 2025-02-26 ENCOUNTER — RESULTS FOLLOW-UP (OUTPATIENT)
Dept: MEDICAL GROUP | Facility: PHYSICIAN GROUP | Age: 60
End: 2025-02-26

## 2025-02-26 LAB
T4 FREE SERPL-MCNC: 1.27 NG/DL (ref 0.93–1.7)
TSH SERPL DL<=0.005 MIU/L-ACNC: 7.67 UIU/ML (ref 0.38–5.33)

## 2025-03-02 DIAGNOSIS — E03.9 ACQUIRED HYPOTHYROIDISM: ICD-10-CM

## 2025-03-02 RX ORDER — LEVOTHYROXINE SODIUM 100 UG/1
100 TABLET ORAL
Qty: 90 TABLET | Refills: 3 | Status: SHIPPED | OUTPATIENT
Start: 2025-03-02

## 2025-03-08 ENCOUNTER — OFFICE VISIT (OUTPATIENT)
Dept: URGENT CARE | Facility: PHYSICIAN GROUP | Age: 60
End: 2025-03-08
Payer: COMMERCIAL

## 2025-03-08 ENCOUNTER — HOSPITAL ENCOUNTER (OUTPATIENT)
Facility: MEDICAL CENTER | Age: 60
End: 2025-03-08
Attending: PHYSICIAN ASSISTANT
Payer: COMMERCIAL

## 2025-03-08 VITALS
HEIGHT: 65 IN | SYSTOLIC BLOOD PRESSURE: 120 MMHG | RESPIRATION RATE: 20 BRPM | OXYGEN SATURATION: 98 % | TEMPERATURE: 96.3 F | DIASTOLIC BLOOD PRESSURE: 76 MMHG | WEIGHT: 216.2 LBS | HEART RATE: 86 BPM | BODY MASS INDEX: 36.02 KG/M2

## 2025-03-08 DIAGNOSIS — K21.9 GASTROESOPHAGEAL REFLUX DISEASE WITHOUT ESOPHAGITIS: ICD-10-CM

## 2025-03-08 DIAGNOSIS — E78.5 DYSLIPIDEMIA: ICD-10-CM

## 2025-03-08 DIAGNOSIS — N30.00 ACUTE CYSTITIS WITHOUT HEMATURIA: ICD-10-CM

## 2025-03-08 DIAGNOSIS — I10 ESSENTIAL HYPERTENSION: ICD-10-CM

## 2025-03-08 LAB
APPEARANCE UR: NORMAL
BILIRUB UR STRIP-MCNC: NEGATIVE MG/DL
COLOR UR AUTO: YELLOW
GLUCOSE UR STRIP.AUTO-MCNC: 500 MG/DL
KETONES UR STRIP.AUTO-MCNC: NEGATIVE MG/DL
LEUKOCYTE ESTERASE UR QL STRIP.AUTO: NEGATIVE
NITRITE UR QL STRIP.AUTO: NEGATIVE
PH UR STRIP.AUTO: 5 [PH] (ref 5–8)
PROT UR QL STRIP: NEGATIVE MG/DL
RBC UR QL AUTO: NEGATIVE
SP GR UR STRIP.AUTO: 1.02
UROBILINOGEN UR STRIP-MCNC: 0.2 MG/DL

## 2025-03-08 PROCEDURE — 87186 SC STD MICRODIL/AGAR DIL: CPT

## 2025-03-08 PROCEDURE — 1126F AMNT PAIN NOTED NONE PRSNT: CPT | Performed by: PHYSICIAN ASSISTANT

## 2025-03-08 PROCEDURE — 87077 CULTURE AEROBIC IDENTIFY: CPT

## 2025-03-08 PROCEDURE — 87086 URINE CULTURE/COLONY COUNT: CPT

## 2025-03-08 PROCEDURE — 3074F SYST BP LT 130 MM HG: CPT | Performed by: PHYSICIAN ASSISTANT

## 2025-03-08 PROCEDURE — 99214 OFFICE O/P EST MOD 30 MIN: CPT | Performed by: PHYSICIAN ASSISTANT

## 2025-03-08 PROCEDURE — 3078F DIAST BP <80 MM HG: CPT | Performed by: PHYSICIAN ASSISTANT

## 2025-03-08 PROCEDURE — 81002 URINALYSIS NONAUTO W/O SCOPE: CPT | Performed by: PHYSICIAN ASSISTANT

## 2025-03-08 RX ORDER — CEFDINIR 300 MG/1
300 CAPSULE ORAL EVERY 12 HOURS
Qty: 14 CAPSULE | Refills: 0 | Status: SHIPPED | OUTPATIENT
Start: 2025-03-08 | End: 2025-03-15

## 2025-03-08 RX ORDER — OMEPRAZOLE 40 MG/1
40 CAPSULE, DELAYED RELEASE ORAL DAILY
Qty: 30 CAPSULE | Refills: 3 | Status: SHIPPED | OUTPATIENT
Start: 2025-03-08

## 2025-03-08 RX ORDER — ATORVASTATIN CALCIUM 40 MG/1
40 TABLET, FILM COATED ORAL
Qty: 90 TABLET | Refills: 3 | Status: SHIPPED | OUTPATIENT
Start: 2025-03-08

## 2025-03-08 RX ORDER — RAMIPRIL 2.5 MG/1
2.5 CAPSULE ORAL DAILY
Qty: 90 CAPSULE | Refills: 3 | Status: SHIPPED | OUTPATIENT
Start: 2025-03-08

## 2025-03-08 ASSESSMENT — ENCOUNTER SYMPTOMS
SWEATS: 0
NAUSEA: 0
CHILLS: 0
VOMITING: 0
FLANK PAIN: 0

## 2025-03-08 ASSESSMENT — FIBROSIS 4 INDEX: FIB4 SCORE: 1.7

## 2025-03-08 ASSESSMENT — PAIN SCALES - GENERAL: PAINLEVEL_OUTOF10: NO PAIN

## 2025-03-08 NOTE — ASSESSMENT & PLAN NOTE
Orders:    omeprazole (PRILOSEC) 40 MG delayed-release capsule; Take 1 Capsule by mouth every day.

## 2025-03-08 NOTE — PROGRESS NOTES
Subjective     Stefania Serrano is a very pleasant 59 y.o. female who presents with UTI (Per patient: 4-months off/on, pain, burning sensation and urgency with urination. States previous Rx for UTI isn't completely treating UTI.) and Medication Refill (Atorvastatin, Ramipril, Omeprazole)            Dysuria   This is a new problem. The current episode started in the past 7 days. The problem occurs every urination. The problem has been unchanged. There has been no fever. The fever has been present for Less than 1 day. There is No history of pyelonephritis. Associated symptoms include frequency and urgency. Pertinent negatives include no chills, flank pain, hematuria, hesitancy, nausea, sweats or vomiting. The treatment provided no relief. Her past medical history is significant for recurrent UTIs.       PMH:  has a past medical history of Acquired hypothyroidism, Arthritis, Asthma, Back pain, Breath shortness, Cancer ([facility]) (2019), Chickenpox, COPD (chronic obstructive pulmonary disease) ([facility]), Crohns disease, Daytime sleepiness, Essential hypertension, Fatigue, GERD (gastroesophageal reflux disease), High cholesterol, Hypothyroidism, IBD (inflammatory bowel disease) (06/14/2021), Influenza, Insomnia, Lung cancer ([facility]), MALT lymphoma, Obesity, Pulmonary emphysema ([facility]), Pulmonary nodules, Restless leg syndrome, Shortness of breath, Type 2 diabetes mellitus without complication, with long-term current use of insulin ([facility]), Wears glasses, and Wheezing.    She has no past medical history of Cough, Painful breathing, or Sputum production.  MEDS:   Current Outpatient Medications:     levothyroxine (SYNTHROID) 100 MCG Tab, Take 1 Tablet by mouth every morning on an empty stomach., Disp: 90 Tablet, Rfl: 3    MOUNJARO 12.5 MG/0.5ML Solution Pen-injector, ADMINISTER 12.5 MG UNDER THE SKIN WEEKLY, Disp: , Rfl:     omeprazole (PRILOSEC) 40 MG delayed-release capsule, Take 40 mg by mouth every day., Disp: , Rfl:     atorvastatin  "(LIPITOR) 40 MG Tab, Take 1 Tablet by mouth at bedtime., Disp: 90 Tablet, Rfl: 3    ramipril (ALTACE) 2.5 MG Cap, Take 1 Capsule by mouth every day., Disp: 90 Capsule, Rfl: 3    therapeutic multivitamin-minerals (THERAGRAN-M) Tab, Take 1 Tablet by mouth every day., Disp: , Rfl:     glucose blood strip, USE AS DIRECTED. 4 TIMES DAILY (Patient not taking: Reported on 3/8/2025), Disp: , Rfl:     ONETOUCH VERIO strip, , Disp: , Rfl:   ALLERGIES:   Allergies   Allergen Reactions    Paclitaxel Anaphylaxis     Tongue swelling, difficulty breathing, felt like she could have     Seasonal      Trees, pollen     SURGHX:   Past Surgical History:   Procedure Laterality Date    PB PALMAR FASCIECTOMY Left 3/3/2022    Procedure: LEFT INDEX FINGER FASCIECTOMY;  Surgeon: Brien Villagomez M.D.;  Location: SURGERY SAME DAY [facility];  Service: Orthopedics    GASTRIC BYPASS LAPAROSCOPIC  2016    sleeve    ABDOMINAL HYSTERECTOMY TOTAL      NADER + SBO    BRONCHOSCOPY      CARPAL TUNNEL RELEASE      CHOLECYSTECTOMY      LUNG BIOPSY OPEN      SLEEVE,JESSICA VASO THIGH       SOCHX:  reports that she has never smoked. She has never used smokeless tobacco. She reports current alcohol use. She reports that she does not use drugs.  FH: family history includes Breast Cancer in her mother; Heart Disease in her mother; Heart Failure in her father; No Known Problems in an other family member.      Review of Systems   Constitutional:  Negative for chills.   Gastrointestinal:  Negative for nausea and vomiting.   Genitourinary:  Positive for dysuria, frequency and urgency. Negative for flank pain, hematuria and hesitancy.              Objective     /76 (BP Location: Left arm, Patient Position: Sitting, BP Cuff Size: Large adult)   Pulse 86   Temp (!) 35.7 °C (96.3 °F) (Temporal)   Resp 20   Ht 1.651 m (5' 5\")   Wt 98.1 kg (216 lb 3.2 oz)   SpO2 98%   BMI 35.98 kg/m²      Physical Exam  Vitals and nursing note reviewed.   Constitutional:      "  General: She is not in acute distress.     Appearance: Normal appearance. She is not ill-appearing, toxic-appearing or diaphoretic.   HENT:      Head: Normocephalic and atraumatic.      Right Ear: External ear normal.      Left Ear: External ear normal.      Nose: Nose normal.   Eyes:      Conjunctiva/sclera: Conjunctivae normal.   Cardiovascular:      Rate and Rhythm: Normal rate and regular rhythm.      Heart sounds: Normal heart sounds.   Pulmonary:      Effort: Pulmonary effort is normal. No respiratory distress.      Breath sounds: Normal breath sounds. No wheezing, rhonchi or rales.   Abdominal:      General: Abdomen is flat. There is no distension.      Palpations: Abdomen is soft.      Tenderness: There is no abdominal tenderness. There is no right CVA tenderness, left CVA tenderness, guarding or rebound.      Comments: No suprapubic tenderness   Musculoskeletal:      Cervical back: Normal range of motion and neck supple.   Skin:     General: Skin is warm and dry.   Neurological:      General: No focal deficit present.      Mental Status: She is alert and oriented to person, place, and time. Mental status is at baseline.   Psychiatric:         Mood and Affect: Mood normal.         Behavior: Behavior normal.         Thought Content: Thought content normal.         Judgment: Judgment normal.                                  Assessment & Plan  Acute cystitis without hematuria  Urinalysis: Glucose otherwise negative    UTI symptoms for several weeks and patient states it feels like a UTI.  She notes that she has had negative UA which does reflex to a positive culture in the past and is concerned about the same.  Certainly no red flag symptoms, vitals reassuring and exam reassuring.  She does elect to start antibiotics but we await urine culture.    Take full course of antibiotic  Urine culture initiated, I will only reach patient if I need to change antibiotic pending results  Significantly increase fluids  OTC  meds including Motrin or Azo if tolerated  Signs and symptoms of worsening infection discussed at length    Orders:    POCT Urinalysis    Urine Culture; Future    cefdinir (OMNICEF) 300 MG Cap; Take 1 Capsule by mouth every 12 hours for 7 days.    Dyslipidemia    Orders:    atorvastatin (LIPITOR) 40 MG Tab; Take 1 Tablet by mouth at bedtime.    Gastroesophageal reflux disease without esophagitis    Orders:    omeprazole (PRILOSEC) 40 MG delayed-release capsule; Take 1 Capsule by mouth every day.    Essential hypertension    Orders:    ramipril (ALTACE) 2.5 MG Cap; Take 1 Capsule by mouth every day.           I personally reviewed prior external notes and test results pertinent to today's visit. Return to clinic or go to ED if symptoms worsen or persist. Red flag symptoms and indications for ED discussed at length. Patient/Parent/Guardian voices understanding.  AVS with post-visit instructions printed and provided or given verbally.  Follow-up with your primary care provider in 3-5 days. All side effects and potential interactions of prescribed medication discussed including allergic response, GI upset, tendon injury, rash, sedation, OCP effectiveness, etc.    Please note that this dictation was created using voice recognition software. I have made every reasonable attempt to correct obvious errors, but I expect that there are errors of grammar and possibly content that I did not discover before finalizing the note.

## 2025-03-10 DIAGNOSIS — N30.00 ACUTE CYSTITIS WITHOUT HEMATURIA: ICD-10-CM

## 2025-03-13 ENCOUNTER — RESULTS FOLLOW-UP (OUTPATIENT)
Dept: URGENT CARE | Facility: CLINIC | Age: 60
End: 2025-03-13
Payer: COMMERCIAL

## 2025-03-15 ENCOUNTER — HOSPITAL ENCOUNTER (OUTPATIENT)
Dept: LAB | Facility: MEDICAL CENTER | Age: 60
End: 2025-03-15
Payer: COMMERCIAL

## 2025-03-15 LAB
ALBUMIN SERPL BCP-MCNC: 3.9 G/DL (ref 3.2–4.9)
ALBUMIN/GLOB SERPL: 1.1 G/DL
ALP SERPL-CCNC: 70 U/L (ref 30–99)
ALT SERPL-CCNC: 41 U/L (ref 2–50)
ANION GAP SERPL CALC-SCNC: 9 MMOL/L (ref 7–16)
AST SERPL-CCNC: 37 U/L (ref 12–45)
BASOPHILS # BLD AUTO: 0.6 % (ref 0–1.8)
BASOPHILS # BLD: 0.03 K/UL (ref 0–0.12)
BILIRUB SERPL-MCNC: 0.6 MG/DL (ref 0.1–1.5)
BUN SERPL-MCNC: 16 MG/DL (ref 8–22)
CALCIUM ALBUM COR SERPL-MCNC: 9.2 MG/DL (ref 8.5–10.5)
CALCIUM SERPL-MCNC: 9.1 MG/DL (ref 8.5–10.5)
CHLORIDE SERPL-SCNC: 108 MMOL/L (ref 96–112)
CO2 SERPL-SCNC: 23 MMOL/L (ref 20–33)
CREAT SERPL-MCNC: 0.76 MG/DL (ref 0.5–1.4)
EOSINOPHIL # BLD AUTO: 0.06 K/UL (ref 0–0.51)
EOSINOPHIL NFR BLD: 1.2 % (ref 0–6.9)
ERYTHROCYTE [DISTWIDTH] IN BLOOD BY AUTOMATED COUNT: 41.2 FL (ref 35.9–50)
GFR SERPLBLD CREATININE-BSD FMLA CKD-EPI: 89 ML/MIN/1.73 M 2
GLOBULIN SER CALC-MCNC: 3.5 G/DL (ref 1.9–3.5)
GLUCOSE SERPL-MCNC: 152 MG/DL (ref 65–99)
HCT VFR BLD AUTO: 45.5 % (ref 37–47)
HGB BLD-MCNC: 14.6 G/DL (ref 12–16)
IMM GRANULOCYTES # BLD AUTO: 0.01 K/UL (ref 0–0.11)
IMM GRANULOCYTES NFR BLD AUTO: 0.2 % (ref 0–0.9)
LYMPHOCYTES # BLD AUTO: 1.48 K/UL (ref 1–4.8)
LYMPHOCYTES NFR BLD: 29.7 % (ref 22–41)
MCH RBC QN AUTO: 29 PG (ref 27–33)
MCHC RBC AUTO-ENTMCNC: 32.1 G/DL (ref 32.2–35.5)
MCV RBC AUTO: 90.5 FL (ref 81.4–97.8)
MONOCYTES # BLD AUTO: 0.59 K/UL (ref 0–0.85)
MONOCYTES NFR BLD AUTO: 11.8 % (ref 0–13.4)
NEUTROPHILS # BLD AUTO: 2.81 K/UL (ref 1.82–7.42)
NEUTROPHILS NFR BLD: 56.5 % (ref 44–72)
NRBC # BLD AUTO: 0 K/UL
NRBC BLD-RTO: 0 /100 WBC (ref 0–0.2)
PLATELET # BLD AUTO: 214 K/UL (ref 164–446)
PMV BLD AUTO: 10.3 FL (ref 9–12.9)
POTASSIUM SERPL-SCNC: 4.3 MMOL/L (ref 3.6–5.5)
PROT SERPL-MCNC: 7.4 G/DL (ref 6–8.2)
RBC # BLD AUTO: 5.03 M/UL (ref 4.2–5.4)
SODIUM SERPL-SCNC: 140 MMOL/L (ref 135–145)
WBC # BLD AUTO: 5 K/UL (ref 4.8–10.8)

## 2025-03-15 PROCEDURE — 36415 COLL VENOUS BLD VENIPUNCTURE: CPT

## 2025-03-15 PROCEDURE — 85025 COMPLETE CBC W/AUTO DIFF WBC: CPT

## 2025-03-15 PROCEDURE — 80053 COMPREHEN METABOLIC PANEL: CPT

## 2025-03-18 ENCOUNTER — APPOINTMENT (OUTPATIENT)
Dept: MEDICAL GROUP | Facility: PHYSICIAN GROUP | Age: 60
End: 2025-03-18
Payer: COMMERCIAL

## 2025-03-18 VITALS
OXYGEN SATURATION: 96 % | BODY MASS INDEX: 36.42 KG/M2 | SYSTOLIC BLOOD PRESSURE: 112 MMHG | HEART RATE: 81 BPM | TEMPERATURE: 97.8 F | HEIGHT: 65 IN | WEIGHT: 218.6 LBS | DIASTOLIC BLOOD PRESSURE: 64 MMHG

## 2025-03-18 DIAGNOSIS — Z79.4 TYPE 2 DIABETES MELLITUS WITHOUT COMPLICATION, WITH LONG-TERM CURRENT USE OF INSULIN (HCC): ICD-10-CM

## 2025-03-18 DIAGNOSIS — E11.9 TYPE 2 DIABETES MELLITUS WITHOUT COMPLICATION, WITH LONG-TERM CURRENT USE OF INSULIN (HCC): ICD-10-CM

## 2025-03-18 DIAGNOSIS — E03.9 ACQUIRED HYPOTHYROIDISM: ICD-10-CM

## 2025-03-18 DIAGNOSIS — E78.5 DYSLIPIDEMIA: ICD-10-CM

## 2025-03-18 PROCEDURE — 3078F DIAST BP <80 MM HG: CPT | Performed by: FAMILY MEDICINE

## 2025-03-18 PROCEDURE — 3074F SYST BP LT 130 MM HG: CPT | Performed by: FAMILY MEDICINE

## 2025-03-18 PROCEDURE — 99214 OFFICE O/P EST MOD 30 MIN: CPT | Performed by: FAMILY MEDICINE

## 2025-03-18 RX ORDER — CEFDINIR 300 MG/1
300 CAPSULE ORAL 2 TIMES DAILY
COMMUNITY

## 2025-03-18 ASSESSMENT — FIBROSIS 4 INDEX: FIB4 SCORE: 1.62

## 2025-03-18 NOTE — PROGRESS NOTES
Verbal consent was acquired by the patient to use [employer] ambient listening note generation during this visit.    Subjective:     HPI:   History of Present Illness  The patient presents for the evaluation of diabetes mellitus, hypothyroidism, and a urinary tract infection (UTI).    She has been managing her blood glucose levels with Mounjaro and Xigduo, which have resulted in a reduction in her blood glucose levels. However, she reports that her glycemic control is not as stable as previously, with occasional postprandial hyperglycemia. She has experienced two instances of medication non-adherence due to an inability to refill her prescriptions, resulting in discontinuation periods of 2 weeks and 3.5 weeks over the past 6 months. Her current average blood glucose level is 160 mg/dL, with fasting levels below 150 mg/dL, although they were previously in the range of 120 to 130 mg/dL. She has an upcoming appointment with her endocrinologist scheduled for either April 2025 or May 2025.    The patient reports experiencing fatigue, which she attributes to her cancer diagnosis. She underwent a PET scan in August 2024 and has been advised that a repeat scan is not necessary unless her symptoms exacerbate. She has a scheduled telephonic consultation with her oncologist today to discuss her recent laboratory results.    During her last visit, she had a urinary tract infection that was not detected in the initial test but was later confirmed at an urgent care facility. She is currently being treated with cefdinir for this infection, which she reports as effective. However, she expresses a desire to switch to a different antibiotic due to previous inefficacy of cefdinir in treating her prior infection.    MEDICATIONS  Current: Mounjaro, Xigduo, cefdinir    Health Maintenance: Completed    Objective:     Exam:  /64 (BP Location: Right arm, Patient Position: Sitting, BP Cuff Size: Adult)   Pulse 81   Temp 36.6 °C  "(97.8 °F) (Temporal)   Ht 1.651 m (5' 5\")   Wt 99.2 kg (218 lb 9.6 oz)   SpO2 96%   BMI 36.38 kg/m²  Body mass index is 36.38 kg/m².    Physical Exam  Vitals reviewed.   Constitutional:       Appearance: Normal appearance.   HENT:      Head: Normocephalic and atraumatic.      Right Ear: External ear normal.      Left Ear: External ear normal.      Nose: Nose normal.   Cardiovascular:      Rate and Rhythm: Normal rate and regular rhythm.      Pulses: Normal pulses.      Heart sounds: Normal heart sounds. No murmur heard.  Pulmonary:      Effort: Pulmonary effort is normal. No respiratory distress.      Breath sounds: Normal breath sounds. No wheezing.   Abdominal:      General: Abdomen is flat.      Palpations: Abdomen is soft.   Skin:     General: Skin is warm and dry.   Neurological:      Mental Status: She is alert and oriented to person, place, and time.   Psychiatric:         Mood and Affect: Mood normal.         Behavior: Behavior normal.             Results  Laboratory Studies  Thyroid level was slightly off. Triglycerides were a little high. Blood sugar was a little high. Average blood sugar is 160. Fasting blood sugar is below 150. A1c was as high as 10.8 in the past and it went down to 8.7. TSH was a little on the high side, but active hormone was still normal. Kidney function, electrolytes, and liver function were all normal. MCHC was mildly low. Lymphocytes, which were a little low last time, have come up. White blood cells look good.    Assessment & Plan:     1. Acquired hypothyroidism  TSH WITH REFLEX TO FT4      2. DM-2, Type 2 diabetes mellitus without complication, with long-term current use of insulin ([facility])        3. Dyslipidemia            Assessment & Plan  1. Diabetes Mellitus.  Her blood glucose levels have shown improvement with the use of Mounjaro, but they remain elevated. The addition of Xigduo has been beneficial, though her fasting sugars are still above the desired range. Her A1c has " previously been as high as 10.8 and has decreased to 8.7, but the goal is to achieve an A1c level below 7. All diabetic screenings are current. A repeat A1c test will be conducted in approximately 2 months. She is advised to continue her current medication regimen and monitor her blood glucose levels closely.    2. Hypothyroidism.  Her TSH levels are slightly elevated, but her active hormone levels remain within the normal range. She reports fatigue, which could be attributed to her cancer diagnosis. A repeat thyroid study will be conducted in 3 weeks to monitor her TSH levels. She is advised to continue her current thyroid medication regimen.    3. Urinary Tract Infection.  She had a urinary tract infection that was not initially detected but later confirmed. She is currently on cefdinir, which appears to be effective. The presence of glucose in her urine increases her risk for infections. Once her glucose levels are under control, the frequency of recurrent infections should decrease. She is advised to complete her current course of antibiotics and report any persistent symptoms.    4. Endometrial adenocarcinoma   She had a PET scan in 08/2024 and has been advised that a repeat scan is not necessary unless her symptoms worsen. She has a scheduled phone appointment with her oncologist today to discuss her recent lab results.          Return in about 3 months (around 6/18/2025) for F/u DM.    Please note that this dictation was created using voice recognition software. I have made every reasonable attempt to correct obvious errors, but I expect that there are errors of grammar and possibly content that I did not discover before finalizing the note.    Dai Roberto MD  Family Medicine and Non - Operative Sports Medicine   [facility]

## 2025-03-21 DIAGNOSIS — E11.65 UNCONTROLLED TYPE 2 DIABETES MELLITUS WITH HYPERGLYCEMIA (HCC): ICD-10-CM

## 2025-05-06 ENCOUNTER — OFFICE VISIT (OUTPATIENT)
Dept: MEDICAL GROUP | Facility: PHYSICIAN GROUP | Age: 60
End: 2025-05-06
Payer: COMMERCIAL

## 2025-05-06 VITALS — RESPIRATION RATE: 15 BRPM | BODY MASS INDEX: 36.38 KG/M2 | OXYGEN SATURATION: 96 % | HEART RATE: 82 BPM | HEIGHT: 65 IN

## 2025-05-06 DIAGNOSIS — E11.9 TYPE 2 DIABETES MELLITUS WITHOUT COMPLICATION, WITH LONG-TERM CURRENT USE OF INSULIN (HCC): ICD-10-CM

## 2025-05-06 DIAGNOSIS — Z79.4 TYPE 2 DIABETES MELLITUS WITHOUT COMPLICATION, WITH LONG-TERM CURRENT USE OF INSULIN (HCC): ICD-10-CM

## 2025-05-06 PROCEDURE — 99401 PREV MED CNSL INDIV APPRX 15: CPT | Performed by: NURSE PRACTITIONER

## 2025-05-06 NOTE — PROGRESS NOTES
Patient Consult Note - Initial Visit    TIME IN: 3:56pm  TIME OUT: 4:22pm    Primary care physician: Dai Roberto M.D.    Reason for consult: Management of Uncontrolled Type 2 Diabetes    HPI:  Jaimee Serrano is a 60 y.o. old patient who comes in today for evaluation of above stated problem.    Most Recent HbA1c:   Lab Results   Component Value Date/Time    HBA1C 9.8 (H) 02/25/2025 06:15 AM      Lab Results   Component Value Date/Time    CREATININE 0.76 03/15/2025 08:40 AM      Reliable and Exact Care Diabetes Score    Displays the Diabetes REC Score.  A patient gets 1 point for each measure for a maximum of 7 points   1  Measures Not Met      Factor Value   REC DM SCORE - Most recent HgbA1c is below 8 Not Met   - Hemoglobin A1c 9.8 (2/25/2025)                 Diabetes Medication History and Current Regimen  GLP-1 Agent: Mounjaro 12.5mg SQ once weekly  Xigduo XR 5-1000mg BID    Previously attempted medications  Basal insulin - stopped by endo    Pt has home glucometer and proper testing technique - No    Pt reports blood sugars:   Before Breakfast:   Before Lunch:   Before Dinner:   Before Bedtime:   Other times:     Hypoglycemia awareness - Yes  Nocturnal hypoglycemia- None  Hypoglycemia:  None    Pt's treatment of Hypoglycemia - 15:15 Rule    Lifestyle:  Current Exercise - None, has some significant pulmonary issues that prevent exercise/exertion.  Exercise Goal - Suggested discussing pulmonary rehab classes through [facility] as means to build up lung function and get a consistent exercise plan in place.  She is currently working full time and unable to attend thrice weekly classes.  She is hopeful in the near future she may retire, then be able to do classes.    Nutrition -   Has good understanding of appropriate nutrition habits.  Admits that working from home and stress involved with work has resulted in grazing and snacking throughout the day.  Chips, crackers, other treats not necessarily appropriate for  diabetics in place.  She understands need to change these habits.  Low protein intake in the morning, which is concern with consistent GLP1a/GIP use.    Preventative Management  BP regimen (ACEi/ARB): Ramipril 2.5mg QD  BP <140/90: Yes  Statin: atorvastatin (Lipitor) 40 mg daily  LDL <100: Yes  Lab Results   Component Value Date/Time    CHOLSTRLTOT 189 02/25/2025 06:15 AM    LDL 93 02/25/2025 06:15 AM    HDL 59 02/25/2025 06:15 AM    TRIGLYCERIDE 183 (H) 02/25/2025 06:15 AM       Last Microalbumin/Cr:  Lab Results   Component Value Date/Time    MALBCRT 14 02/25/2025 06:15 AM    MICROALBUR 2.9 02/25/2025 06:15 AM     Last A1c:  Lab Results   Component Value Date/Time    HBA1C 9.8 (H) 02/25/2025 06:15 AM          Monofilament exam: up to date, last done 8/2024       REC DM Score: 1  Care gaps addressed:   A1c is above 8%: Optimized DM medications/management  Care gaps updated in Health Maintenance      Past Medical History:  Patient Active Problem List    Diagnosis Date Noted    Obesity (BMI 30-39.9) 08/15/2024    Crohn's disease of small and large intestines with complication (HCC) 08/15/2024    Abnormal CT of the chest 08/13/2023    Coronary artery calcification seen on CT scan 04/10/2023    Dupuytren's contracture of left hand 01/19/2022    Ganglion cyst of finger of left hand 12/09/2021    Abnormal laboratory test 10/13/2021    Encounter for completion of form with patient 09/08/2021    Mediastinal lymphadenopathy 09/03/2021    Hilar lymphadenopathy 09/03/2021    Cancer with pulmonary metastases (HCC) (possible)  09/03/2021    Palpitations 09/03/2021    Other fatigue 09/03/2021    Orthopnea 09/03/2021    Dyspnea on exertion 09/03/2021    Chronic bilateral low back pain without sciatica 06/24/2021    IBD (inflammatory bowel disease) 06/14/2021    MALT lymphoma (HCC) 06/14/2021    Endometrioid adenocarcinoma of uterus (HCC) 06/14/2021    DM-2, Type 2 diabetes mellitus without complication, with long-term current use  of insulin (HCC) 06/14/2021    Acquired hypothyroidism 06/14/2021    Gastroesophageal reflux disease without esophagitis 06/14/2021    Essential hypertension 06/14/2021    Dyslipidemia 06/14/2021    Pulmonary nodules 06/14/2021    Endometrial carcinoma (HCC) 01/20/2020    Malignant lymphoma (HCC) 01/20/2020    Abnormal liver function tests 03/18/2019    Hyperlipidemia 03/18/2019    Obesity 03/18/2019    Uncontrolled type 2 diabetes mellitus with hyperglycemia (HCC) 03/18/2019       Past Surgical History:  Past Surgical History:   Procedure Laterality Date    PB PALMAR FASCIECTOMY Left 3/3/2022    Procedure: LEFT INDEX FINGER FASCIECTOMY;  Surgeon: Brien Villagomez M.D.;  Location: SURGERY SAME DAY [facility];  Service: Orthopedics    GASTRIC BYPASS LAPAROSCOPIC  2016    sleeve    ABDOMINAL HYSTERECTOMY TOTAL      NADER + SBO    BRONCHOSCOPY      CARPAL TUNNEL RELEASE      CHOLECYSTECTOMY      LUNG BIOPSY OPEN      SLEEVE,JESSICA VASO THIGH         Allergies:  Paclitaxel and Seasonal    Social History:  Social History     Socioeconomic History    Marital status: Single     Spouse name: Not on file    Number of children: Not on file    Years of education: Not on file    Highest education level: Not on file   Occupational History    Not on file   Tobacco Use    Smoking status: Never    Smokeless tobacco: Never   Vaping Use    Vaping status: Never Used   Substance and Sexual Activity    Alcohol use: Yes     Alcohol/week: 0.0 oz     Comment: 1 to 2 drinks/year    Drug use: Never    Sexual activity: Not on file   Other Topics Concern    Not on file   Social History Narrative    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      Social Drivers of Health     Financial Resource Strain: Not on file   Food Insecurity: Not on file   Transportation Needs: Not on file   Physical Activity: Not on file   Stress: Not on file   Social Connections: Not on file   Intimate Partner Violence: Not on file   Housing Stability: Not  "on file       Family History:  Family History   Problem Relation Age of Onset    No Known Problems Other     Heart Disease Mother         rhuematic fever, valve replacement    Breast Cancer Mother     Heart Failure Father        Medications:    Current Outpatient Medications:     cefdinir (OMNICEF) 300 MG Cap, Take 300 mg by mouth 2 times a day., Disp: , Rfl:     atorvastatin (LIPITOR) 40 MG Tab, Take 1 Tablet by mouth at bedtime., Disp: 90 Tablet, Rfl: 3    omeprazole (PRILOSEC) 40 MG delayed-release capsule, Take 1 Capsule by mouth every day., Disp: 30 Capsule, Rfl: 3    ramipril (ALTACE) 2.5 MG Cap, Take 1 Capsule by mouth every day., Disp: 90 Capsule, Rfl: 3    levothyroxine (SYNTHROID) 100 MCG Tab, Take 1 Tablet by mouth every morning on an empty stomach., Disp: 90 Tablet, Rfl: 3    MOUNJARO 12.5 MG/0.5ML Solution Pen-injector, ADMINISTER 12.5 MG UNDER THE SKIN WEEKLY, Disp: , Rfl:     therapeutic multivitamin-minerals (THERAGRAN-M) Tab, Take 1 Tablet by mouth every day., Disp: , Rfl:     Labs: Reviewed    Physical Examination:  Vital signs: Pulse 82   Resp 15   Ht 1.651 m (5' 5\")   SpO2 96%   BMI 36.38 kg/m²  Body mass index is 36.38 kg/m².    Assessment and Plan:    1. DM2  Patient seen today for initial visit, referred by PCP.  Longstanding hx of diabetes, currently managed by [facility].  A1c has fluctuated quite a bit over the last few years, most recently well above goal at 9.8% (2/2025).  She comments that supply issues with Mounjaro have forced her to miss one to two weeks every month for the past four months.  She also just recently restarted Xigduo and has compliance issues with BID dosing, but feels she is getting better.    Basic physiology of DMII was explained to patient as well as microvascular/macrovascular complications. The importance of increasing physical activity to improve diabetes control was discussed with the patient. Patient was also educated on changing diet and " making better choices to help control blood sugar.  Discussed FBG goal of , 2hrPP <180, and A1c <7.0%.    Discussed current treatment modalities and rationale for use of metformin, insulin, SGLT2i, GLP1a, and GIPa.  Discussed current therapy.  A further optimization to Mounjaro 15mg is needed, which should alleviate supply issues with pharmacy.  Possible addition of another oral agent or back to insulin therapy.  She would like to meet with her endocrinologist prior to any med changes.    - Medication changes -   None at this time, will defer to decision making of endocrinologist at [facility] for now.  Patient to discuss with their office if our role here should continue.     - Lifestyle changes -   Nutrition:  Cut back/eliminate day time snacking with high carb/high processed foods.  Increase protein intake in early part of day, has Premier protein shakes at home.  Increase lean protein and veggies, decrease simple carb intake and portions.  Exercise:    Increase as tolerated based on pulmonary function.    Follow Up:  TBD following next appt with endocrinologist.    Joe Iverson, PharmDBCACP  05/06/25    CC:   KAMLESH Rasheed M.D.                                                   [facility] Pharmacotherapy Program Consent      Name    Jaimee Serrano    MRN number: [id number]    the following are guidelines for participation in the [facility] Pharmacotherapy Program.     I, ____Jaimee Quinones Maggie_____, understand and voluntarily agree to participate in the [facility] Pharmacotherapy Program and to have services provided to me by pharmacists working in collaboration with my provider.    I understand the pharmacist within the [facility] Pharmacotherapy Program may initiate, modify or discontinue my medications, order appropriate testing and appointments, perform exams, monitor treatment, and make clinical evaluations and decisions pursuant to a collaborative practice  agreement with my provider.  I understand the pharmacist within the [facility] Pharmacotherapy Program is not a physician, osteopathic physician, advanced practice registered nurse or physician assistant and may not diagnose.  I will take all my medications as instructed and not change the way I take it without first talking to my provider or a pharmacist within the [facility] Pharmacotherapy Program.  I understand that if I am late to my appointment I may not be able to be seen by a pharmacist at that time and will have to reschedule my appointment.  During appointment with pharmacist I understand that pharmacist has the right not to answer questions or perform services outside the pharmacist’s scope of practice.  By signing below, I provide informed consent for the pharmacist to provide these services and for my participation in the [facility] Pharmacotherapy Program.      Jaimee Serrano           [id number]          05/06/25  Patient Name                   MRN number  Date     ___X_Obtained verbal consent from pt, No signature due to COVID concerns___   Patient Signature

## (undated) DEVICE — KIT ANESTHESIA W/CIRCUIT & 3/LT BAG W/FILTER (20EA/CA)

## (undated) DEVICE — PACK UPPER EXTREMITY (2EA/CA)

## (undated) DEVICE — MASK ANESTHESIA ADULT  - (100/CA)

## (undated) DEVICE — SET LEADWIRE 5 LEAD BEDSIDE DISPOSABLE ECG (1SET OF 5/EA)

## (undated) DEVICE — SET EXTENSION WITH 2 PORTS (48EA/CA) ***PART #2C8610 IS A SUBSTITUTE*****

## (undated) DEVICE — SUTURE GENERAL

## (undated) DEVICE — GLOVE BIOGEL PI ORTHO SZ 6 SURGICAL PF LF (40PR/BX)

## (undated) DEVICE — SODIUM CHL IRRIGATION 0.9% 1000ML (12EA/CA)

## (undated) DEVICE — SUTURE 4-0 VICRYL PLUS RB-1 - 27 INCH (36/BX)

## (undated) DEVICE — LACTATED RINGERS INJ 1000 ML - (14EA/CA 60CA/PF)

## (undated) DEVICE — SODIUM CHL. IRRIGATION 0.9% 3000ML (4EA/CA 65CA/PF)

## (undated) DEVICE — PADDING CAST 4 IN STERILE - 4 X 4 YDS (24/CA)

## (undated) DEVICE — SENSOR SPO2 NEO LNCS ADHESIVE (20/BX) SEE USER NOTES

## (undated) DEVICE — WATER IRRIGATION STERILE 1000ML (12EA/CA)

## (undated) DEVICE — SUTURE 4-0 ETHILON FS-2 18 (36PK/BX)"

## (undated) DEVICE — GLOVE BIOGEL SZ 8 SURGICAL PF LTX - (50PR/BX 4BX/CA)

## (undated) DEVICE — HEAD HOLDER JUNIOR/ADULT

## (undated) DEVICE — PROTECTOR ULNA NERVE - (36PR/CA)

## (undated) DEVICE — TUBING CLEARLINK DUO-VENT - C-FLO (48EA/CA)

## (undated) DEVICE — ELECTRODE 850 FOAM ADHESIVE - HYDROGEL RADIOTRNSPRNT (50/PK)

## (undated) DEVICE — TOWEL STOP TIMEOUT SAFETY FLAG (40EA/CA)

## (undated) DEVICE — BANDAGE ELASTIC 4 HONEYCOMB - 4"X5YD LF (20/CA)"

## (undated) DEVICE — SLEEVE, VASO, THIGH, MED

## (undated) DEVICE — TOURNIQUET, STERILE 18 (RED)

## (undated) DEVICE — SUCTION INSTRUMENT YANKAUER BULBOUS TIP W/O VENT (50EA/CA)

## (undated) DEVICE — CANISTER SUCTION 3000ML MECHANICAL FILTER AUTO SHUTOFF MEDI-VAC NONSTERILE LF DISP  (40EA/CA)